# Patient Record
Sex: FEMALE | Race: WHITE | NOT HISPANIC OR LATINO | Employment: UNEMPLOYED | URBAN - METROPOLITAN AREA
[De-identification: names, ages, dates, MRNs, and addresses within clinical notes are randomized per-mention and may not be internally consistent; named-entity substitution may affect disease eponyms.]

---

## 2019-08-19 ENCOUNTER — CLINICAL SUPPORT (OUTPATIENT)
Dept: URGENT CARE | Facility: CLINIC | Age: 34
End: 2019-08-19
Payer: MEDICAID

## 2019-08-19 VITALS
HEART RATE: 94 BPM | WEIGHT: 193 LBS | TEMPERATURE: 98 F | BODY MASS INDEX: 29.25 KG/M2 | DIASTOLIC BLOOD PRESSURE: 76 MMHG | HEIGHT: 68 IN | OXYGEN SATURATION: 97 % | RESPIRATION RATE: 16 BRPM | SYSTOLIC BLOOD PRESSURE: 134 MMHG

## 2019-08-19 DIAGNOSIS — N39.0 URINARY TRACT INFECTION WITHOUT HEMATURIA, SITE UNSPECIFIED: Primary | ICD-10-CM

## 2019-08-19 LAB
B-HCG UR QL: NEGATIVE
BILIRUB UR QL STRIP: NEGATIVE
CTP QC/QA: YES
GLUCOSE UR QL STRIP: NEGATIVE
KETONES UR QL STRIP: NEGATIVE
LEUKOCYTE ESTERASE UR QL STRIP: POSITIVE
PH, POC UA: 8
POC BLOOD, URINE: NEGATIVE
POC NITRATES, URINE: NEGATIVE
PROT UR QL STRIP: NEGATIVE
SP GR UR STRIP: 1 (ref 1–1.03)
UROBILINOGEN UR STRIP-ACNC: NORMAL (ref 0.1–1.1)

## 2019-08-19 PROCEDURE — 81003 POCT URINALYSIS, DIPSTICK, AUTOMATED, W/O SCOPE: ICD-10-PCS | Mod: QW,S$GLB,, | Performed by: NURSE PRACTITIONER

## 2019-08-19 PROCEDURE — 99204 OFFICE O/P NEW MOD 45 MIN: CPT | Mod: 25,S$GLB,, | Performed by: NURSE PRACTITIONER

## 2019-08-19 PROCEDURE — 99204 PR OFFICE/OUTPT VISIT, NEW, LEVL IV, 45-59 MIN: ICD-10-PCS | Mod: 25,S$GLB,, | Performed by: NURSE PRACTITIONER

## 2019-08-19 PROCEDURE — 81025 URINE PREGNANCY TEST: CPT | Mod: S$GLB,,, | Performed by: NURSE PRACTITIONER

## 2019-08-19 PROCEDURE — 81003 URINALYSIS AUTO W/O SCOPE: CPT | Mod: QW,S$GLB,, | Performed by: NURSE PRACTITIONER

## 2019-08-19 PROCEDURE — 81025 POCT URINE PREGNANCY: ICD-10-PCS | Mod: S$GLB,,, | Performed by: NURSE PRACTITIONER

## 2019-08-19 RX ORDER — PHENAZOPYRIDINE HYDROCHLORIDE 200 MG/1
200 TABLET, FILM COATED ORAL 3 TIMES DAILY PRN
Qty: 12 TABLET | Refills: 0 | Status: SHIPPED | OUTPATIENT
Start: 2019-08-19 | End: 2019-08-23

## 2019-08-19 RX ORDER — CEPHALEXIN 500 MG/1
500 CAPSULE ORAL 4 TIMES DAILY
Qty: 28 CAPSULE | Refills: 0 | Status: SHIPPED | OUTPATIENT
Start: 2019-08-19 | End: 2019-08-26

## 2019-08-19 NOTE — PROGRESS NOTES
"Subjective:       Patient ID: Soraya Gee is a 34 y.o. female.    Vitals:  height is 5' 8" (1.727 m) and weight is 87.5 kg (193 lb). Her temperature is 98.1 °F (36.7 °C). Her blood pressure is 134/76 and her pulse is 94. Her respiration is 16 and oxygen saturation is 97%.     Chief Complaint: Dysuria    Patient complains of urinary frequency, urgency and burning that began 1 week ago. Denies any new partners, vaginal discharge, vaginal bleeding, fever, hematuria or flank pain.     Dysuria    This is a new problem. Episode onset: 5 days. The problem occurs every urination. The problem has been gradually worsening. The quality of the pain is described as aching. She is sexually active. There is no history of pyelonephritis. Associated symptoms include frequency and urgency. Pertinent negatives include no chills, nausea, vomiting or rash. Associated symptoms comments: pressure  . Treatments tried: AZOs.       Constitution: Negative for chills, fatigue and fever.   HENT: Negative for congestion and sore throat.    Neck: Negative for painful lymph nodes.   Cardiovascular: Negative for chest pain and leg swelling.   Eyes: Negative for double vision and blurred vision.   Respiratory: Negative for cough and shortness of breath.    Gastrointestinal: Negative for nausea, vomiting and diarrhea.   Genitourinary: Positive for dysuria, frequency and urgency. Negative for history of kidney stones.   Musculoskeletal: Negative for joint pain, joint swelling, muscle cramps and muscle ache.   Skin: Negative for color change, pale, rash and bruising.   Allergic/Immunologic: Negative for seasonal allergies.   Neurological: Negative for dizziness, history of vertigo, light-headedness, passing out and headaches.   Hematologic/Lymphatic: Negative for swollen lymph nodes.   Psychiatric/Behavioral: Negative for nervous/anxious, sleep disturbance and depression. The patient is not nervous/anxious.        Objective:      Physical Exam "   Constitutional: She is oriented to person, place, and time. She appears well-developed and well-nourished. She is cooperative.  Non-toxic appearance. She does not appear ill. No distress.   HENT:   Head: Normocephalic and atraumatic.   Right Ear: Hearing, tympanic membrane, external ear and ear canal normal.   Left Ear: Hearing, tympanic membrane, external ear and ear canal normal.   Nose: Nose normal. No mucosal edema, rhinorrhea or nasal deformity. No epistaxis. Right sinus exhibits no maxillary sinus tenderness and no frontal sinus tenderness. Left sinus exhibits no maxillary sinus tenderness and no frontal sinus tenderness.   Mouth/Throat: Uvula is midline, oropharynx is clear and moist and mucous membranes are normal. No trismus in the jaw. Normal dentition. No uvula swelling. No posterior oropharyngeal erythema.   Eyes: Conjunctivae and lids are normal. Right eye exhibits no discharge. Left eye exhibits no discharge. No scleral icterus.   Sclera clear bilat   Neck: Trachea normal, normal range of motion, full passive range of motion without pain and phonation normal. Neck supple.   Cardiovascular: Normal rate, regular rhythm, normal heart sounds, intact distal pulses and normal pulses.   Pulmonary/Chest: Effort normal and breath sounds normal. No respiratory distress.   Abdominal: Soft. Normal appearance and bowel sounds are normal. She exhibits no distension, no pulsatile midline mass and no mass. There is no tenderness.   Musculoskeletal: Normal range of motion. She exhibits no edema or deformity.   Neurological: She is alert and oriented to person, place, and time. She exhibits normal muscle tone. Coordination normal. GCS eye subscore is 4. GCS verbal subscore is 5. GCS motor subscore is 6.   Skin: Skin is warm, dry and intact. No rash noted. She is not diaphoretic. No pallor.   Psychiatric: She has a normal mood and affect. Her speech is normal and behavior is normal. Judgment and thought content normal.  Cognition and memory are normal.   Nursing note and vitals reviewed.      Assessment:       1. Urinary tract infection without hematuria, site unspecified        Plan:       The patient's symptoms are consistent with a urinary tract infection.  The patient does not appear to have pyelonephritis, urinary retention, ureterolithiasis, or urosepsis.  The patient will be treated with keflex pending a urine culture. Instructed patient to follow up with PCP or urology if symptoms continue. Instructions given on when to go to the ED.     Urinary tract infection without hematuria, site unspecified  -     POCT Urinalysis, Dipstick, Automated, W/O Scope  -     POCT urine pregnancy  -     Culture, Urine    Other orders  -     cephALEXin (KEFLEX) 500 MG capsule; Take 1 capsule (500 mg total) by mouth 4 (four) times daily. for 7 days  Dispense: 28 capsule; Refill: 0  -     phenazopyridine (PYRIDIUM) 200 MG tablet; Take 1 tablet (200 mg total) by mouth 3 (three) times daily as needed for Pain.  Dispense: 12 tablet; Refill: 0

## 2019-08-19 NOTE — PATIENT INSTRUCTIONS

## 2019-08-26 LAB
BACTERIA UR CULT: ABNORMAL
BACTERIA UR CULT: ABNORMAL
OTHER ANTIBIOTIC SUSC ISLT: ABNORMAL

## 2019-09-04 ENCOUNTER — TELEPHONE (OUTPATIENT)
Dept: URGENT CARE | Facility: CLINIC | Age: 34
End: 2019-09-04

## 2019-09-04 NOTE — TELEPHONE ENCOUNTER
----- Message from Ofelia Dwyer NP sent at 8/27/2019  9:18 AM CDT -----  E. Coli in urine culture- patient was given Keflex which is appropriate treatment. Please contact patient- if she is still having symptoms, will need PCP or urology follow up for additional testing.

## 2020-02-29 ENCOUNTER — HOSPITAL ENCOUNTER (INPATIENT)
Facility: HOSPITAL | Age: 35
LOS: 18 days | Discharge: HOME | DRG: 871 | End: 2020-03-18
Attending: EMERGENCY MEDICINE | Admitting: INTERNAL MEDICINE
Payer: MEDICAID

## 2020-02-29 DIAGNOSIS — R78.81 MRSA BACTEREMIA: ICD-10-CM

## 2020-02-29 DIAGNOSIS — I50.9 CONGESTIVE HEART FAILURE, UNSPECIFIED HF CHRONICITY, UNSPECIFIED HEART FAILURE TYPE: ICD-10-CM

## 2020-02-29 DIAGNOSIS — B95.62 MRSA BACTEREMIA: ICD-10-CM

## 2020-02-29 DIAGNOSIS — I07.9 ENDOCARDITIS OF TRICUSPID VALVE: ICD-10-CM

## 2020-02-29 DIAGNOSIS — I38 ENDOCARDITIS, UNSPECIFIED CHRONICITY, UNSPECIFIED ENDOCARDITIS TYPE: ICD-10-CM

## 2020-02-29 DIAGNOSIS — F11.10 HEROIN ABUSE: ICD-10-CM

## 2020-02-29 DIAGNOSIS — I38 ENDOCARDITIS: ICD-10-CM

## 2020-02-29 DIAGNOSIS — R01.1 MURMUR, HEART: ICD-10-CM

## 2020-02-29 DIAGNOSIS — I33.0 ACUTE BACTERIAL ENDOCARDITIS: Primary | ICD-10-CM

## 2020-02-29 PROBLEM — N17.9 AKI (ACUTE KIDNEY INJURY): Status: ACTIVE | Noted: 2020-02-29

## 2020-02-29 PROBLEM — F19.90 IVDU (INTRAVENOUS DRUG USER): Status: ACTIVE | Noted: 2020-02-29

## 2020-02-29 PROBLEM — I33.9 ACUTE ENDOCARDITIS: Status: ACTIVE | Noted: 2020-02-29

## 2020-02-29 PROBLEM — B19.20 HEPATITIS C: Status: ACTIVE | Noted: 2020-02-29

## 2020-02-29 LAB
ALBUMIN SERPL BCP-MCNC: 2.9 G/DL (ref 3.5–5.2)
ALBUMIN SERPL BCP-MCNC: 2.9 G/DL (ref 3.5–5.2)
ALLENS TEST: ABNORMAL
ALLENS TEST: ABNORMAL
ALP SERPL-CCNC: 164 U/L (ref 55–135)
ALP SERPL-CCNC: 164 U/L (ref 55–135)
ALT SERPL W/O P-5'-P-CCNC: 65 U/L (ref 10–44)
ALT SERPL W/O P-5'-P-CCNC: 65 U/L (ref 10–44)
AMPHET+METHAMPHET UR QL: NEGATIVE
AMYLASE SERPL-CCNC: 25 U/L (ref 20–110)
ANION GAP SERPL CALC-SCNC: 15 MMOL/L (ref 8–16)
ANION GAP SERPL CALC-SCNC: 15 MMOL/L (ref 8–16)
APTT PPP: 30.5 SEC (ref 23.6–33.3)
AST SERPL-CCNC: 72 U/L (ref 10–40)
AST SERPL-CCNC: 72 U/L (ref 10–40)
B-HCG UR QL: NEGATIVE
BACTERIA #/AREA URNS HPF: NEGATIVE /HPF
BARBITURATES UR QL SCN>200 NG/ML: NEGATIVE
BASOPHILS # BLD AUTO: 0.07 K/UL (ref 0–0.2)
BASOPHILS NFR BLD: 0.6 % (ref 0–1.9)
BENZODIAZ UR QL SCN>200 NG/ML: NORMAL
BILIRUB SERPL-MCNC: 6.2 MG/DL (ref 0.1–1)
BILIRUB SERPL-MCNC: 6.2 MG/DL (ref 0.1–1)
BILIRUB UR QL STRIP: ABNORMAL
BNP SERPL-MCNC: 87 PG/ML (ref 0–99)
BUN SERPL-MCNC: 52 MG/DL (ref 6–20)
BUN SERPL-MCNC: 52 MG/DL (ref 6–20)
BZE UR QL SCN: NEGATIVE
CALCIUM SERPL-MCNC: 8.5 MG/DL (ref 8.7–10.5)
CALCIUM SERPL-MCNC: 8.5 MG/DL (ref 8.7–10.5)
CANNABINOIDS UR QL SCN: NEGATIVE
CHLORIDE SERPL-SCNC: 91 MMOL/L (ref 95–110)
CHLORIDE SERPL-SCNC: 91 MMOL/L (ref 95–110)
CK MB SERPL-MCNC: 1.5 NG/ML (ref 0.1–6.5)
CK SERPL-CCNC: 19 U/L (ref 20–180)
CLARITY UR: ABNORMAL
CO2 SERPL-SCNC: 26 MMOL/L (ref 23–29)
CO2 SERPL-SCNC: 26 MMOL/L (ref 23–29)
COLOR UR: YELLOW
CREAT SERPL-MCNC: 1.8 MG/DL (ref 0.5–1.4)
CREAT SERPL-MCNC: 1.8 MG/DL (ref 0.5–1.4)
CREAT UR-MCNC: 71 MG/DL (ref 15–325)
CRP SERPL-MCNC: 24.33 MG/DL (ref 0–0.75)
CTP QC/QA: YES
CTP QC/QA: YES
DELSYS: ABNORMAL
DELSYS: ABNORMAL
DIFFERENTIAL METHOD: ABNORMAL
EOSINOPHIL # BLD AUTO: 0.1 K/UL (ref 0–0.5)
EOSINOPHIL NFR BLD: 1.2 % (ref 0–8)
EP: 8
ERYTHROCYTE [DISTWIDTH] IN BLOOD BY AUTOMATED COUNT: 13.8 % (ref 11.5–14.5)
ERYTHROCYTE [SEDIMENTATION RATE] IN BLOOD BY WESTERGREN METHOD: 20 MM/H
ERYTHROCYTE [SEDIMENTATION RATE] IN BLOOD BY WESTERGREN METHOD: 44 MM/HR (ref 0–20)
EST. GFR  (AFRICAN AMERICAN): 42 ML/MIN/1.73 M^2
EST. GFR  (AFRICAN AMERICAN): 42 ML/MIN/1.73 M^2
EST. GFR  (NON AFRICAN AMERICAN): 36.5 ML/MIN/1.73 M^2
EST. GFR  (NON AFRICAN AMERICAN): 36.5 ML/MIN/1.73 M^2
ETHANOL SERPL-MCNC: <5 MG/DL
FIO2: 35
FLOW: 2
GLUCOSE SERPL-MCNC: 88 MG/DL (ref 70–110)
GLUCOSE SERPL-MCNC: 88 MG/DL (ref 70–110)
GLUCOSE SERPL-MCNC: 93 MG/DL (ref 70–110)
GLUCOSE SERPL-MCNC: 99 MG/DL (ref 70–110)
GLUCOSE UR QL STRIP: NEGATIVE
HCO3 UR-SCNC: 27.1 MMOL/L (ref 24–28)
HCO3 UR-SCNC: 30.5 MMOL/L (ref 24–28)
HCT VFR BLD AUTO: 34 % (ref 37–48.5)
HCT VFR BLD CALC: 34 %PCV (ref 36–54)
HCT VFR BLD CALC: 34 %PCV (ref 36–54)
HGB BLD-MCNC: 11.9 G/DL (ref 12–16)
HGB UR QL STRIP: ABNORMAL
HYALINE CASTS #/AREA URNS LPF: 10 /LPF
IMM GRANULOCYTES # BLD AUTO: 0.17 K/UL (ref 0–0.04)
IMM GRANULOCYTES NFR BLD AUTO: 1.5 % (ref 0–0.5)
INR PPP: 1.4
IP: 16
KETONES UR QL STRIP: NEGATIVE
LACTATE SERPL-SCNC: 1.7 MMOL/L (ref 0.5–1.9)
LACTATE SERPL-SCNC: 1.9 MMOL/L (ref 0.5–1.9)
LEUKOCYTE ESTERASE UR QL STRIP: ABNORMAL
LIPASE SERPL-CCNC: 29 U/L (ref 4–60)
LYMPHOCYTES # BLD AUTO: 1 K/UL (ref 1–4.8)
LYMPHOCYTES NFR BLD: 8.4 % (ref 18–48)
MAGNESIUM SERPL-MCNC: 1.7 MG/DL (ref 1.6–2.6)
MCH RBC QN AUTO: 30.8 PG (ref 27–31)
MCHC RBC AUTO-ENTMCNC: 35 G/DL (ref 32–36)
MCV RBC AUTO: 88 FL (ref 82–98)
MICROSCOPIC COMMENT: ABNORMAL
MIN VOL: 12.6
MODE: ABNORMAL
MODE: ABNORMAL
MONOCYTES # BLD AUTO: 0.6 K/UL (ref 0.3–1)
MONOCYTES NFR BLD: 5.6 % (ref 4–15)
NEUTROPHILS # BLD AUTO: 9.4 K/UL (ref 1.8–7.7)
NEUTROPHILS NFR BLD: 82.7 % (ref 38–73)
NITRITE UR QL STRIP: NEGATIVE
NRBC BLD-RTO: 0 /100 WBC
OPIATES UR QL SCN: NORMAL
PCO2 BLDA: 30.5 MMHG (ref 35–45)
PCO2 BLDA: 42.2 MMHG (ref 35–45)
PCP UR QL SCN>25 NG/ML: NEGATIVE
PH SMN: 7.47 [PH] (ref 7.35–7.45)
PH SMN: 7.55 [PH] (ref 7.35–7.45)
PH UR STRIP: 6 [PH] (ref 5–8)
PHOSPHATE SERPL-MCNC: 3.6 MG/DL (ref 2.7–4.5)
PLATELET # BLD AUTO: 87 K/UL (ref 150–350)
PMV BLD AUTO: 11.4 FL (ref 9.2–12.9)
PO2 BLDA: 56 MMHG (ref 80–100)
PO2 BLDA: 91 MMHG (ref 80–100)
POC BE: 5 MMOL/L
POC BE: 7 MMOL/L
POC IONIZED CALCIUM: 1.08 MMOL/L (ref 1.06–1.42)
POC IONIZED CALCIUM: 1.08 MMOL/L (ref 1.06–1.42)
POC MOLECULAR INFLUENZA A AGN: NEGATIVE
POC MOLECULAR INFLUENZA B AGN: NEGATIVE
POC SATURATED O2: 93 % (ref 95–100)
POC SATURATED O2: 98 % (ref 95–100)
POC TCO2: 28 MMOL/L (ref 23–27)
POC TCO2: 32 MMOL/L (ref 23–27)
POTASSIUM BLD-SCNC: 2.4 MMOL/L (ref 3.5–5.1)
POTASSIUM BLD-SCNC: 2.6 MMOL/L (ref 3.5–5.1)
POTASSIUM SERPL-SCNC: 2.5 MMOL/L (ref 3.5–5.1)
POTASSIUM SERPL-SCNC: 2.5 MMOL/L (ref 3.5–5.1)
PROCALCITONIN SERPL IA-MCNC: 5.95 NG/ML (ref 0–0.5)
PROT SERPL-MCNC: 6.9 G/DL (ref 6–8.4)
PROT SERPL-MCNC: 6.9 G/DL (ref 6–8.4)
PROT UR QL STRIP: ABNORMAL
PROTHROMBIN TIME: 16.4 SEC (ref 10.6–14.8)
RBC # BLD AUTO: 3.86 M/UL (ref 4–5.4)
RBC #/AREA URNS HPF: 18 /HPF (ref 0–4)
SAMPLE: ABNORMAL
SAMPLE: ABNORMAL
SITE: ABNORMAL
SITE: ABNORMAL
SODIUM BLD-SCNC: 131 MMOL/L (ref 136–145)
SODIUM BLD-SCNC: 134 MMOL/L (ref 136–145)
SODIUM SERPL-SCNC: 132 MMOL/L (ref 136–145)
SODIUM SERPL-SCNC: 132 MMOL/L (ref 136–145)
SP GR UR STRIP: 1.01 (ref 1–1.03)
SP02: 100
SPONT RATE: 12
SQUAMOUS #/AREA URNS HPF: 1 /HPF
TOXICOLOGY INFORMATION: NORMAL
TROPONIN I SERPL DL<=0.01 NG/ML-MCNC: 0.07 NG/ML
TROPONIN I SERPL DL<=0.01 NG/ML-MCNC: 0.16 NG/ML
URN SPEC COLLECT METH UR: ABNORMAL
UROBILINOGEN UR STRIP-ACNC: ABNORMAL EU/DL
WBC # BLD AUTO: 11.34 K/UL (ref 3.9–12.7)
WBC #/AREA URNS HPF: 14 /HPF (ref 0–5)

## 2020-02-29 PROCEDURE — 86140 C-REACTIVE PROTEIN: CPT

## 2020-02-29 PROCEDURE — 94761 N-INVAS EAR/PLS OXIMETRY MLT: CPT

## 2020-02-29 PROCEDURE — 84295 ASSAY OF SERUM SODIUM: CPT

## 2020-02-29 PROCEDURE — 80053 COMPREHEN METABOLIC PANEL: CPT

## 2020-02-29 PROCEDURE — 85025 COMPLETE CBC W/AUTO DIFF WBC: CPT

## 2020-02-29 PROCEDURE — 83735 ASSAY OF MAGNESIUM: CPT

## 2020-02-29 PROCEDURE — 99900035 HC TECH TIME PER 15 MIN (STAT)

## 2020-02-29 PROCEDURE — 82330 ASSAY OF CALCIUM: CPT

## 2020-02-29 PROCEDURE — 36415 COLL VENOUS BLD VENIPUNCTURE: CPT

## 2020-02-29 PROCEDURE — 63600175 PHARM REV CODE 636 W HCPCS: Performed by: INTERNAL MEDICINE

## 2020-02-29 PROCEDURE — 83605 ASSAY OF LACTIC ACID: CPT

## 2020-02-29 PROCEDURE — 99291 CRITICAL CARE FIRST HOUR: CPT

## 2020-02-29 PROCEDURE — 96368 THER/DIAG CONCURRENT INF: CPT

## 2020-02-29 PROCEDURE — 63600175 PHARM REV CODE 636 W HCPCS: Performed by: EMERGENCY MEDICINE

## 2020-02-29 PROCEDURE — 96365 THER/PROPH/DIAG IV INF INIT: CPT

## 2020-02-29 PROCEDURE — 82803 BLOOD GASES ANY COMBINATION: CPT

## 2020-02-29 PROCEDURE — 85610 PROTHROMBIN TIME: CPT

## 2020-02-29 PROCEDURE — 27000221 HC OXYGEN, UP TO 24 HOURS

## 2020-02-29 PROCEDURE — 51702 INSERT TEMP BLADDER CATH: CPT

## 2020-02-29 PROCEDURE — 25000003 PHARM REV CODE 250: Performed by: INTERNAL MEDICINE

## 2020-02-29 PROCEDURE — 80320 DRUG SCREEN QUANTALCOHOLS: CPT

## 2020-02-29 PROCEDURE — 20000000 HC ICU ROOM

## 2020-02-29 PROCEDURE — 83605 ASSAY OF LACTIC ACID: CPT | Mod: 91

## 2020-02-29 PROCEDURE — 84484 ASSAY OF TROPONIN QUANT: CPT | Mod: 91

## 2020-02-29 PROCEDURE — 81001 URINALYSIS AUTO W/SCOPE: CPT

## 2020-02-29 PROCEDURE — 82550 ASSAY OF CK (CPK): CPT

## 2020-02-29 PROCEDURE — 27100171 HC OXYGEN HIGH FLOW UP TO 24 HOURS

## 2020-02-29 PROCEDURE — 81025 URINE PREGNANCY TEST: CPT | Performed by: EMERGENCY MEDICINE

## 2020-02-29 PROCEDURE — 82553 CREATINE MB FRACTION: CPT

## 2020-02-29 PROCEDURE — 93005 ELECTROCARDIOGRAM TRACING: CPT

## 2020-02-29 PROCEDURE — 96375 TX/PRO/DX INJ NEW DRUG ADDON: CPT

## 2020-02-29 PROCEDURE — 83880 ASSAY OF NATRIURETIC PEPTIDE: CPT

## 2020-02-29 PROCEDURE — 83690 ASSAY OF LIPASE: CPT

## 2020-02-29 PROCEDURE — 85651 RBC SED RATE NONAUTOMATED: CPT

## 2020-02-29 PROCEDURE — 87040 BLOOD CULTURE FOR BACTERIA: CPT | Mod: 59

## 2020-02-29 PROCEDURE — 94660 CPAP INITIATION&MGMT: CPT

## 2020-02-29 PROCEDURE — 87186 SC STD MICRODIL/AGAR DIL: CPT

## 2020-02-29 PROCEDURE — 36600 WITHDRAWAL OF ARTERIAL BLOOD: CPT

## 2020-02-29 PROCEDURE — 84132 ASSAY OF SERUM POTASSIUM: CPT

## 2020-02-29 PROCEDURE — 82150 ASSAY OF AMYLASE: CPT

## 2020-02-29 PROCEDURE — 25000003 PHARM REV CODE 250: Performed by: EMERGENCY MEDICINE

## 2020-02-29 PROCEDURE — 85014 HEMATOCRIT: CPT

## 2020-02-29 PROCEDURE — 84484 ASSAY OF TROPONIN QUANT: CPT

## 2020-02-29 PROCEDURE — 84145 PROCALCITONIN (PCT): CPT

## 2020-02-29 PROCEDURE — 87077 CULTURE AEROBIC IDENTIFY: CPT

## 2020-02-29 PROCEDURE — 84100 ASSAY OF PHOSPHORUS: CPT

## 2020-02-29 PROCEDURE — 87147 CULTURE TYPE IMMUNOLOGIC: CPT

## 2020-02-29 PROCEDURE — 87086 URINE CULTURE/COLONY COUNT: CPT

## 2020-02-29 PROCEDURE — 12000002 HC ACUTE/MED SURGE SEMI-PRIVATE ROOM

## 2020-02-29 PROCEDURE — 80307 DRUG TEST PRSMV CHEM ANLYZR: CPT

## 2020-02-29 PROCEDURE — 85730 THROMBOPLASTIN TIME PARTIAL: CPT

## 2020-02-29 RX ORDER — POTASSIUM CHLORIDE 7.45 MG/ML
40 INJECTION INTRAVENOUS
Status: DISCONTINUED | OUTPATIENT
Start: 2020-02-29 | End: 2020-03-18 | Stop reason: HOSPADM

## 2020-02-29 RX ORDER — POTASSIUM CHLORIDE 20 MEQ/1
20 TABLET, EXTENDED RELEASE ORAL
Status: DISCONTINUED | OUTPATIENT
Start: 2020-02-29 | End: 2020-03-18 | Stop reason: HOSPADM

## 2020-02-29 RX ORDER — VANCOMYCIN HCL IN 5 % DEXTROSE 1G/250ML
1000 PLASTIC BAG, INJECTION (ML) INTRAVENOUS
Status: COMPLETED | OUTPATIENT
Start: 2020-02-29 | End: 2020-02-29

## 2020-02-29 RX ORDER — POTASSIUM CHLORIDE 20 MEQ/1
40 TABLET, EXTENDED RELEASE ORAL
Status: DISCONTINUED | OUTPATIENT
Start: 2020-02-29 | End: 2020-03-18 | Stop reason: HOSPADM

## 2020-02-29 RX ORDER — FUROSEMIDE 10 MG/ML
20 INJECTION INTRAMUSCULAR; INTRAVENOUS
Status: COMPLETED | OUTPATIENT
Start: 2020-02-29 | End: 2020-02-29

## 2020-02-29 RX ORDER — POTASSIUM CHLORIDE 7.45 MG/ML
20 INJECTION INTRAVENOUS
Status: DISCONTINUED | OUTPATIENT
Start: 2020-02-29 | End: 2020-03-18 | Stop reason: HOSPADM

## 2020-02-29 RX ORDER — LORAZEPAM 2 MG/ML
0.5 INJECTION INTRAMUSCULAR EVERY 4 HOURS PRN
Status: DISCONTINUED | OUTPATIENT
Start: 2020-02-29 | End: 2020-03-01

## 2020-02-29 RX ORDER — ACETAMINOPHEN 325 MG/1
650 TABLET ORAL EVERY 4 HOURS PRN
Status: DISCONTINUED | OUTPATIENT
Start: 2020-02-29 | End: 2020-03-18 | Stop reason: HOSPADM

## 2020-02-29 RX ORDER — HYDROMORPHONE HYDROCHLORIDE 1 MG/ML
1 INJECTION, SOLUTION INTRAMUSCULAR; INTRAVENOUS; SUBCUTANEOUS ONCE
Status: COMPLETED | OUTPATIENT
Start: 2020-02-29 | End: 2020-02-29

## 2020-02-29 RX ORDER — MAGNESIUM SULFATE 1 G/100ML
1 INJECTION INTRAVENOUS
Status: DISCONTINUED | OUTPATIENT
Start: 2020-02-29 | End: 2020-03-18 | Stop reason: HOSPADM

## 2020-02-29 RX ORDER — POTASSIUM CHLORIDE 7.45 MG/ML
10 INJECTION INTRAVENOUS ONCE
Status: COMPLETED | OUTPATIENT
Start: 2020-02-29 | End: 2020-02-29

## 2020-02-29 RX ORDER — SODIUM CHLORIDE, SODIUM LACTATE, POTASSIUM CHLORIDE, CALCIUM CHLORIDE 600; 310; 30; 20 MG/100ML; MG/100ML; MG/100ML; MG/100ML
1000 INJECTION, SOLUTION INTRAVENOUS
Status: COMPLETED | OUTPATIENT
Start: 2020-02-29 | End: 2020-02-29

## 2020-02-29 RX ORDER — MAGNESIUM SULFATE HEPTAHYDRATE 40 MG/ML
2 INJECTION, SOLUTION INTRAVENOUS
Status: DISCONTINUED | OUTPATIENT
Start: 2020-02-29 | End: 2020-03-18 | Stop reason: HOSPADM

## 2020-02-29 RX ORDER — FUROSEMIDE 10 MG/ML
20 INJECTION INTRAMUSCULAR; INTRAVENOUS ONCE
Status: COMPLETED | OUTPATIENT
Start: 2020-02-29 | End: 2020-02-29

## 2020-02-29 RX ORDER — HYDROMORPHONE HYDROCHLORIDE 1 MG/ML
1 INJECTION, SOLUTION INTRAMUSCULAR; INTRAVENOUS; SUBCUTANEOUS
Status: DISCONTINUED | OUTPATIENT
Start: 2020-02-29 | End: 2020-02-29

## 2020-02-29 RX ORDER — ONDANSETRON 2 MG/ML
4 INJECTION INTRAMUSCULAR; INTRAVENOUS EVERY 6 HOURS PRN
Status: DISCONTINUED | OUTPATIENT
Start: 2020-02-29 | End: 2020-03-18 | Stop reason: HOSPADM

## 2020-02-29 RX ORDER — METHADONE HYDROCHLORIDE 10 MG/1
20 TABLET ORAL DAILY
Status: DISCONTINUED | OUTPATIENT
Start: 2020-02-29 | End: 2020-02-29

## 2020-02-29 RX ORDER — LORAZEPAM 2 MG/ML
0.5 INJECTION INTRAMUSCULAR ONCE
Status: COMPLETED | OUTPATIENT
Start: 2020-02-29 | End: 2020-02-29

## 2020-02-29 RX ORDER — ENOXAPARIN SODIUM 100 MG/ML
40 INJECTION SUBCUTANEOUS EVERY 24 HOURS
Status: DISCONTINUED | OUTPATIENT
Start: 2020-02-29 | End: 2020-03-18 | Stop reason: HOSPADM

## 2020-02-29 RX ORDER — FLUCONAZOLE 2 MG/ML
400 INJECTION, SOLUTION INTRAVENOUS
Status: DISCONTINUED | OUTPATIENT
Start: 2020-02-29 | End: 2020-03-01

## 2020-02-29 RX ORDER — LANOLIN ALCOHOL/MO/W.PET/CERES
800 CREAM (GRAM) TOPICAL
Status: DISCONTINUED | OUTPATIENT
Start: 2020-02-29 | End: 2020-03-18 | Stop reason: HOSPADM

## 2020-02-29 RX ORDER — MAGNESIUM SULFATE HEPTAHYDRATE 40 MG/ML
4 INJECTION, SOLUTION INTRAVENOUS
Status: DISCONTINUED | OUTPATIENT
Start: 2020-02-29 | End: 2020-03-18 | Stop reason: HOSPADM

## 2020-02-29 RX ORDER — HYDROMORPHONE HYDROCHLORIDE 1 MG/ML
1 INJECTION, SOLUTION INTRAMUSCULAR; INTRAVENOUS; SUBCUTANEOUS
Status: DISCONTINUED | OUTPATIENT
Start: 2020-02-29 | End: 2020-03-01

## 2020-02-29 RX ADMIN — ENOXAPARIN SODIUM 40 MG: 100 INJECTION SUBCUTANEOUS at 05:02

## 2020-02-29 RX ADMIN — VANCOMYCIN HYDROCHLORIDE 1000 MG: 1 INJECTION, POWDER, LYOPHILIZED, FOR SOLUTION INTRAVENOUS at 05:02

## 2020-02-29 RX ADMIN — SODIUM CHLORIDE, SODIUM LACTATE, POTASSIUM CHLORIDE, AND CALCIUM CHLORIDE 1000 ML: .6; .31; .03; .02 INJECTION, SOLUTION INTRAVENOUS at 03:02

## 2020-02-29 RX ADMIN — POTASSIUM BICARBONATE 50 MEQ: 25 TABLET, EFFERVESCENT ORAL at 04:02

## 2020-02-29 RX ADMIN — FUROSEMIDE 20 MG: 10 INJECTION, SOLUTION INTRAMUSCULAR; INTRAVENOUS at 08:02

## 2020-02-29 RX ADMIN — VANCOMYCIN HYDROCHLORIDE 500 MG: 500 INJECTION, POWDER, LYOPHILIZED, FOR SOLUTION INTRAVENOUS at 07:02

## 2020-02-29 RX ADMIN — FLUCONAZOLE 400 MG: 2 INJECTION INTRAVENOUS at 10:02

## 2020-02-29 RX ADMIN — LORAZEPAM 0.5 MG: 2 INJECTION INTRAMUSCULAR; INTRAVENOUS at 08:02

## 2020-02-29 RX ADMIN — FUROSEMIDE 20 MG: 10 INJECTION, SOLUTION INTRAMUSCULAR; INTRAVENOUS at 04:02

## 2020-02-29 RX ADMIN — METHADONE HYDROCHLORIDE 20 MG: 10 TABLET ORAL at 07:02

## 2020-02-29 RX ADMIN — HYDROMORPHONE HYDROCHLORIDE 1 MG: 1 INJECTION, SOLUTION INTRAMUSCULAR; INTRAVENOUS; SUBCUTANEOUS at 08:02

## 2020-02-29 RX ADMIN — CEFTRIAXONE 2 G: 2 INJECTION, SOLUTION INTRAVENOUS at 04:02

## 2020-02-29 RX ADMIN — POTASSIUM CHLORIDE 10 MEQ: 10 INJECTION, SOLUTION INTRAVENOUS at 04:02

## 2020-02-29 NOTE — ASSESSMENT & PLAN NOTE
History of hepatitis C  Right now bilirubin level is on the higher range along with slight elevation of hepatic panels  Will do an ultrasound of the abdomen

## 2020-02-29 NOTE — ED NOTES
Patient states has small bag of heroin in her hair, found in bed informed need to give to officer, small bag with solid crystal substance noticed, baggie given to detail officer ERIC Butler

## 2020-02-29 NOTE — SUBJECTIVE & OBJECTIVE
Past Medical History:   Diagnosis Date    Hepatitis C        Past Surgical History:   Procedure Laterality Date     SECTION         Review of patient's allergies indicates:  No Known Allergies    No current facility-administered medications on file prior to encounter.      No current outpatient medications on file prior to encounter.     Family History     Problem Relation (Age of Onset)    Hypertension Father        Tobacco Use    Smoking status: Current Every Day Smoker     Packs/day: 0.50     Types: Cigarettes    Smokeless tobacco: Never Used   Substance and Sexual Activity    Alcohol use: Yes    Drug use: Not Currently    Sexual activity: Yes     Partners: Male     Review of Systems   Constitutional: Positive for fatigue. Negative for activity change and appetite change.   HENT: Negative for congestion and dental problem.    Eyes: Negative for discharge and itching.   Respiratory: Positive for shortness of breath.    Cardiovascular: Negative for chest pain.   Gastrointestinal: Negative for abdominal distention and abdominal pain.   Endocrine: Negative for cold intolerance.   Genitourinary: Negative for difficulty urinating and dysuria.   Musculoskeletal: Negative for arthralgias and back pain.   Skin: Negative for color change.   Neurological: Negative for dizziness and facial asymmetry.   Hematological: Negative for adenopathy.   Psychiatric/Behavioral: Negative for agitation and behavioral problems.     Objective:     Vital Signs (Most Recent):  Temp: 99.1 °F (37.3 °C)(patient given ice chips ok per md) (20 1700)  Pulse: (!) 117 (20 1733)  Resp: (!) 41 (20 1733)  BP: (!) 101/54 (20 1733)  SpO2: (!) 93 % (20 1733) Vital Signs (24h Range):  Temp:  [98.9 °F (37.2 °C)-99.1 °F (37.3 °C)] 99.1 °F (37.3 °C)  Pulse:  [112-127] 117  Resp:  [22-58] 41  SpO2:  [93 %-100 %] 93 %  BP: ()/(53-61) 101/54     Weight: 78.9 kg (174 lb)  Body mass index is 26.46  kg/m².    Physical Exam   Constitutional: She is oriented to person, place, and time. She appears well-developed. No distress.   HENT:   Right Ear: External ear normal.   Left Ear: External ear normal.   Eyes: Pupils are equal, round, and reactive to light. EOM are normal.   Neck: Neck supple.   Cardiovascular: Normal rate.   Tachycardic  Systolic murmur   Pulmonary/Chest: Effort normal and breath sounds normal.   Minimal bibasilar crackles on lung bases   Abdominal: Soft. Bowel sounds are normal.   Musculoskeletal: Normal range of motion. She exhibits edema.   Neurological: She is alert and oriented to person, place, and time.   Skin: Skin is warm.   Needle  tracks   Psychiatric: She has a normal mood and affect.   Nursing note and vitals reviewed.        CRANIAL NERVES     CN III, IV, VI   Pupils are equal, round, and reactive to light.  Extraocular motions are normal.        Significant Labs:   CBC:   Recent Labs   Lab 02/29/20  1515 02/29/20  1528   WBC 11.34  --    HGB 11.9*  --    HCT 34.0* 34*   PLT 87*  --      CMP:   Recent Labs   Lab 02/29/20  1515   *  132*   K 2.5*  2.5*   CL 91*  91*   CO2 26  26   GLU 88  88   BUN 52*  52*   CREATININE 1.8*  1.8*   CALCIUM 8.5*  8.5*   PROT 6.9  6.9   ALBUMIN 2.9*  2.9*   BILITOT 6.2*  6.2*   ALKPHOS 164*  164*   AST 72*  72*   ALT 65*  65*   ANIONGAP 15  15   EGFRNONAA 36.5*  36.5*       Significant Imaging: I have reviewed all pertinent imaging results/findings within the past 24 hours.

## 2020-02-29 NOTE — ED PROVIDER NOTES
Encounter Date: 2020       History     Chief Complaint   Patient presents with    drug abuse     34-year-old female with history of heroin abuse, IVDA, hepatitis-C.  Patient states has been shooting up over the last 10 years.  She presents to the emergency department with complaint of shortness of breath, cough, subjective fevers, generalized malaise over the last 24-48 hours.  Patient states was doing drugs and feels at this time that she may have cotton fever from injecting.  Patient denies abdominal pain, vomiting, no rash, no other constitutional symptoms.        Review of patient's allergies indicates:  No Known Allergies  Past Medical History:   Diagnosis Date    Hepatitis C      Past Surgical History:   Procedure Laterality Date     SECTION       No family history on file.  Social History     Tobacco Use    Smoking status: Current Every Day Smoker     Packs/day: 0.50     Types: Cigarettes    Smokeless tobacco: Never Used   Substance Use Topics    Alcohol use: Yes    Drug use: Not Currently     Review of Systems   Constitutional: Positive for fatigue and fever.   HENT: Negative for sore throat.    Respiratory: Positive for shortness of breath.    Cardiovascular: Positive for palpitations. Negative for chest pain.   Gastrointestinal: Positive for nausea. Negative for vomiting.   Genitourinary: Negative for dysuria.   Musculoskeletal: Negative for back pain.   Skin: Negative for rash.   Neurological: Negative for weakness, light-headedness, numbness and headaches.   Hematological: Does not bruise/bleed easily.   Psychiatric/Behavioral: The patient is nervous/anxious.        Physical Exam     Initial Vitals [20 1447]   BP Pulse Resp Temp SpO2   113/61 (!) 114 (!) 22 98.9 °F (37.2 °C) 95 %      MAP       --         Physical Exam    Nursing note and vitals reviewed.  Constitutional: She appears well-developed and well-nourished.   Tachypneic upon arrival with mild conversational dyspnea    HENT:   Head: Normocephalic and atraumatic.   Nose: Nose normal.   Mouth/Throat: Oropharynx is clear and moist.   Eyes: Conjunctivae and EOM are normal. Pupils are equal, round, and reactive to light.   Neck: Normal range of motion. Neck supple. No thyromegaly present. No tracheal deviation present.   Cardiovascular: Normal rate, regular rhythm and intact distal pulses. Exam reveals no gallop and no friction rub.    Murmur heard.  3/6 systolic ejection murmur heard at left sternal border   Pulmonary/Chest: Breath sounds normal. No stridor. No respiratory distress.   Bibasilar crackles noted, coarse breath sounds throughout   Abdominal: Soft. Bowel sounds are normal. She exhibits no mass. There is no rebound and no guarding.   Musculoskeletal: Normal range of motion. She exhibits edema.   2+ pedal edema to lower extremities   Lymphadenopathy:     She has no cervical adenopathy.   Neurological: She is alert and oriented to person, place, and time. She has normal strength and normal reflexes. GCS eye subscore is 4. GCS verbal subscore is 5. GCS motor subscore is 6.   Skin: Skin is warm and dry. Capillary refill takes less than 2 seconds.   Psychiatric: She has a normal mood and affect.         ED Course   Procedures  Labs Reviewed   CBC W/ AUTO DIFFERENTIAL - Abnormal; Notable for the following components:       Result Value    RBC 3.86 (*)     Hemoglobin 11.9 (*)     Hematocrit 34.0 (*)     Platelets 87 (*)     Immature Granulocytes 1.5 (*)     Gran # (ANC) 9.4 (*)     Immature Grans (Abs) 0.17 (*)     Gran% 82.7 (*)     Lymph% 8.4 (*)     All other components within normal limits   COMPREHENSIVE METABOLIC PANEL - Abnormal; Notable for the following components:    Sodium 132 (*)     Potassium 2.5 (*)     Chloride 91 (*)     BUN, Bld 52 (*)     Creatinine 1.8 (*)     Calcium 8.5 (*)     Albumin 2.9 (*)     Total Bilirubin 6.2 (*)     Alkaline Phosphatase 164 (*)     AST 72 (*)     ALT 65 (*)     eGFR if   American 42.0 (*)     eGFR if non  36.5 (*)     All other components within normal limits    Narrative:      Potassium critical result(s) repeated. Called and verbal readback   obtained from Rody Castaneda RN/ED by JB8 02/29/2020 15:53   TROPONIN I - Abnormal; Notable for the following components:    Troponin I 0.069 (*)     All other components within normal limits    Narrative:      Troponin critical result(s) repeated. Called and verbal readback   obtained from Rody Castaneda RN/ED by JB8 02/29/2020 15:57   COMPREHENSIVE METABOLIC PANEL - Abnormal; Notable for the following components:    Sodium 132 (*)     Potassium 2.5 (*)     Chloride 91 (*)     BUN, Bld 52 (*)     Creatinine 1.8 (*)     Calcium 8.5 (*)     Albumin 2.9 (*)     Total Bilirubin 6.2 (*)     Alkaline Phosphatase 164 (*)     AST 72 (*)     ALT 65 (*)     eGFR if  42.0 (*)     eGFR if non  36.5 (*)     All other components within normal limits    Narrative:      Potassium critical result(s) repeated. Called and verbal readback   obtained from Rody Castaneda RN/ED by JB8 02/29/2020 15:53   URINALYSIS, REFLEX TO URINE CULTURE - Abnormal; Notable for the following components:    Appearance, UA Hazy (*)     Protein, UA 1+ (*)     Bilirubin (UA) 1+ (*)     Occult Blood UA Trace (*)     Urobilinogen, UA 2.0-3.0 (*)     Leukocytes, UA 1+ (*)     All other components within normal limits    Narrative:     Preferred Collection Type->Urine, Clean Catch  Specimen Source->Urine   PROTIME-INR - Abnormal; Notable for the following components:    PT 16.4 (*)     All other components within normal limits   PROCALCITONIN - Abnormal; Notable for the following components:    Procalcitonin 5.95 (*)     All other components within normal limits   CK - Abnormal; Notable for the following components:    CPK 19 (*)     All other components within normal limits   URINALYSIS MICROSCOPIC - Abnormal; Notable for the following  components:    RBC, UA 18 (*)     WBC, UA 14 (*)     Hyaline Casts, UA 10 (*)     All other components within normal limits    Narrative:     Preferred Collection Type->Urine, Clean Catch  Specimen Source->Urine   C-REACTIVE PROTEIN - Abnormal; Notable for the following components:    CRP 24.33 (*)     All other components within normal limits   ISTAT PROCEDURE - Abnormal; Notable for the following components:    POC PH 7.555 (*)     POC PCO2 30.5 (*)     POC PO2 56 (*)     POC SATURATED O2 93 (*)     POC Sodium 131 (*)     POC Potassium 2.4 (*)     POC TCO2 28 (*)     POC Hematocrit 34 (*)     All other components within normal limits   CULTURE, BLOOD   CULTURE, BLOOD   CULTURE, BLOOD   CULTURE, URINE   B-TYPE NATRIURETIC PEPTIDE   LACTIC ACID, PLASMA   MAGNESIUM   PHOSPHORUS   APTT   DRUG SCREEN PANEL, URINE EMERGENCY    Narrative:     Preferred Collection Type->Urine, Clean Catch  Specimen Source->Urine   ALCOHOL,MEDICAL (ETHANOL)   CK-MB   C-REACTIVE PROTEIN   SEDIMENTATION RATE   SEDIMENTATION RATE   TROPONIN I   LACTIC ACID, PLASMA   POCT INFLUENZA A/B MOLECULAR        ECG Results          EKG 12-lead (In process)  Result time 02/29/20 15:06:19    In process by Interface, Lab In [facility] (02/29/20 15:06:19)                 Narrative:    Test Reason : R07.9,    Vent. Rate : 117 BPM     Atrial Rate : 117 BPM     P-R Int : 202 ms          QRS Dur : 096 ms      QT Int : 274 ms       P-R-T Axes : 062 052 056 degrees     QTc Int : 382 ms    Sinus tachycardia  Possible Left atrial enlargement  Nonspecific ST and T wave abnormality  Abnormal ECG  No previous ECGs available    Referred By:             Confirmed By:                             Imaging Results          X-Ray Chest AP Portable (Final result)  Result time 02/29/20 15:28:29    Final result by Daryl Kyle MD (02/29/20 15:28:29)                 Impression:      Mild central hilar bronchovascular crowding, possibly secondary to low lung volumes and  atelectasis or very mild edema, or atypical infection/bronchitis as above      Electronically signed by: Daryl Kyle MD  Date:    02/29/2020  Time:    15:28             Narrative:    CLINICAL HISTORY:  ([id number])33 y/o  (1985) F    trauma;    TECHNIQUE:  (A#[id number], exam time 2/29/2020 15:24)    XR CHEST AP PORTABLE [id number]    COMPARISON:  None available.    FINDINGS:  Mildly increased central hilar interstitial opacities are seen bilaterally suggestive of either mild edema  atypical infection/pneumonia or bronchitis in the appropriate clinical setting. No consolidative pneumonia is seen. Costophrenic angles are seen without effusion. No pneumothorax is identified. The heart is top normal in size. The mediastinum is within normal limits. Osseous structures appear within normal limits. The visualized upper abdomen is unremarkable.                                 Medical Decision Making:   Initial Assessment:   34-year-old female with history of heroin abuse, hepatitis-C.  Patient presents to the emergency department with complaint of shortness of breath fever with new onset murmur after IVDA.  Differential Diagnosis:   Endocarditis, myocarditis, bacteremia, septicemia, congestive heart failure, cotton fever from injection  Clinical Tests:   Lab Tests: Ordered and Reviewed  Radiological Study: Ordered and Reviewed  Medical Tests: Ordered and Reviewed  ED Management:  Patient seen evaluated emergency department with history of IVDA secondary to heroin abuse.  Patient found with new onset heart murmur as well as pulmonary vascular congestion.  At this time patient has no Janeway lesion or Christopher spots on examination, however presentation is concerning and consistent for possible endocarditis.  Bedside ultrasound performed which showed patient have a thickened mitral valve as well as thickened myocardium.  At this time patient will be empirically covered for endocarditis.  Will be admitted to hospitalist service  intensive care unit with consult to Cardiology.              Attending Attestation:         Attending Critical Care:   Critical Care Times:   Direct Patient Care (initial evaluation, reassessments, and time considering the case)................................................................30 minutes.   Additional History from reviewing old medical records or taking additional history from the family, EMS, PCP, etc.......................5 minutes.   Ordering, Reviewing, and Interpreting Diagnostic Studies...............................................................................................................10 minutes.   Documentation..................................................................................................................................................................................10 minutes.   Consultation with other Physicians. .................................................................................................................................................10 minutes.   ==============================================================  · Total Critical Care Time - exclusive of procedural time: 65 minutes.  ==============================================================  Critical care was necessary to treat or prevent imminent or life-threatening deterioration of the following conditions: congestive heart failure.   Critical care was time spent personally by me on the following activities: obtaining history from patient or relative, examination of patient, review of x-rays / CT sent with the patient, ordering lab, x-rays, and/or EKG, development of treatment plan with patient or relative, ordering and performing treatments and interventions, evaluation of patient's response to treatment, discussions with primary provider, discussion with consultants, interpretation of cardiac measurements and re-evaluation of patient's conition.   Critical Care Condition: potentially  life-threatening                             Clinical Impression:       ICD-10-CM ICD-9-CM   1. Endocarditis, unspecified chronicity, unspecified endocarditis type I38 424.90   2. Congestive heart failure, unspecified HF chronicity, unspecified heart failure type I50.9 428.0   3. Heroin abuse F11.10 305.50                                Pio Newton MD  02/29/20 1651

## 2020-02-29 NOTE — ED PROVIDER NOTES
"Encounter Date: 2020       History     Chief Complaint   Patient presents with    drug abuse     HPI     Pt states she uses heroine and is currently having body aches and hot flashes. States she is having "cotton fever" from drug use. Pt last used yesterday.  Review of patient's allergies indicates:  No Known Allergies  Past Medical History:   Diagnosis Date    Hepatitis C      Past Surgical History:   Procedure Laterality Date     SECTION       No family history on file.  Social History     Tobacco Use    Smoking status: Current Every Day Smoker     Packs/day: 0.50     Types: Cigarettes    Smokeless tobacco: Never Used   Substance Use Topics    Alcohol use: Yes    Drug use: Not Currently     Review of Systems    Physical Exam     Initial Vitals [20 1447]   BP Pulse Resp Temp SpO2   113/61 (!) 114 (!) 22 98.9 °F (37.2 °C) 95 %      MAP       --         Physical Exam    ED Course   Procedures  Labs Reviewed   CULTURE, BLOOD   CULTURE, BLOOD   CULTURE, BLOOD   CBC W/ AUTO DIFFERENTIAL   COMPREHENSIVE METABOLIC PANEL   TROPONIN I   B-TYPE NATRIURETIC PEPTIDE   COMPREHENSIVE METABOLIC PANEL   LACTIC ACID, PLASMA   URINALYSIS, REFLEX TO URINE CULTURE   INFLUENZA A AND B ANTIGEN   MAGNESIUM   PHOSPHORUS   APTT   PROTIME-INR   PROCALCITONIN   DRUG SCREEN PANEL, URINE EMERGENCY   ALCOHOL,MEDICAL (ETHANOL)   CK   CK-MB        ECG Results          EKG 12-lead (In process)  Result time 20 15:06:19    In process by Interface, Lab In [facility] (20 15:06:19)                 Narrative:    Test Reason : R07.9,    Vent. Rate : 117 BPM     Atrial Rate : 117 BPM     P-R Int : 202 ms          QRS Dur : 096 ms      QT Int : 274 ms       P-R-T Axes : 062 052 056 degrees     QTc Int : 382 ms    Sinus tachycardia  Possible Left atrial enlargement  Nonspecific ST and T wave abnormality  Abnormal ECG  No previous ECGs available    Referred By:             Confirmed By:                             Imaging " Results          X-Ray Chest AP Portable (In process)                                                  Clinical Impression:   {Add your Clinical Impression here. If you haven't documented one yet, please pend the note, finalize a Clinical Impression, and refresh your note before signing.:[id number]}

## 2020-02-29 NOTE — ASSESSMENT & PLAN NOTE
Patient getting admitted with suspected infective endocarditis  She will be started on IV vancomycin and IV Rocephin  A 2D echocardiogram will be ordered  Cardiologist and Infectious Disease MD will be consulted

## 2020-02-29 NOTE — ASSESSMENT & PLAN NOTE
Patient uses heroin on daily basis  Watch closely/monitor for withdrawal symptoms  Patient is very keen to kick out this habit

## 2020-02-29 NOTE — H&P
Former[facility] Medicine  History & Physical    Patient Name: Soraya Gee  MRN: [id number]  Admission Date: 2020  Attending Physician: Pio Newton MD   Primary Care Provider: Jus Griggs Iii, MD         Patient information was obtained from patient and ER records.     Subjective:     Principal Problem:Acute endocarditis    Chief Complaint:   Chief Complaint   Patient presents with    drug abuse        HPI: 34-year-old patient getting admitted with suspected sepsis from acute infective endocarditis  Patient is a IV drug abuse and uses heroin on a daily basis  For the past 2 days she has been having fever/myalgia/malaise and generalized weakness all over the body  Later she started having shortness of breath and patient did notice that her legs are getting more swollen  Because of the persistence of symptoms she came to the emergency room and got admitted  Patient initially thought she might have got cotton fever  Per patient if she will not take heroin every day she will develop severe withdrawal symptoms  No other issues.  She lives alone but has got a boyfriend who lives very nearby    Past Medical History:   Diagnosis Date    Hepatitis C        Past Surgical History:   Procedure Laterality Date     SECTION         Review of patient's allergies indicates:  No Known Allergies    No current facility-administered medications on file prior to encounter.      No current outpatient medications on file prior to encounter.     Family History     Problem Relation (Age of Onset)    Hypertension Father        Tobacco Use    Smoking status: Current Every Day Smoker     Packs/day: 0.50     Types: Cigarettes    Smokeless tobacco: Never Used   Substance and Sexual Activity    Alcohol use: Yes    Drug use: Not Currently    Sexual activity: Yes     Partners: Male     Review of Systems   Constitutional: Positive for fatigue. Negative for activity change and appetite change.   HENT:  Negative for congestion and dental problem.    Eyes: Negative for discharge and itching.   Respiratory: Positive for shortness of breath.    Cardiovascular: Negative for chest pain.   Gastrointestinal: Negative for abdominal distention and abdominal pain.   Endocrine: Negative for cold intolerance.   Genitourinary: Negative for difficulty urinating and dysuria.   Musculoskeletal: Negative for arthralgias and back pain.   Skin: Negative for color change.   Neurological: Negative for dizziness and facial asymmetry.   Hematological: Negative for adenopathy.   Psychiatric/Behavioral: Negative for agitation and behavioral problems.     Objective:     Vital Signs (Most Recent):  Temp: 99.1 °F (37.3 °C)(patient given ice chips ok per md) (02/29/20 1700)  Pulse: (!) 117 (02/29/20 1733)  Resp: (!) 41 (02/29/20 1733)  BP: (!) 101/54 (02/29/20 1733)  SpO2: (!) 93 % (02/29/20 1733) Vital Signs (24h Range):  Temp:  [98.9 °F (37.2 °C)-99.1 °F (37.3 °C)] 99.1 °F (37.3 °C)  Pulse:  [112-127] 117  Resp:  [22-58] 41  SpO2:  [93 %-100 %] 93 %  BP: ()/(53-61) 101/54     Weight: 78.9 kg (174 lb)  Body mass index is 26.46 kg/m².    Physical Exam   Constitutional: She is oriented to person, place, and time. She appears well-developed. No distress.   HENT:   Right Ear: External ear normal.   Left Ear: External ear normal.   Eyes: Pupils are equal, round, and reactive to light. EOM are normal.   Neck: Neck supple.   Cardiovascular: Normal rate.   Tachycardic  Systolic murmur   Pulmonary/Chest: Effort normal and breath sounds normal.   Minimal bibasilar crackles on lung bases   Abdominal: Soft. Bowel sounds are normal.   Musculoskeletal: Normal range of motion. She exhibits edema.   Neurological: She is alert and oriented to person, place, and time.   Skin: Skin is warm.   Needle  tracks   Psychiatric: She has a normal mood and affect.   Nursing note and vitals reviewed.        CRANIAL NERVES     CN III, IV, VI   Pupils are equal,  round, and reactive to light.  Extraocular motions are normal.        Significant Labs:   CBC:   Recent Labs   Lab 02/29/20  1515 02/29/20  1528   WBC 11.34  --    HGB 11.9*  --    HCT 34.0* 34*   PLT 87*  --      CMP:   Recent Labs   Lab 02/29/20  1515   *  132*   K 2.5*  2.5*   CL 91*  91*   CO2 26  26   GLU 88  88   BUN 52*  52*   CREATININE 1.8*  1.8*   CALCIUM 8.5*  8.5*   PROT 6.9  6.9   ALBUMIN 2.9*  2.9*   BILITOT 6.2*  6.2*   ALKPHOS 164*  164*   AST 72*  72*   ALT 65*  65*   ANIONGAP 15  15   EGFRNONAA 36.5*  36.5*       Significant Imaging: I have reviewed all pertinent imaging results/findings within the past 24 hours.    Assessment/Plan:     * Acute endocarditis  Patient getting admitted with suspected infective endocarditis  She will be started on IV vancomycin and IV Rocephin  A 2D echocardiogram will be ordered  Cardiologist and Infectious Disease MD will be consulted      TELLY (acute kidney injury)  Creatinine levels on higher range will not give IV fluids because she has got significant edema on the legs and has got bibasilar crackles  Chest x-ray also confirmed mild pulmonary congestion          Hepatitis C  History of hepatitis C  Right now bilirubin level is on the higher range along with slight elevation of hepatic panels  Will do an ultrasound of the abdomen      IVDU (intravenous drug user)  Patient uses heroin on daily basis  Watch closely/monitor for withdrawal symptoms  Patient is very keen to kick out this habit        VTE Risk Mitigation (From admission, onward)         Ordered     enoxaparin injection 40 mg  Daily      02/29/20 1725     IP VTE HIGH RISK PATIENT  Once      02/29/20 1725                   Chapito Gee MD  Department of Hospital Medicine   [facility]

## 2020-02-29 NOTE — ED TRIAGE NOTES
"Pt states she uses heroine and is currently having body aches and hot flashes. States she is having "cotton fever" from drug use. Pt last used yesterday.   "

## 2020-02-29 NOTE — ASSESSMENT & PLAN NOTE
Creatinine levels on higher range will not give IV fluids because she has got significant edema on the legs and has got bibasilar crackles  Chest x-ray also confirmed mild pulmonary congestion

## 2020-02-29 NOTE — HPI
34-year-old patient getting admitted with suspected sepsis from acute infective endocarditis  Patient is a IV drug abuse and uses heroin on a daily basis  For the past 2 days she has been having fever/myalgia/malaise and generalized weakness all over the body  Later she started having shortness of breath and patient did notice that her legs are getting more swollen  Because of the persistence of symptoms she came to the emergency room and got admitted  Patient initially thought she might have got cotton fever  Per patient if she will not take heroin every day she will develop severe withdrawal symptoms  No other issues.  She lives alone but has got a boyfriend who lives very nearby

## 2020-02-29 NOTE — ED NOTES
Patient states started having swelling to legs today with shortness of breath and body hurting states last heroin use yesterday and using cotton as a filter states it is called cotton fever

## 2020-02-29 NOTE — CARE UPDATE
Pt refusing vapotherm now. MD aware. NC at 2LPM placed on pt.        02/29/20 9907   Patient Assessment/Suction   Level of Consciousness (AVPU) alert   Respiratory Effort Unlabored   All Lung Fields Breath Sounds coarse   PRE-TX-O2   O2 Device (Oxygen Therapy) nasal cannula   Flow (L/min) 2   SpO2 (!) 93 %   Pulse (!) 117   Resp (!) 41   BP (!) 101/54

## 2020-03-01 ENCOUNTER — CLINICAL SUPPORT (OUTPATIENT)
Dept: CARDIOLOGY | Facility: HOSPITAL | Age: 35
DRG: 871 | End: 2020-03-01
Attending: INTERNAL MEDICINE
Payer: MEDICAID

## 2020-03-01 LAB
ALBUMIN SERPL BCP-MCNC: 2.4 G/DL (ref 3.5–5.2)
ALLENS TEST: ABNORMAL
ALP SERPL-CCNC: 137 U/L (ref 55–135)
ALT SERPL W/O P-5'-P-CCNC: 54 U/L (ref 10–44)
ANION GAP SERPL CALC-SCNC: 12 MMOL/L (ref 8–16)
ANION GAP SERPL CALC-SCNC: 13 MMOL/L (ref 8–16)
ANION GAP SERPL CALC-SCNC: 14 MMOL/L (ref 8–16)
AORTIC ROOT ANNULUS: 2.83 CM
AORTIC VALVE CUSP SEPERATION: 1.91 CM
AST SERPL-CCNC: 58 U/L (ref 10–40)
AV INDEX (PROSTH): 0.78
AV MEAN GRADIENT: 7 MMHG
AV PEAK GRADIENT: 12 MMHG
AV VALVE AREA: 2.5 CM2
AV VELOCITY RATIO: 70.49
BASOPHILS # BLD AUTO: 0.07 K/UL (ref 0–0.2)
BASOPHILS NFR BLD: 0.4 % (ref 0–1.9)
BILIRUB SERPL-MCNC: 6.9 MG/DL (ref 0.1–1)
BSA FOR ECHO PROCEDURE: 2.01 M2
BUN SERPL-MCNC: 26 MG/DL (ref 6–20)
BUN SERPL-MCNC: 39 MG/DL (ref 6–20)
BUN SERPL-MCNC: 40 MG/DL (ref 6–20)
CALCIUM SERPL-MCNC: 8.2 MG/DL (ref 8.7–10.5)
CALCIUM SERPL-MCNC: 8.3 MG/DL (ref 8.7–10.5)
CALCIUM SERPL-MCNC: 8.7 MG/DL (ref 8.7–10.5)
CHLORIDE SERPL-SCNC: 101 MMOL/L (ref 95–110)
CHLORIDE SERPL-SCNC: 95 MMOL/L (ref 95–110)
CHLORIDE SERPL-SCNC: 96 MMOL/L (ref 95–110)
CO2 SERPL-SCNC: 22 MMOL/L (ref 23–29)
CO2 SERPL-SCNC: 28 MMOL/L (ref 23–29)
CO2 SERPL-SCNC: 28 MMOL/L (ref 23–29)
CREAT SERPL-MCNC: 0.7 MG/DL (ref 0.5–1.4)
CREAT SERPL-MCNC: 1 MG/DL (ref 0.5–1.4)
CREAT SERPL-MCNC: 1 MG/DL (ref 0.5–1.4)
CV ECHO LV RWT: 0.35 CM
DELSYS: ABNORMAL
DIFFERENTIAL METHOD: ABNORMAL
DOP CALC AO PEAK VEL: 1.72 M/S
DOP CALC AO VTI: 28.44 CM
DOP CALC LVOT AREA: 3.2 CM2
DOP CALC LVOT DIAMETER: 2.02 CM
DOP CALC LVOT PEAK VEL: 121.24 M/S
DOP CALC LVOT STROKE VOLUME: 71.11 CM3
DOP CALCLVOT PEAK VEL VTI: 22.2 CM
E WAVE DECELERATION TIME: 223.77 MSEC
E/A RATIO: 1.33
E/E' RATIO: 4.28 M/S
ECHO LV POSTERIOR WALL: 0.84 CM (ref 0.6–1.1)
EOSINOPHIL # BLD AUTO: 0 K/UL (ref 0–0.5)
EOSINOPHIL NFR BLD: 0.3 % (ref 0–8)
ERYTHROCYTE [DISTWIDTH] IN BLOOD BY AUTOMATED COUNT: 13.8 % (ref 11.5–14.5)
EST. GFR  (AFRICAN AMERICAN): >60 ML/MIN/1.73 M^2
EST. GFR  (NON AFRICAN AMERICAN): >60 ML/MIN/1.73 M^2
FRACTIONAL SHORTENING: 29 % (ref 28–44)
GLUCOSE SERPL-MCNC: 88 MG/DL (ref 70–110)
GLUCOSE SERPL-MCNC: 92 MG/DL (ref 70–110)
GLUCOSE SERPL-MCNC: 99 MG/DL (ref 70–110)
GLUCOSE SERPL-MCNC: 99 MG/DL (ref 70–110)
HCO3 UR-SCNC: 25.6 MMOL/L (ref 24–28)
HCT VFR BLD AUTO: 31.2 % (ref 37–48.5)
HCT VFR BLD CALC: 38 %PCV (ref 36–54)
HGB BLD-MCNC: 10.8 G/DL (ref 12–16)
IMM GRANULOCYTES # BLD AUTO: 0.31 K/UL (ref 0–0.04)
IMM GRANULOCYTES NFR BLD AUTO: 2 % (ref 0–0.5)
INTERVENTRICULAR SEPTUM: 0.84 CM (ref 0.6–1.1)
LEFT ATRIUM SIZE: 2.43 CM
LEFT INTERNAL DIMENSION IN SYSTOLE: 3.41 CM (ref 2.1–4)
LEFT VENTRICLE DIASTOLIC VOLUME INDEX: 39.8 ML/M2
LEFT VENTRICLE DIASTOLIC VOLUME: 77.71 ML
LEFT VENTRICLE MASS INDEX: 69 G/M2
LEFT VENTRICLE SYSTOLIC VOLUME INDEX: 17.1 ML/M2
LEFT VENTRICLE SYSTOLIC VOLUME: 33.4 ML
LEFT VENTRICULAR INTERNAL DIMENSION IN DIASTOLE: 4.8 CM (ref 3.5–6)
LEFT VENTRICULAR MASS: 134.97 G
LV LATERAL E/E' RATIO: 3.5 M/S
LV SEPTAL E/E' RATIO: 5.5 M/S
LYMPHOCYTES # BLD AUTO: 1 K/UL (ref 1–4.8)
LYMPHOCYTES NFR BLD: 6.5 % (ref 18–48)
MAGNESIUM SERPL-MCNC: 1.8 MG/DL (ref 1.6–2.6)
MCH RBC QN AUTO: 30.3 PG (ref 27–31)
MCHC RBC AUTO-ENTMCNC: 34.6 G/DL (ref 32–36)
MCV RBC AUTO: 87 FL (ref 82–98)
MONOCYTES # BLD AUTO: 1.3 K/UL (ref 0.3–1)
MONOCYTES NFR BLD: 8.2 % (ref 4–15)
MV PEAK A VEL: 0.58 M/S
MV PEAK E VEL: 0.77 M/S
NEUTROPHILS # BLD AUTO: 12.9 K/UL (ref 1.8–7.7)
NEUTROPHILS NFR BLD: 82.6 % (ref 38–73)
NRBC BLD-RTO: 0 /100 WBC
PCO2 BLDA: 28.4 MMHG (ref 35–45)
PH SMN: 7.56 [PH] (ref 7.35–7.45)
PISA TR MAX VEL: 2.41 M/S
PLATELET # BLD AUTO: 81 K/UL (ref 150–350)
PMV BLD AUTO: 11.9 FL (ref 9.2–12.9)
PO2 BLDA: 50 MMHG (ref 80–100)
POC BE: 3 MMOL/L
POC IONIZED CALCIUM: 1.17 MMOL/L (ref 1.06–1.42)
POC SATURATED O2: 91 % (ref 95–100)
POC TCO2: 26 MMOL/L (ref 23–27)
POTASSIUM BLD-SCNC: 3.7 MMOL/L (ref 3.5–5.1)
POTASSIUM SERPL-SCNC: 2.7 MMOL/L (ref 3.5–5.1)
POTASSIUM SERPL-SCNC: 3.4 MMOL/L (ref 3.5–5.1)
POTASSIUM SERPL-SCNC: 3.8 MMOL/L (ref 3.5–5.1)
PROCALCITONIN SERPL IA-MCNC: 4.83 NG/ML (ref 0–0.5)
PROT SERPL-MCNC: 6.2 G/DL (ref 6–8.4)
PV PEAK VELOCITY: 108.53 CM/S
RA PRESSURE: 3 MMHG
RBC # BLD AUTO: 3.57 M/UL (ref 4–5.4)
SAMPLE: ABNORMAL
SITE: ABNORMAL
SODIUM BLD-SCNC: 136 MMOL/L (ref 136–145)
SODIUM SERPL-SCNC: 136 MMOL/L (ref 136–145)
SODIUM SERPL-SCNC: 136 MMOL/L (ref 136–145)
SODIUM SERPL-SCNC: 137 MMOL/L (ref 136–145)
TDI LATERAL: 0.22 M/S
TDI SEPTAL: 0.14 M/S
TDI: 0.18 M/S
TR MAX PG: 23 MMHG
TV REST PULMONARY ARTERY PRESSURE: 26 MMHG
WBC # BLD AUTO: 15.64 K/UL (ref 3.9–12.7)

## 2020-03-01 PROCEDURE — 87040 BLOOD CULTURE FOR BACTERIA: CPT

## 2020-03-01 PROCEDURE — 25000003 PHARM REV CODE 250: Performed by: INTERNAL MEDICINE

## 2020-03-01 PROCEDURE — 12000002 HC ACUTE/MED SURGE SEMI-PRIVATE ROOM

## 2020-03-01 PROCEDURE — 85014 HEMATOCRIT: CPT

## 2020-03-01 PROCEDURE — 63600175 PHARM REV CODE 636 W HCPCS: Performed by: INTERNAL MEDICINE

## 2020-03-01 PROCEDURE — 82330 ASSAY OF CALCIUM: CPT

## 2020-03-01 PROCEDURE — 25000003 PHARM REV CODE 250

## 2020-03-01 PROCEDURE — 20000000 HC ICU ROOM

## 2020-03-01 PROCEDURE — 80048 BASIC METABOLIC PNL TOTAL CA: CPT | Mod: 91

## 2020-03-01 PROCEDURE — 85025 COMPLETE CBC W/AUTO DIFF WBC: CPT

## 2020-03-01 PROCEDURE — 93306 TTE W/DOPPLER COMPLETE: CPT

## 2020-03-01 PROCEDURE — 99900035 HC TECH TIME PER 15 MIN (STAT)

## 2020-03-01 PROCEDURE — 84132 ASSAY OF SERUM POTASSIUM: CPT

## 2020-03-01 PROCEDURE — 80074 ACUTE HEPATITIS PANEL: CPT

## 2020-03-01 PROCEDURE — 84295 ASSAY OF SERUM SODIUM: CPT

## 2020-03-01 PROCEDURE — 36415 COLL VENOUS BLD VENIPUNCTURE: CPT

## 2020-03-01 PROCEDURE — 94660 CPAP INITIATION&MGMT: CPT

## 2020-03-01 PROCEDURE — 36600 WITHDRAWAL OF ARTERIAL BLOOD: CPT

## 2020-03-01 PROCEDURE — 27000221 HC OXYGEN, UP TO 24 HOURS

## 2020-03-01 PROCEDURE — 94761 N-INVAS EAR/PLS OXIMETRY MLT: CPT

## 2020-03-01 PROCEDURE — 63600175 PHARM REV CODE 636 W HCPCS

## 2020-03-01 PROCEDURE — 84145 PROCALCITONIN (PCT): CPT

## 2020-03-01 PROCEDURE — 80053 COMPREHEN METABOLIC PANEL: CPT

## 2020-03-01 PROCEDURE — 83735 ASSAY OF MAGNESIUM: CPT

## 2020-03-01 PROCEDURE — 82803 BLOOD GASES ANY COMBINATION: CPT

## 2020-03-01 RX ORDER — DEXMEDETOMIDINE HYDROCHLORIDE 4 UG/ML
0.2 INJECTION, SOLUTION INTRAVENOUS CONTINUOUS
Status: DISCONTINUED | OUTPATIENT
Start: 2020-03-01 | End: 2020-03-06

## 2020-03-01 RX ORDER — CALCIUM CHLORIDE IN 0.9 % NACL 1 G/100 ML
1 INTRAVENOUS SOLUTION, PIGGYBACK (ML) INTRAVENOUS
Status: DISCONTINUED | OUTPATIENT
Start: 2020-03-01 | End: 2020-03-18 | Stop reason: HOSPADM

## 2020-03-01 RX ORDER — CEFAZOLIN SODIUM 2 G/50ML
2 SOLUTION INTRAVENOUS
Status: DISCONTINUED | OUTPATIENT
Start: 2020-03-01 | End: 2020-03-02

## 2020-03-01 RX ORDER — HALOPERIDOL 5 MG/ML
5 INJECTION INTRAMUSCULAR ONCE
Status: DISCONTINUED | OUTPATIENT
Start: 2020-03-01 | End: 2020-03-01

## 2020-03-01 RX ORDER — CHLORHEXIDINE GLUCONATE ORAL RINSE 1.2 MG/ML
15 SOLUTION DENTAL 2 TIMES DAILY
Status: COMPLETED | OUTPATIENT
Start: 2020-03-01 | End: 2020-03-05

## 2020-03-01 RX ORDER — LORAZEPAM 2 MG/ML
1 INJECTION INTRAMUSCULAR EVERY 4 HOURS PRN
Status: DISCONTINUED | OUTPATIENT
Start: 2020-03-01 | End: 2020-03-01

## 2020-03-01 RX ORDER — LORAZEPAM 2 MG/ML
2 INJECTION INTRAMUSCULAR EVERY 4 HOURS PRN
Status: DISCONTINUED | OUTPATIENT
Start: 2020-03-01 | End: 2020-03-01

## 2020-03-01 RX ORDER — LORAZEPAM 2 MG/ML
2 INJECTION INTRAMUSCULAR EVERY 4 HOURS PRN
Status: DISCONTINUED | OUTPATIENT
Start: 2020-03-01 | End: 2020-03-02

## 2020-03-01 RX ORDER — METHADONE HYDROCHLORIDE 10 MG/1
20 TABLET ORAL DAILY
Status: DISCONTINUED | OUTPATIENT
Start: 2020-03-01 | End: 2020-03-02

## 2020-03-01 RX ORDER — HALOPERIDOL 5 MG/ML
5 INJECTION INTRAMUSCULAR ONCE
Status: COMPLETED | OUTPATIENT
Start: 2020-03-01 | End: 2020-03-01

## 2020-03-01 RX ORDER — MUPIROCIN 20 MG/G
OINTMENT TOPICAL 2 TIMES DAILY
Status: COMPLETED | OUTPATIENT
Start: 2020-03-01 | End: 2020-03-05

## 2020-03-01 RX ORDER — HYDROMORPHONE HYDROCHLORIDE 1 MG/ML
1 INJECTION, SOLUTION INTRAMUSCULAR; INTRAVENOUS; SUBCUTANEOUS
Status: DISCONTINUED | OUTPATIENT
Start: 2020-03-01 | End: 2020-03-02

## 2020-03-01 RX ORDER — HALOPERIDOL 5 MG/ML
INJECTION INTRAMUSCULAR
Status: COMPLETED
Start: 2020-03-01 | End: 2020-03-01

## 2020-03-01 RX ORDER — LORAZEPAM 2 MG/ML
2 INJECTION INTRAMUSCULAR ONCE
Status: COMPLETED | OUTPATIENT
Start: 2020-03-01 | End: 2020-03-01

## 2020-03-01 RX ORDER — LORAZEPAM 2 MG/ML
2 INJECTION INTRAMUSCULAR
Status: DISCONTINUED | OUTPATIENT
Start: 2020-03-01 | End: 2020-03-01

## 2020-03-01 RX ADMIN — POTASSIUM CHLORIDE 40 MEQ: 20 TABLET, EXTENDED RELEASE ORAL at 01:03

## 2020-03-01 RX ADMIN — LORAZEPAM 0.5 MG: 2 INJECTION INTRAMUSCULAR; INTRAVENOUS at 12:03

## 2020-03-01 RX ADMIN — CHLORHEXIDINE GLUCONATE 15 ML: 1.2 RINSE ORAL at 09:03

## 2020-03-01 RX ADMIN — POTASSIUM CHLORIDE 20 MEQ: 7.46 INJECTION, SOLUTION INTRAVENOUS at 11:03

## 2020-03-01 RX ADMIN — MAGNESIUM SULFATE IN DEXTROSE 1 G: 10 INJECTION, SOLUTION INTRAVENOUS at 06:03

## 2020-03-01 RX ADMIN — CEFTRIAXONE 2 G: 2 INJECTION, SOLUTION INTRAVENOUS at 04:03

## 2020-03-01 RX ADMIN — MUPIROCIN: 20 OINTMENT TOPICAL at 08:03

## 2020-03-01 RX ADMIN — HALOPERIDOL LACTATE 5 MG: 5 INJECTION, SOLUTION INTRAMUSCULAR at 08:03

## 2020-03-01 RX ADMIN — HYDROMORPHONE HYDROCHLORIDE 1 MG: 1 INJECTION, SOLUTION INTRAMUSCULAR; INTRAVENOUS; SUBCUTANEOUS at 10:03

## 2020-03-01 RX ADMIN — LORAZEPAM 2 MG: 2 INJECTION INTRAMUSCULAR; INTRAVENOUS at 03:03

## 2020-03-01 RX ADMIN — POTASSIUM CHLORIDE 40 MEQ: 10 INJECTION, SOLUTION INTRAVENOUS at 12:03

## 2020-03-01 RX ADMIN — MUPIROCIN: 20 OINTMENT TOPICAL at 12:03

## 2020-03-01 RX ADMIN — DAPTOMYCIN 980 MG: 500 INJECTION, POWDER, LYOPHILIZED, FOR SOLUTION INTRAVENOUS at 04:03

## 2020-03-01 RX ADMIN — POTASSIUM CHLORIDE 40 MEQ: 10 INJECTION, SOLUTION INTRAVENOUS at 06:03

## 2020-03-01 RX ADMIN — CEFAZOLIN SODIUM 2 G: 2 SOLUTION INTRAVENOUS at 09:03

## 2020-03-01 RX ADMIN — CEFAZOLIN SODIUM 2 G: 2 SOLUTION INTRAVENOUS at 12:03

## 2020-03-01 RX ADMIN — LORAZEPAM 2 MG: 2 INJECTION INTRAMUSCULAR; INTRAVENOUS at 01:03

## 2020-03-01 RX ADMIN — METHADONE HYDROCHLORIDE 20 MG: 10 TABLET ORAL at 08:03

## 2020-03-01 RX ADMIN — HALOPERIDOL 5 MG: 5 INJECTION INTRAMUSCULAR at 08:03

## 2020-03-01 RX ADMIN — DEXMEDETOMIDINE HYDROCHLORIDE 0.2 MCG/KG/HR: 400 INJECTION INTRAVENOUS at 03:03

## 2020-03-01 RX ADMIN — CHLORHEXIDINE GLUCONATE 15 ML: 1.2 RINSE ORAL at 12:03

## 2020-03-01 RX ADMIN — VANCOMYCIN HYDROCHLORIDE 1500 MG: 1.5 INJECTION, POWDER, LYOPHILIZED, FOR SOLUTION INTRAVENOUS at 05:03

## 2020-03-01 RX ADMIN — ENOXAPARIN SODIUM 40 MG: 100 INJECTION SUBCUTANEOUS at 05:03

## 2020-03-01 RX ADMIN — HYDROMORPHONE HYDROCHLORIDE 1 MG: 1 INJECTION, SOLUTION INTRAMUSCULAR; INTRAVENOUS; SUBCUTANEOUS at 12:03

## 2020-03-01 RX ADMIN — CALCIUM CHLORIDE 1 G: 100 INJECTION, SOLUTION INTRAVENOUS at 12:03

## 2020-03-01 RX ADMIN — HYDROMORPHONE HYDROCHLORIDE 1 MG: 1 INJECTION, SOLUTION INTRAMUSCULAR; INTRAVENOUS; SUBCUTANEOUS at 03:03

## 2020-03-01 RX ADMIN — LORAZEPAM 2 MG: 2 INJECTION INTRAMUSCULAR; INTRAVENOUS at 08:03

## 2020-03-01 RX ADMIN — DEXMEDETOMIDINE HYDROCHLORIDE 1.4 MCG/KG/HR: 400 INJECTION INTRAVENOUS at 10:03

## 2020-03-01 RX ADMIN — DEXMEDETOMIDINE HYDROCHLORIDE 0.2 MCG/KG/HR: 400 INJECTION INTRAVENOUS at 04:03

## 2020-03-01 NOTE — NURSING
Pt with worsening agitation, tachycardia and tachypnea. Receiving PRN IVP dilaudid and ativan often (see MAR). Dr. Hummel placed order for Precedex.

## 2020-03-01 NOTE — PLAN OF CARE
Continue to educate patient on plan of care. Pt hallucinating at times and confused. Unable to understand teaching. Continue to monitor and treat pain with PRN meds. Goal 4> (1-10). Pt educated on fall precautions and call light. Pt unable to comprehend instructions. Bed alarm active. Bed in low locked position. Bed rails up x3. RN sitting in alcove to monitor movement. Lighting adjusted. Floor clear of clutter. No family/friends at bedside for support. Possible PICC or Central line placement to be needed for possible pressor support/IV ABX/IV electrolyte replacement. Continue to monitor all vital signs and blood work. Echo ordered for today. Pt moves independently in bed for comfort. Monitor for s/s of stagnation to prevent pressure ulcers. Continue to monitor respiratory status and s/s of decline.

## 2020-03-01 NOTE — PLAN OF CARE
Problem: Skin Injury Risk Increased  Goal: Skin Health and Integrity  Outcome: Ongoing, Progressing     Problem: Infection  Goal: Infection Symptom Resolution  Outcome: Ongoing, Progressing    Admit from ED. Tachycardic and tachypneic.   Daily IV drug user and remains restless despite multiple doses of dilaudid and lorazepam. Lorazepam increased for increased agitation/anxiety with mild resolution of anxiety.   Pt repeatedly attempting to get OOB and drinking fluids despite NPO status - this RN discussed in detail need to stay in bed at this time and importance of NPO status.   Abdominal u/s done this shift at bedside.   Tolerated BiPAP on and off for ~ 3 hours, now on NC.   Dr. Gee at bedside multiple times this shift to appraise pt on changes in POC.   Fall precautions maintained. Bed wheels locked, bed in lowest position, upper SR up x 3, call light in reach, non-skid socks when OOB. Instructed pt to call for assistance as needed. Bed alarm on.

## 2020-03-01 NOTE — HOSPITAL COURSE
Patient was admitted with most likely bacterial endocarditis  She has daily urine IV drug user.  Methadone started yesterday and increased the dose further  Still having persistent fevers, blood culture with persistent  MRSA  Seen and examined the patient she is awake alert oriented and able to answer all the questions, off Precedex  Patient is going to need NAVA    3/3: Ms Gee is sedated on precedex and methadone. Pt arouses but has limited response to questions but she did move all extremities,    3/4: Pt is awake and alert with c/o 8/10 flank pain. Pt has 4/10 pain with oxycontin for break thru pain. She has no neurologic complaints. I have discussed the case with Dr Rodriguez and appreciate her imput. Pt nurse and I discussed the need for rehab Re opiate dependency.    3/5: Pt states that the methadone really helps her pain and opiate cravings. She has taken only 1 oxycodone today. Pt with seek treatment at a methadone clinic post discharge. Her side pain has improved from an 8/10 to < 4/10. Pt has improved enough to be sent to telemetry    3/6: Ms Elizondo has no complaints. Case discussed with Dr Rodriguez may go to an LTiiAC for further therapy. I will consult  for the above    3/7: I am feeling better, my side pain is low( 4/10 at worst) . Pt agrees to LTAC placement    3/8: The patient is achy but comfortable with pain < 4/10    3/9 The patient is not satisfied with the nursing procedures with her IV. She cannot be specific. Pt denies fever, chills , sob, solomon, new sensory or motor deficit. No suicidal or violent ideation.    3/10:  No pain today.  No SOB.  R ankle edema noted.     3/11:  Pt resting comfortably.  No pain.  No SOB.  No dizziness.     3/12:  LTAC denied.  I will not send home an IVDU with a PICC.  SNF now in progress.  Pt denies pain or SOB.     3/13:  SNF in progress.  No pain, no SOB.  In good spirits.     3/14: no pain.  No SOB.    03/15-03/18 - Assumed care of patient on 03/15.   Being treated for MRSA bacteremia in the setting of IV drug abuse.  Also noted to have tricuspid valve endocarditis on NAVA.  Tolerating antibiotics without any issues.  Initial plan was to discharge her to skilled nursing facility for long-term antibiotics; however it was decided to change to dalbavancin in order to be able to send her home.  Home infusion center to administer antibiotics at home.  Case management has obtained information for methadone clinic outpatient.  Patient has been counseled on opiate use disorder.  She is keen to stay off illicit drugs.  Advised to follow up with consultants as mentioned below.  Medically stable to be discharged home.    Instructions provided to follow up with primary care physician as outpatient. Patient verbalized understanding and is aware to contact primary care physician or return to ED if new or worsening symptoms.    Physical exam on the day of discharge:  General: Patient resting comfortably in no acute distress.  Lungs: CTA. Good air entry.  Cor: Regular rate and rhythm. No murmurs. No pedal edema.  Abd: Soft. Nontender. Non-distended.  Neuro: A&O x3. Moving all 4 extremities equally  Ext: No clubbing. No cyanosis.

## 2020-03-01 NOTE — PLAN OF CARE
02/29/20 2704   Patient Assessment/Suction   Level of Consciousness (AVPU) alert   Respiratory Effort Moderate;Labored   Expansion/Accessory Muscles/Retractions no use of accessory muscles   All Lung Fields Breath Sounds diminished  (rll decreased left clear)   Rhythm/Pattern, Respiratory tachypneic   Cough Frequency no cough   PRE-TX-O2   O2 Device (Oxygen Therapy) BiPAP   SpO2 96 %   Pulse Oximetry Type Continuous   $ Pulse Oximetry - Multiple Charge Pulse Oximetry - Multiple   Pulse 108   Resp (!) 57   Preset CPAP/BiPAP Settings   Mode Of Delivery BiPAP S/T   $ CPAP/BiPAP Daily Charge BiPAP/CPAP Daily   $ Initial CPAP/BiPAP Setup? Yes   $ Is patient using? Yes   Size of Mask   (B)   Sized Appropriately? Yes   Equipment Type V60   Ipap 16   EPAP (cm H2O) 8   Pressure Support (cm H2O) 8   ITime (sec) 0.8   Rise Time (sec) 3   Patient CPAP/BiPAP Settings   RR Total (Breaths/Min) 38   Tidal Volume (mL) 40   VE Minute Ventilation (L/min) 15.7 L/min   Peak Inspiratory Pressure (cm H2O) 17   TiTOT (%) 33   Total Leak (L/Min) 2   Patient Trigger - ST Mode Only (%) 100   CPAP/BiPAP Alarms   High Pressure (cm H2O) 40   Low Pressure (cm H2O) 10   Minute Ventilation (L/Min) 3   High RR (breaths/min) 60   Low RR (breaths/min) 10   Apnea (Sec) 20   maintain adequate oxygenantion/wean as tolerated off bi-pap

## 2020-03-01 NOTE — ASSESSMENT & PLAN NOTE
Patient getting admitted with suspected infective endocarditis      Changed antibiotic as per ID direction   A 2D echocardiogram/she will need NAVA  Follow card and ID   PICC line placement ordered

## 2020-03-01 NOTE — CONSULTS
"Consult Note  Infectious Disease    Reason for Consult:      HPI: Soraya Gee is a  appearing 34 y.o. female with past medical history of hepatitis-C, IV drug use, in present for 3 years, released in 2019, that is when she resumed IV drugs.    She comes in complaining of shaking chills, fever, myalgia, malaise, generalized weakness, 3 days duration after injecting some "bad drugs".  Initially she thought she had cotton fever but the symptoms persisted so she came to ER.  Patient also complains of shortness of breath, she was tachycardic and tachypneic on admission.  She has noticed swelling throughout.  Her albumin is low    Patient is a IV drug abuse and uses heroin twice a day, for a total daily dose of 1 g.  No sick contacts.  She is admitted in ICU for closer observation to make sure she does not withdrawal.  Patient is tachycardic, tachypneic, hurting all over, her history is limited by although this    Review of patient's allergies indicates:  No Known Allergies  Past Medical History:   Diagnosis Date    Hepatitis C      Past Surgical History:   Procedure Laterality Date     SECTION       Social History     Socioeconomic History    Marital status: Single     Spouse name: Not on file    Number of children: Not on file    Years of education: Not on file    Highest education level: Not on file   Occupational History    Not on file   Social Needs    Financial resource strain: Not on file    Food insecurity:     Worry: Not on file     Inability: Not on file    Transportation needs:     Medical: Not on file     Non-medical: Not on file   Tobacco Use    Smoking status: Current Every Day Smoker     Packs/day: 1.00     Years: 8.00     Pack years: 8.00     Types: Cigarettes    Smokeless tobacco: Never Used   Substance and Sexual Activity    Alcohol use: Yes    Drug use: Yes     Frequency: 14.0 times per week     Types: Heroin     Comment: Every day twice a day    Sexual activity: Yes     " Partners: Male   Lifestyle    Physical activity:     Days per week: Not on file     Minutes per session: Not on file    Stress: Not on file   Relationships    Social connections:     Talks on phone: Not on file     Gets together: Not on file     Attends Anabaptist service: Not on file     Active member of club or organization: Not on file     Attends meetings of clubs or organizations: Not on file     Relationship status: Not on file   Other Topics Concern    Not on file   Social History Narrative    Not on file     Family History   Problem Relation Age of Onset    Hypertension Father        Pertinent medications noted:     Review of Systems:   Positive for chills, fever, sweats,   Unknown if weight loss  No change in vision, loss of vision or diplopia  No sinus congestion, purulent nasal discharge, post nasal drip or facial pain  No pain in mouth or throat.  Wears dentures  No chest pain, palpitations, syncope  No cough, sputum production, shortness of breath, dyspnea on exertion, pleurisy, hemoptysis  No nausea, vomiting, diarrhea, constipation, blood in stool, or focal abd pain,  No dysphagia, odynophagia  No dysuria, hematuria, strangury, retention, incontinence, nocturia, prostatism,   No vaginal/penile discharge, genital ulcers; sexually active with 1 male partner  No swelling of joints, redness of joints, injuries, or new focal pain  Hurts all over.  Swollen throughout especially lower extremities.  No unusual headaches, dizziness, vertigo, numbness, paresthesias, neuropathy, falls  No anxiety, depression,   Positive for substance abuse,   Denies sleep disturbance  No diabetes, thyroid, hypogonadal conditions  No bleeding, lymphadenopathy, anemia, malignancy, unusual bruising  No new rashes, lesions, or wounds  Denies TB exposure, although she has been imprisoned for 3 years  No recent/prior steroids  Outdoor activities:  Travel:  Not recently  Implants:  No  Antibiotic History:  No    EXAM & DIAGNOSTICS  REVIEWED:   Vitals:     Temp:  [98.3 °F (36.8 °C)-99.1 °F (37.3 °C)]   Temp: 98.7 °F (37.1 °C) (02/29/20 1901)  Pulse: 107 (02/29/20 1901)  Resp: (!) 33 (02/29/20 1901)  BP: (!) 103/57 (02/29/20 1901)  SpO2: 97 % (02/29/20 1901)    Intake/Output Summary (Last 24 hours) at 2/29/2020 2008  Last data filed at 2/29/2020 1727  Gross per 24 hour   Intake 150 ml   Output 1000 ml   Net -850 ml       General:  In NAD. Alert and attentive, cooperative, tachycardic tachypneic, uncomfortable due to pain and hurting all over and uncomfortable probably due to withdrawing from opiates  Eyes:  Anicteric, PERRL, EOMI  ENT:  No ulcers, exudates, thrush, nares patent, edentulous.  Dry lips  Neck:  supple, no masses or adenopathy appreciated  Lungs: Tachypneic,faint crackles   Heart:  Tachycardic.  Systolic murmur, functional due to tachycardia?  Abd:  Soft, NT, ND, normal BS, no masses or organomegaly appreciated.  :  Voids/Vasquez, urine clear, no flank tenderness  Musc:  Joints without effusion, swelling, erythema, synovitis, muscle wasting.   Skin:  Tracking due to drug use.  Scratches in upper and lower extremities.  I do not see any specific embolic phenomena.   Wound:   Neuro: Alert, attentive, speech fluent, face symmetric, moves all extremities, no focal weakness. Ambulatory  Psych:  Slightly anxious, understandably cooperative  Lymphatic:     No cervical, supraclavicular, axillary, or inguinal nodes  Extrem:  Positive for +3 edema, .  No erythema, phlebitis, cellulitis, warm and well perfused  VAD:       Isolation:  No    Lines/Tubes/Drains:  Peripheral IV    General Labs reviewed:  Recent Labs   Lab 02/29/20  1515 02/29/20  1528   WBC 11.34  --    HGB 11.9*  --    HCT 34.0* 34*   PLT 87*  --        Recent Labs   Lab 02/29/20  1515   *  132*   K 2.5*  2.5*   CL 91*  91*   CO2 26  26   BUN 52*  52*   CREATININE 1.8*  1.8*   CALCIUM 8.5*  8.5*   PROT 6.9  6.9   BILITOT 6.2*  6.2*   ALKPHOS 164*  164*   ALT 65*   65*   AST 72*  72*           Micro:  Microbiology Results (last 7 days)     Procedure Component Value Units Date/Time    Blood culture [[id number]] Collected:  02/29/20 1605    Order Status:  Sent Specimen:  Blood from Peripheral, Upper Arm, Right Updated:  02/29/20 1616    Blood culture x two cultures. Draw prior to antibiotics. [[id number]] Collected:  02/29/20 1542    Order Status:  Sent Specimen:  Blood from Peripheral, Forearm, Right Updated:  02/29/20 1558    Urine culture [[id number]] Collected:  02/29/20 1522    Order Status:  No result Specimen:  Urine Updated:  02/29/20 1547    Blood culture x two cultures. Draw prior to antibiotics. [[id number]] Collected:  02/29/20 1517    Order Status:  Sent Specimen:  Blood from Peripheral, Forearm, Right Updated:  02/29/20 1521        Imaging Reviewed:  CXR Mild central hilar bronchovascular crowding, possibly secondary to low lung volumes and atelectasis or very mild edema, or atypical infection/bronchitis as above    US1.  Sludge seen in the gallbladder, without findings to specifically suggest acute cholecystitis (noting borderline gallbladder wall thickening).  2.  Rounded echogenic focus in the right lobe of the liver, with imaging features of an hepatic hemangioma, consider ultrasound follow-up in 6 months to document stability.  3.  Numerous small periportal lymph nodes, likely reactive/inflammatory in nature.  Consider correlation with liver function tests and hepatitis serologies.    Cardiology:  Sinus tachycardia    IMPRESSION & PLAN     Fever,  Leukocytosis,  Hear murmur?.  Probable bacteremia, endocarditis  procal 5.9  CRP 24  ESR 44  TELLY  IVDU. heroin  Transaminitis, heavy opiate use, Ho Hep C  anemia  In present for 3 years.  She came out of present in March 2019.   Protein malnutrition.  Hypoalbuminemia =  2.6  This patient is high risk for life-threatening deterioration and death secondary to above comorbidities and need for IV  treatment        Recommendations:  Continue broad antimicrobial coverage  Follow labs, markers of inflammation, daily blood cultures.  Follow ECHO  She will need tetanus vaccine low later because she is a drug user.  She hurts all over and she has a hard time turning side to side.  I did not find  any specific tenderness on her spine.  Will monitor carefully for possibility of diskitis.

## 2020-03-01 NOTE — H&P (VIEW-ONLY)
[facility]  Cardiology  Consult Note    Patient Name: Soraya Gee  MRN: [id number]  Admission Date: 2020  Hospital Length of Stay: 1 days  Code Status: Full Code   Attending Provider: Og Thompson MD   Consulting Provider: Vanessa Carson NP  Primary Care Physician: Jus Griggs Iii, MD  Principal Problem:Acute endocarditis    Patient information was obtained from patient and ER records.     Inpatient consult to Cardiology  Consult performed by: Nate Kaiser MD  Consult ordered by: Chapito Gee MD        Subjective:     REASON FOR CONSULT:  Elevated troponin/suspected endocarditis.      HPI: Ms. Gee is a 34 year old female with past medical history significant for IV heroin use for the past 8 years and Hepatitis C presents with complaint of 2 day history of fever/myalgias and generalized weakness. Presently she is sedated on Precedex in the ICU. Her friend is present at the bedside. She reports that Ms. Gee uses heroin on a daily basis and often reuses needles. Reports she also noticed increased leg swelling. No family members are present, reportedly she does have 2 children. Work up revealed elevated troponin to 0.15, elevated ESR And procalcitonin. Creatinine initially 1.8, now down to 1.0. ECG shows lateral T wave abnormality. WBC 15. Blood culture came back positive for gram + cocci. Urine + for leukocytes and RBCs. She has been started on Vanc and Rocephin. Last night she received Lasix IV and diuresed 2L. She has not reported any chest pains. This morning she is febrile at 101.3.     Past Medical History:   Diagnosis Date    Hepatitis C        Past Surgical History:   Procedure Laterality Date     SECTION         Review of patient's allergies indicates:  No Known Allergies    No current facility-administered medications on file prior to encounter.      No current outpatient medications on file prior to encounter.       Scheduled Meds:   cefTRIAXone (ROCEPHIN) IVPB   2 g Intravenous Q12H    enoxaparin  40 mg Subcutaneous Daily    fluconazole (DIFLUCAN) IVPB  400 mg Intravenous Q24H    vancomycin (VANCOCIN) IVPB  20 mg/kg Intravenous Q24H     Continuous Infusions:   dexmedetomidine (PRECEDEX) infusion 0.2 mcg/kg/hr (03/01/20 0700)     PRN Meds:.acetaminophen, calcium chloride IVPB, calcium chloride IVPB, calcium chloride IVPB, HYDROmorphone, lorazepam, magnesium oxide, magnesium sulfate IVPB, magnesium sulfate IVPB, magnesium sulfate IVPB, magnesium sulfate IVPB, ondansetron, potassium chloride in water, potassium chloride in water, potassium chloride in water, potassium chloride in water, potassium chloride, potassium chloride, potassium chloride, potassium chloride, Pharmacy to dose Vancomycin consult **AND** vancomycin - pharmacy to dose    Family History     Problem Relation (Age of Onset)    Hypertension Father          Tobacco Use    Smoking status: Current Every Day Smoker     Packs/day: 1.00     Years: 8.00     Pack years: 8.00     Types: Cigarettes    Smokeless tobacco: Never Used   Substance and Sexual Activity    Alcohol use: Yes    Drug use: Yes     Frequency: 14.0 times per week     Types: Heroin     Comment: Every day twice a day    Sexual activity: Yes     Partners: Male       ROS   Unable to obtain- patient sedated  Objective:     Vital Signs (Most Recent):  Temp: (!) 101.3 °F (38.5 °C) (03/01/20 0700)  Pulse: 94 (03/01/20 0700)  Resp: (!) 42 (03/01/20 0700)  BP: (!) 93/52 (03/01/20 0700)  SpO2: 95 % (03/01/20 0737) Vital Signs (24h Range):  Temp:  [98.3 °F (36.8 °C)-101.3 °F (38.5 °C)] 101.3 °F (38.5 °C)  Pulse:  [] 94  Resp:  [22-88] 42  SpO2:  [92 %-100 %] 95 %  BP: ()/(47-62) 93/52     Weight: 81.5 kg (179 lb 9.6 oz)  Body mass index is 27.31 kg/m².    SpO2: 95 %  O2 Device (Oxygen Therapy): nasal cannula      Intake/Output Summary (Last 24 hours) at 3/1/2020 0912  Last data filed at 3/1/2020 0700  Gross per 24 hour   Intake 400 ml    Output 3725 ml   Net -3325 ml       Lines/Drains/Airways     Drain                 Urethral Catheter 02/29/20 1600 less than 1 day          Peripheral Intravenous Line                 Peripheral IV - Single Lumen 02/29/20 1510 20 G Right Forearm less than 1 day         Peripheral IV - Single Lumen 02/29/20 1600 20 G Right Upper Arm less than 1 day                Physical Exam  HEENT: Normocephalic, atraumatic, PERRL, Conjunctiva pink, no scleral icterus.   CVS: S1S2+, RRR, + murmur, rubs or gallops, JVP: Normal.  LUNGS: Clear  ABDOMEN: Soft, NT, BS+  EXTREMITIES: No cyanosis, clubbing, 1+ edema  NEURO: Sedated      Significant Labs:   ABG:   Recent Labs   Lab 02/29/20  1528 02/29/20  2311   PH 7.555* 7.467*   PCO2 30.5* 42.2   HCO3 27.1 30.5*   POCSATURATED 93* 98   BE 5 7   , Blood Culture:   Recent Labs   Lab 02/29/20  1517 02/29/20  1542 02/29/20  1605   LABBLOO Gram stain aer bottle: Gram positive cocci  Gram stain porfirio bottle: Gram positive cocci  Results called to and read back by:Xenia Sargent RN-2B[facility];  03/01/2020    02:44 CJD Gram stain aer bottle: Gram positive cocci  Gram stain porfirio bottle: Gram positive cocci  Results called to and read back by:Xenia Sargent RN-2B[facility];  03/01/2020    02:45 CJD No Growth to date   , BMP:   Recent Labs   Lab 02/29/20  1515 03/01/20  0521   GLU 88  88 99   *  132* 136   K 2.5*  2.5* 2.7*   CL 91*  91* 95   CO2 26  26 28   BUN 52*  52* 40*   CREATININE 1.8*  1.8* 1.0   CALCIUM 8.5*  8.5* 8.2*   MG 1.7 1.8   , CMP   Recent Labs   Lab 02/29/20  1515 03/01/20  0521   *  132* 136   K 2.5*  2.5* 2.7*   CL 91*  91* 95   CO2 26  26 28   GLU 88  88 99   BUN 52*  52* 40*   CREATININE 1.8*  1.8* 1.0   CALCIUM 8.5*  8.5* 8.2*   PROT 6.9  6.9 6.2   ALBUMIN 2.9*  2.9* 2.4*   BILITOT 6.2*  6.2* 6.9*   ALKPHOS 164*  164* 137*   AST 72*  72* 58*   ALT 65*  65* 54*   ANIONGAP 15  15 13   ESTGFRAFRICA 42.0*  42.0* >60.0   EGFRNONAA 36.5*  36.5*  >60.0   , CBC   Recent Labs   Lab 02/29/20  1515  03/01/20  0521   WBC 11.34  --  15.64*   HGB 11.9*  --  10.8*   HCT 34.0*   < > 31.2*   PLT 87*  --  81*    < > = values in this interval not displayed.   , INR   Recent Labs   Lab 02/29/20  1515   INR 1.4   , Lipid Panel No results for input(s): CHOL, HDL, LDLCALC, TRIG, CHOLHDL in the last 48 hours.,   Pathology Results  (Last 10 years)    None      , Troponin   Recent Labs   Lab 02/29/20  1515 02/29/20  1851   TROPONINI 0.069* 0.156*   , All pertinent lab results from the last 24 hours have been reviewed. and   Recent Lab Results       03/01/20  0521   02/29/20  2311   02/29/20  1851   02/29/20  1605   02/29/20  1555        Benzodiazepines               Phencyclidine               POC Molecular Influenza A Ag         Negative     POC Molecular Influenza B Ag         Negative     Procalcitonin 4.83  Comment:  Procalcitonin Result Interpretation:  <=0.50 ng/mL  Systemic infection (sepsis) is not likely. Local bacterial infection   is   possible.  >0.50 and <= 2.00 ng/mL  Systemic infection (sepsis) is possible, but other conditions are   also known   to elevate procalcitonin.   >2.00 ng/mL  Systemic infection (sepsis) is likely unless other causes are known.  >=10.00 ng/mL  Important systemic inflammatory response, almost exclusively due to   severe   bacterial sepsis or septic shock.               Albumin 2.4             Alcohol, Medical, Serum               Alkaline Phosphatase 137             Allens Test   Pass           ALT 54             Amphetamine Screen, Ur               Amylase     25         Anion Gap 13             Appearance, UA               aPTT               AST 58             Bacteria, UA               Barbiturate Screen, Ur               Baso # 0.07             Basophil% 0.4             Bilirubin (UA)               BILIRUBIN TOTAL 6.9  Comment:  For infants and newborns, interpretation of results should be based  on gestational age, weight and in  agreement with clinical  observations.  Premature Infant recommended reference ranges:  Up to 24 hours.............<8.0 mg/dL  Up to 48 hours............<12.0 mg/dL  3-5 days..................<15.0 mg/dL  6-29 days.................<15.0 mg/dL               Blood Culture, Routine       No Growth to date[P]       BNP               Site   LR           BUN, Bld 40             Calcium 8.2             Chloride 95             CO2 28             Cocaine (Metab.)               Color, UA               CPK               CPK MB               Creatinine 1.0             Creatinine, Random Ur               CRP               DelSys   CPAP/BiPAP           Differential Method Automated             eGFR if  >60.0             eGFR if non  >60.0  Comment:  Calculation used to obtain the estimated glomerular filtration  rate (eGFR) is the CKD-EPI equation.                Eos # 0.0             Eosinophil% 0.3             EP   8           FiO2   35           Flow               Glucose 99             Glucose, UA               Gran # (ANC) 12.9             Gran% 82.6             Hematocrit 31.2             Hemoglobin 10.8             Hyaline Casts, UA               Immature Grans (Abs) 0.31  Comment:  Mild elevation in immature granulocytes is non specific and   can be seen in a variety of conditions including stress response,   acute inflammation, trauma and pregnancy. Correlation with other   laboratory and clinical findings is essential.               Immature Granulocytes 2.0             INR               IP   16           Ketones, UA               Lactate, Leonardo     1.7  Comment:  Falsely low lactic acid results can be found in samples   containing >=13.0 mg/dL total bilirubin and/or >=3.5 mg/dL   direct bilirubin.           Leukocytes, UA               Lipase     29         Lymph # 1.0             Lymph% 6.5             Magnesium 1.8             MCH 30.3             MCHC 34.6             MCV 87              Microscopic Comment               Min Vol   12.6           Mode   BiPAP           Mono # 1.3             Mono% 8.2             MPV 11.9             NITRITE UA               nRBC 0             Occult Blood UA               Opiate Scrn, Ur               pH, UA               Phosphorus               Platelets 81             POC BE   7           POC Glucose   99           POC HCO3   30.5           POC Hematocrit   34           POC Ionized Calcium   1.08           POC PCO2   42.2           POC PH   7.467           POC PO2   91           POC Potassium   2.6           POC SATURATED O2   98           POC Sodium   134           POC TCO2   32           Potassium 2.7  Comment:  k  critical result(s) repeated. Called and verbal readback obtained   from haider james rn m2 by [facility] 03/01/2020 06:15               Preg Test, Ur               PROTEIN TOTAL 6.2             Protein, UA               PT                Acceptable         Yes     Rate   20           RBC 3.57             RBC, UA               RDW 13.8             Sample   ARTERIAL           Sed Rate               Sodium 136             Sp02   100           Specific [place], UA               Specimen UA               Spont Rate   12           Squam Epithel, UA               Marijuana (THC) Metabolite               Toxicology Information               Troponin I     0.156  Comment:  trop critical result(s) repeated. Called and verbal readback   obtained from damien mulligan rn mi2 by [facility] 02/29/2020 19:30           Urine Culture, Routine               UROBILINOGEN UA               WBC, UA               WBC 15.64                              02/29/20  1542   02/29/20  1528   02/29/20  1522   02/29/20  1517   02/29/20  1516        Benzodiazepines     Presumptive Positive         Phencyclidine     Negative         POC Molecular Influenza A Ag               POC Molecular Influenza B Ag               Procalcitonin               Albumin               Alcohol, Medical,  Serum               Alkaline Phosphatase               Allens Test   Pass           ALT               Amphetamine Screen, Ur     Negative         Amylase               Anion Gap               Appearance, UA     Hazy         aPTT               AST               Bacteria, UA     Negative         Barbiturate Screen, Ur     Negative         Baso #               Basophil%               Bilirubin (UA)     1+  Comment:  Positive urine bilirubin is not confirmed. Correlate with   serum bilirubin and clinical presentation.           BILIRUBIN TOTAL               Blood Culture, Routine Gram stain aer bottle: Gram positive cocci[P]     Gram stain aer bottle: Gram positive cocci[P]        Gram stain porfirio bottle: Gram positive cocci[P]     Gram stain porfirio bottle: Gram positive cocci[P]        Results called to and read back by:MARILYN Guidry2BANA ROSA;  03/01/2020  [P]     Results called to and read back by:MARILYN Guidry2BANA ROSA;  03/01/2020  [P]        02:45 CJD[P]     02:44 CJD[P]       BNP               Site   RR           BUN, Bld               Calcium               Chloride               CO2               Cocaine (Metab.)     Negative         Color, UA     Yellow         CPK               CPK MB               Creatinine               Creatinine, Random Ur     71.0  Comment:  The random urine reference ranges provided were established   for 24 hour urine collections.  No reference ranges exist for  random urine specimens.  Correlate clinically.           CRP               DelSys   Nasal Can           Differential Method               eGFR if                eGFR if non                Eos #               Eosinophil%               EP               FiO2               Flow   2           Glucose               Glucose, UA     Negative         Gran # (ANC)               Gran%               Hematocrit               Hemoglobin               Hyaline Casts, UA     10         Immature Grans (Abs)                Immature Granulocytes               INR               IP               Ketones, UA     Negative         Lactate, Leonardo 1.9  Comment:  Falsely low lactic acid results can be found in samples   containing >=13.0 mg/dL total bilirubin and/or >=3.5 mg/dL   direct bilirubin.               Leukocytes, UA     1+         Lipase               Lymph #               Lymph%               Magnesium               MCH               MCHC               MCV               Microscopic Comment     SEE COMMENT  Comment:  Other formed elements not mentioned in the report are not   present in the microscopic examination.            Min Vol               Mode   SPONT           Mono #               Mono%               MPV               NITRITE UA     Negative         nRBC               Occult Blood UA     Trace         Opiate Scrn, Ur     Presumptive Positive         pH, UA     6.0         Phosphorus               Platelets               POC BE   5           POC Glucose   93           POC HCO3   27.1           POC Hematocrit   34           POC Ionized Calcium   1.08           POC PCO2   30.5           POC PH   7.555           POC PO2   56           POC Potassium   2.4           POC SATURATED O2   93           POC Sodium   131           POC TCO2   28           Potassium               Preg Test, Ur         Negative     PROTEIN TOTAL               Protein, UA     1+  Comment:  Recommend a 24 hour urine protein or a urine   protein/creatinine ratio if globulin induced proteinuria is  clinically suspected.           PT                Acceptable         Yes     Rate               RBC               RBC, UA     18         RDW               Sample   ARTERIAL           Sed Rate               Sodium               Sp02               Specific [place], UA     1.010         Specimen UA     Urine, Catheterized         Spont Rate               Squam Epithel, UA     1         Marijuana (THC) Metabolite     Negative         Toxicology  Information     SEE COMMENT  Comment:  This screen includes the following classes of drugs at the   listed cut-off:  Benzodiazepines                  200 ng/ml  Cocaine metabolite               300 ng/ml  Opiates                          300 ng/ml  Barbiturates                     200 ng/ml  Amphetamines                    1000 ng/ml  Marijuana metabs (THC)            50 ng/ml  Phencyclidine (PCP)               25 ng/ml  High concentrations of Methylenedioxymethamphetamine (MDMA aka  Ectasy) and other structurally similar compounds may cross-   react with the Amphetamine/Methamphetamine screening   immunoassay giving a false positive result.  Note: This exception list includes only more common   interferants in toxicology screen testing.  Because of many   cross-reactantspositive results on toxicology drug screens   should be confirmed whenever results do not correlate with   clinical presentation.  This report is intended for use in clinical monitoring and  management of patients. It is not intended for use in   employment related drug testing.  Because of any cross-reactants, positive results on toxicology  drug screens should be confirmed whenever results do not  correlate with clinical presentation.  Presumptive positive results are unconfirmed and may be used   only for medical purposes.           Troponin I               Urine Culture, Routine     No growth to date[P]         UROBILINOGEN UA     2.0-3.0         WBC, UA     14         WBC                                02/29/20  1515        Benzodiazepines       Phencyclidine       POC Molecular Influenza A Ag       POC Molecular Influenza B Ag       Procalcitonin 5.95  Comment:  Procalcitonin Result Interpretation:  <=0.50 ng/mL  Systemic infection (sepsis) is not likely. Local bacterial infection   is   possible.  >0.50 and <= 2.00 ng/mL  Systemic infection (sepsis) is possible, but other conditions are   also known   to elevate procalcitonin.   >2.00  ng/mL  Systemic infection (sepsis) is likely unless other causes are known.  >=10.00 ng/mL  Important systemic inflammatory response, almost exclusively due to   severe   bacterial sepsis or septic shock.       Albumin 2.9      2.9     Alcohol, Medical, Serum <5     Alkaline Phosphatase 164      164     Allens Test       ALT 65      65     Amphetamine Screen, Ur       Amylase       Anion Gap 15      15     Appearance, UA       aPTT 30.5  Comment:  Therapeutic Range of 67.5-105.1 secs.  Equivalent to Heparin Concentration of anti-Xa 0.3-0.7 IU/ml.       AST 72      72     Bacteria, UA       Barbiturate Screen, Ur       Baso # 0.07     Basophil% 0.6     Bilirubin (UA)       BILIRUBIN TOTAL 6.2  Comment:  For infants and newborns, interpretation of results should be based  on gestational age, weight and in agreement with clinical  observations.  Premature Infant recommended reference ranges:  Up to 24 hours.............<8.0 mg/dL  Up to 48 hours............<12.0 mg/dL  3-5 days..................<15.0 mg/dL  6-29 days.................<15.0 mg/dL        6.2  Comment:  For infants and newborns, interpretation of results should be based  on gestational age, weight and in agreement with clinical  observations.  Premature Infant recommended reference ranges:  Up to 24 hours.............<8.0 mg/dL  Up to 48 hours............<12.0 mg/dL  3-5 days..................<15.0 mg/dL  6-29 days.................<15.0 mg/dL       Blood Culture, Routine       BNP 87  Comment:  Values of less than 100 pg/ml are consistent with non-CHF populations.     Site       BUN, Bld 52      52     Calcium 8.5      8.5     Chloride 91      91     CO2 26      26     Cocaine (Metab.)       Color, UA       CPK 19     CPK MB 1.5     Creatinine 1.8      1.8     Creatinine, Random Ur       CRP 24.33     DelSys       Differential Method Automated     eGFR if  42.0      42.0     eGFR if non  36.5  Comment:  Calculation used to  obtain the estimated glomerular filtration  rate (eGFR) is the CKD-EPI equation.         36.5  Comment:  Calculation used to obtain the estimated glomerular filtration  rate (eGFR) is the CKD-EPI equation.        Eos # 0.1     Eosinophil% 1.2     EP       FiO2       Flow       Glucose 88      88     Glucose, UA       Gran # (ANC) 9.4     Gran% 82.7     Hematocrit 34.0     Hemoglobin 11.9     Hyaline Casts, UA       Immature Grans (Abs) 0.17  Comment:  Mild elevation in immature granulocytes is non specific and   can be seen in a variety of conditions including stress response,   acute inflammation, trauma and pregnancy. Correlation with other   laboratory and clinical findings is essential.       Immature Granulocytes 1.5     INR 1.4  Comment:  Coumadin Therapy:  INR: 2.0-3.0 conventional anticoagulation  INR: 2.5-3.5 intensive anticoagulation       IP       Ketones, UA       Lactate, Leonardo       Leukocytes, UA       Lipase       Lymph # 1.0     Lymph% 8.4     Magnesium 1.7     MCH 30.8     MCHC 35.0     MCV 88     Microscopic Comment       Min Vol       Mode       Mono # 0.6     Mono% 5.6     MPV 11.4     NITRITE UA       nRBC 0     Occult Blood UA       Opiate Scrn, Ur       pH, UA       Phosphorus 3.6     Platelets 87     POC BE       POC Glucose       POC HCO3       POC Hematocrit       POC Ionized Calcium       POC PCO2       POC PH       POC PO2       POC Potassium       POC SATURATED O2       POC Sodium       POC TCO2       Potassium 2.5  Comment:  Potassium critical result(s) repeated. Called and verbal readback   obtained from Rody Castaneda RN/ED by JB8 02/29/2020 15:53        2.5  Comment:  Potassium critical result(s) repeated. Called and verbal readback   obtained from Rody Castaneda RN/ED by JB8 02/29/2020 15:53       Preg Test, Ur       PROTEIN TOTAL 6.9      6.9     Protein, UA       PT 16.4      Acceptable       Rate       RBC 3.86     RBC, UA       RDW 13.8     Sample       Sed Rate 44      Sodium 132      132     Sp02       Specific Gravity, UA       Specimen UA       Spont Rate       Squam Epithel, UA       Marijuana (THC) Metabolite       Toxicology Information       Troponin I 0.069  Comment:  Troponin critical result(s) repeated. Called and verbal readback   obtained from Rody Castaneda RN/ED by WENDY 02/29/2020 15:57       Urine Culture, Routine       UROBILINOGEN UA       WBC, UA       WBC 11.34           Significant Imaging: X-Ray: CXR: X-Ray Chest 1 View (CXR): No results found for this visit on 02/29/20.    XR CHEST AP PORTABLE [id number]    COMPARISON:  None available.    FINDINGS:  Mildly increased central hilar interstitial opacities are seen bilaterally suggestive of either mild edema  atypical infection/pneumonia or bronchitis in the appropriate clinical setting. No consolidative pneumonia is seen. Costophrenic angles are seen without effusion. No pneumothorax is identified. The heart is top normal in size. The mediastinum is within normal limits. Osseous structures appear within normal limits. The visualized upper abdomen is unremarkable.      Impression       Mild central hilar bronchovascular crowding, possibly secondary to low lung volumes and atelectasis or very mild edema, or atypical infection/bronchitis as above       Assessment and Plan:     IMPRESSION:  Sepsis. Suspected endocarditis.   Elevated troponin. Likely due to infection.  IV drug use with heroin  Tobacco use  TELLY. Improved.   Hepatitis C  Hypokalemia  Thrombocytopenia       RECOMMENDATIONS:  1. Follow up on the echo.  2. Replace K+  3. Will use Lasix as needed- presently she does not appear to be volume overloaded.  4. Follow with ID recs for antibiotics. Patient will need PICC placement. NAVA to be discussed based upon echo findings.   Thank you for your consult. I will follow-up with patient. Please contact us if you have any additional questions.    Vanessa Carson, NP  Cardiology   [facility]    I have  personally seen and examined the patient. I reviewed the notes, assessments, and/or procedures performed by Ms Vanessaaisha Carson, I concur with her documentation of Soraya Gee.  Echo does not reveal any significant valvular abnormalities and no obvious vegetations. Consider repeating limited echo in a few days to evaluate for any vegetations. Discussed with Dr Mccormack.

## 2020-03-01 NOTE — PROGRESS NOTES
"Progress Note  Infectious Disease    Reason for Consult:  Probable endocarditis    02/29/2020.  Consult note. Soraya Gee is a  appearing 34 y.o. female with past medical history of hepatitis-C, IV drug use, in present for 3 years, released in March 2019, that is when she resumed IV drugs.  She comes in complaining of shaking chills, fever, myalgia, malaise, generalized weakness, 3 days duration after injecting some "bad drugs".  Initially she thought she had cotton fever but the symptoms persisted so she came to ER.  Patient also complains of shortness of breath, she was tachycardic and tachypneic on admission.  She has noticed swelling throughout.  Her albumin is low  Patient is a IV drug abuse and uses heroin twice a day, for a total daily dose of 1 g.  No sick contacts.  She is admitted in ICU for closer observation to make sure she does not withdrawal.  Patient is tachycardic, tachypneic, hurting all over, her history is limited by although this    03/01/2020.  Withdrawal symptoms are improved with Precedex drip; although she still feels like she is breathing fast and she is hurting all over.  She spiked fever of 101.3.  Blood cultures came positive for GPC.  No urinary complaints, no new skin or joint complaint, no cough or phlegm.      Antibiotics (From admission, onward)    Start     Stop Route Frequency Ordered    03/01/20 1730  vancomycin 1.5 g in dextrose 5 % 250 mL IVPB (ready to mix)  (vancomycin IVPB)      -- IV Every 24 hours (non-standard times) 02/29/20 1729    03/01/20 1300  DAPTOmycin (CUBICIN) 980 mg in sodium chloride 0.9% IVPB      -- IV Once 03/01/20 1114    03/01/20 1230  mupirocin 2 % ointment  (MRSA Decolonization Orders ST)      03/06 0859 Nasl 2 times daily 03/01/20 1126    02/29/20 1845  cefTRIAXone (ROCEPHIN) 2 g in dextrose 5 % 50 mL IVPB      -- IV Every 12 hours (non-standard times) 02/29/20 1733    02/29/20 1829  vancomycin - pharmacy to dose  (vancomycin IVPB)      -- IV " pharmacy to manage frequency 02/29/20 1729        Antifungals (From admission, onward)    Start     Stop Route Frequency Ordered    02/29/20 2130  fluconazole (DIFLUCAN) IVPB 400 mg 200 mL      -- IV Every 24 hours (non-standard times) 02/29/20 2017        Antivirals (From admission, onward)    None            EXAM & DIAGNOSTICS REVIEWED:   Vitals:     Temp:  [98.3 °F (36.8 °C)-101.3 °F (38.5 °C)]   Temp: 98.5 °F (36.9 °C) (03/01/20 1100)  Pulse: 88 (03/01/20 1100)  Resp: (!) 40 (03/01/20 1100)  BP: (!) 94/55 (03/01/20 1100)  SpO2: 95 % (03/01/20 1100)    Intake/Output Summary (Last 24 hours) at 3/1/2020 1138  Last data filed at 3/1/2020 1100  Gross per 24 hour   Intake 918.4 ml   Output 4145 ml   Net -3226.6 ml       General:  In NAD. cooperative, she is more comfortable compared to yesterday.  She falls asleep easily with parasitic strip.  Eyes:  Anicteric, PERRL, EOMI  ENT:  No ulcers, exudates, thrush, nares patent, edentulous.  Dry lips  Neck:  supple, no masses or adenopathy appreciated  Lungs: Tachypneic,faint crackles   Heart:  Tachycardic.  Systolic murmur, functional due to tachycardia?  Abd:  Soft, ND, normal BS, she hurts all over, she has generalized discomfort in abdomen, slightly worse in epigastric and right upper right flank area.  :  Vasquez catheter, with dark urine that is clearing up no flank tenderness  Musc:  Joints without effusion, swelling, erythema, synovitis, muscle wasting.  I pressed carefully through her spine.  She states she is hurting when palpating at every level.  Skin:  Tracking due to drug use.  Scratches in upper and lower extremities.  I do not see any specific embolic phenomena.   Wound:   Neuro:  speech fluent, face symmetric, moves all extremities, no focal weakness.   Psych:  Slightly anxious, understandably;   Lymphatic:     No cervical, supraclavicular, axillary, or inguinal nodes  Extrem:  Positive for +3 edema, .  No erythema, phlebitis, cellulitis, warm and well  perfused  VAD:       Isolation:  No    Lines/Tubes/Drains:  Peripheral IV    General Labs reviewed:  Recent Labs   Lab 02/29/20  1515 02/29/20  1528 02/29/20  2311 03/01/20  0521   WBC 11.34  --   --  15.64*   HGB 11.9*  --   --  10.8*   HCT 34.0* 34* 34* 31.2*   PLT 87*  --   --  81*       Recent Labs   Lab 02/29/20  1515 03/01/20  0521   *  132* 136   K 2.5*  2.5* 2.7*   CL 91*  91* 95   CO2 26  26 28   BUN 52*  52* 40*   CREATININE 1.8*  1.8* 1.0   CALCIUM 8.5*  8.5* 8.2*   PROT 6.9  6.9 6.2   BILITOT 6.2*  6.2* 6.9*   ALKPHOS 164*  164* 137*   ALT 65*  65* 54*   AST 72*  72* 58*           Micro:  Microbiology Results (last 7 days)     Procedure Component Value Units Date/Time    Blood culture [[id number]] Collected:  02/29/20 1605    Order Status:  Completed Specimen:  Blood from Peripheral, Upper Arm, Right Updated:  03/01/20 1102     Blood Culture, Routine Gram stain aer bottle: Gram positive cocci      Gram stain porfirio bottle: Gram positive cocci      Positive results previously called    Urine culture [[id number]] Collected:  02/29/20 1522    Order Status:  Completed Specimen:  Urine Updated:  03/01/20 0823     Urine Culture, Routine No growth to date    Narrative:       Preferred Collection Type->Urine, Clean Catch  Specimen Source->Urine    Blood culture [[id number]] Collected:  03/01/20 0521    Order Status:  Sent Specimen:  Blood Updated:  03/01/20 0539    Blood culture x two cultures. Draw prior to antibiotics. [[id number]] Collected:  02/29/20 1542    Order Status:  Completed Specimen:  Blood from Peripheral, Forearm, Right Updated:  03/01/20 0245     Blood Culture, Routine Gram stain aer bottle: Gram positive cocci      Gram stain porfirio bottle: Gram positive cocci      Results called to and read back by:Xenia Sargent RN-2[facility];  03/01/2020        02:45 CJD    Narrative:       Aerobic and anaerobic    Blood culture x two cultures. Draw prior to antibiotics. [[id number]] Collected:  02/29/20  1517    Order Status:  Completed Specimen:  Blood from Peripheral, Forearm, Right Updated:  03/01/20 0244     Blood Culture, Routine Gram stain aer bottle: Gram positive cocci      Gram stain porfirio bottle: Gram positive cocci      Results called to and read back by:Xenia Sargent RN-[facility];  03/01/2020        02:44 CJD    Narrative:       Aerobic and anaerobic    Blood culture [[id number]]     Order Status:  Canceled Specimen:  Blood         Imaging Reviewed:  CXR Mild central hilar bronchovascular crowding, possibly secondary to low lung volumes and atelectasis or very mild edema, or atypical infection/bronchitis as above    US1.  Sludge seen in the gallbladder, without findings to specifically suggest acute cholecystitis (noting borderline gallbladder wall thickening).  2.  Rounded echogenic focus in the right lobe of the liver, with imaging features of an hepatic hemangioma, consider ultrasound follow-up in 6 months to document stability.  3.  Numerous small periportal lymph nodes, likely reactive/inflammatory in nature.  Consider correlation with liver function tests and hepatitis serologies.    Cardiology:  Sinus tachycardia  ECHO is performed and pending  IMPRESSION & PLAN     GPC bacteremia.  Probable endocarditis in a patient with history of IV drug use.    Elevated markers of inflammation including procal, CRP, ESR.  A KI.  Improved.  Urine is looking lighter. Vasquez in place  Epigastric right upper quadrant discomfort and generalized abdominal discomfort.  I think that is due to opiate withdrawal.  Ultrasound of right upper quadrant as above.  Monitor.  Transaminitis, heavy opiate use, Ho Hep C  IVDU. heroin  Anemia  Patient was imprisoned is 3 years.  She was released in March 2019.   Protein malnutrition.  Hypoalbuminemia =  2.6  This patient is high risk for life-threatening deterioration and death secondary to above comorbidities and need for IV treatment      Recommendations:  Continue vancomycin.  Change  ceftriaxone to cefazolin.  I called Micro:  GPC is most likely Staph and not Strep.  Tomorrow, depending if this is  MRSA or MSSA will taper therapy further.  Follow labs, markers of inflammation, daily blood cultures.  Follow ECHO results  I ordered tetanus vaccine to be given in a a few days  She hurts all over and she has a hard time turning side to side.  I did not find  any specific tenderness on her spine.  Will monitor carefully for possibility of diskitis.

## 2020-03-01 NOTE — PLAN OF CARE
02/29/20 2308   Patient Assessment/Suction   Level of Consciousness (AVPU) responds to voice   Respiratory Effort Normal;Unlabored   Expansion/Accessory Muscles/Retractions no use of accessory muscles   All Lung Fields Breath Sounds diminished   PRE-TX-O2   O2 Device (Oxygen Therapy) BiPAP   Oxygen Concentration (%) 35   SpO2 100 %   Pulse Oximetry Type Continuous   $ Pulse Oximetry - Multiple Charge Pulse Oximetry - Multiple   Pulse 100   Resp (!) 32   Ready to Wean/Extubation Screen   FIO2<=50 (chart decimal) 0.35   Preset CPAP/BiPAP Settings   Mode Of Delivery BiPAP S/T   $ Is patient using? Yes   Size of Mask   (B)   Sized Appropriately? Yes   Equipment Type V60   Ipap 16   EPAP (cm H2O) 8   Pressure Support (cm H2O) 8   ITime (sec) 0.8   Rise Time (sec) 3   Patient CPAP/BiPAP Settings   RR Total (Breaths/Min) 32   Tidal Volume (mL) 405   VE Minute Ventilation (L/min) 12.6 L/min   Peak Inspiratory Pressure (cm H2O) 17   TiTOT (%) 34   Total Leak (L/Min) 12   Patient Trigger - ST Mode Only (%) 99   Labs   $ Was an ABG obtained? Arterial Puncture;ISTAT - Blood gas;ISTAT - Calcium;ISTAT - Hematocrit;ISTAT - Potassium;ISTAT - Sodium   $ Labs Tech Time 15 min   Critical Value Communication   Date Result Received 02/29/20   Time Result Received 2308   continue tx. As ordered/maintain adequate oxygenation/ventilation/pt. Compliant with bipap

## 2020-03-01 NOTE — SUBJECTIVE & OBJECTIVE
Interval History:     Review of Systems  Objective:     Vital Signs (Most Recent):  Temp: 98.5 °F (36.9 °C) (03/01/20 1100)  Pulse: 86 (03/01/20 1300)  Resp: (!) 38 (03/01/20 1300)  BP: 99/64 (03/01/20 1300)  SpO2: 96 % (03/01/20 1300) Vital Signs (24h Range):  Temp:  [98.3 °F (36.8 °C)-101.3 °F (38.5 °C)] 98.5 °F (36.9 °C)  Pulse:  [] 86  Resp:  [22-88] 38  SpO2:  [92 %-100 %] 96 %  BP: ()/(47-64) 99/64     Weight: 81.5 kg (179 lb 9.6 oz)  Body mass index is 27.31 kg/m².    Intake/Output Summary (Last 24 hours) at 3/1/2020 1409  Last data filed at 3/1/2020 1300  Gross per 24 hour   Intake 1426.6 ml   Output 4450 ml   Net -3023.4 ml      Physical Exam   Constitutional: She is oriented to person, place, and time.   HENT:   Head: Normocephalic and atraumatic.   Eyes: Conjunctivae are normal.   Neck: No JVD present.   Cardiovascular:   Murmur heard.  Pulmonary/Chest: Effort normal and breath sounds normal.   Abdominal: Soft. Bowel sounds are normal.   Neurological: She is alert and oriented to person, place, and time.   Skin: Skin is warm.   Nursing note and vitals reviewed.      Significant Labs:   CBC:   Recent Labs   Lab 02/29/20  1515 02/29/20  1528 02/29/20  2311 03/01/20  0521   WBC 11.34  --   --  15.64*   HGB 11.9*  --   --  10.8*   HCT 34.0* 34* 34* 31.2*   PLT 87*  --   --  81*     CMP:   Recent Labs   Lab 02/29/20  1515 03/01/20  0521 03/01/20  1144   *  132* 136 136   K 2.5*  2.5* 2.7* 3.4*   CL 91*  91* 95 96   CO2 26  26 28 28   GLU 88  88 99 92   BUN 52*  52* 40* 39*   CREATININE 1.8*  1.8* 1.0 1.0   CALCIUM 8.5*  8.5* 8.2* 8.3*   PROT 6.9  6.9 6.2  --    ALBUMIN 2.9*  2.9* 2.4*  --    BILITOT 6.2*  6.2* 6.9*  --    ALKPHOS 164*  164* 137*  --    AST 72*  72* 58*  --    ALT 65*  65* 54*  --    ANIONGAP 15  15 13 12   EGFRNONAA 36.5*  36.5* >60.0 >60.0       Significant Imaging: I have reviewed all pertinent imaging results/findings within the past 24 hours.

## 2020-03-01 NOTE — ASSESSMENT & PLAN NOTE
History of hepatitis C  Right now bilirubin level is on the higher range along with slight elevation of hepatic panels    Plan    ultrasound of the abdomen with hepatic hemangioma, ultrasound follow-up in 6 months   AFP

## 2020-03-01 NOTE — PROGRESS NOTES
VANCOMYCIN PHARMACOKINETIC NOTE:  Vancomycin Day # 1    Objective/Assessment:    Diagnosis/Indication for Vancomycin: Bacteremia    34 y.o., female; Actual Body Weight = 84.5 kg (186 lb 4.8 oz).    The patient has the following labs:  2/29/2020 Estimated Creatinine Clearance: 50.6 mL/min (A) (based on SCr of 1.8 mg/dL (H)). Lab Results   Component Value Date    BUN 52 (H) 02/29/2020    BUN 52 (H) 02/29/2020       Lab Results   Component Value Date    WBC 11.34 02/29/2020          Plan:  Adjust vancomycin dose and/or frequency based on the patient's actual weight and renal function:  Initiate Vancomycin 1500 mg IV every 24 hours.  Orders have been entered into patient's chart.    Vancomycin dose = 15 mg/kg actual body weight    Vancomycin trough level has been ordered for 3/3 @ 1630.    Pharmacy will manage vancomycin therapy, monitor serum vancomycin levels, monitor renal function and adjust regimen as necessary.    Thank you for allowing us to participate in this patient's care.     Marcia Pacheco 2/29/2020 6:40 PM  Department of Pharmacy  Ext [phone]

## 2020-03-01 NOTE — CONSULTS
[facility]  Cardiology  Consult Note    Patient Name: Soraya Gee  MRN: [id number]  Admission Date: 2020  Hospital Length of Stay: 1 days  Code Status: Full Code   Attending Provider: Og Thompson MD   Consulting Provider: Vanessa Carson NP  Primary Care Physician: Jus Griggs Iii, MD  Principal Problem:Acute endocarditis    Patient information was obtained from patient and ER records.     Inpatient consult to Cardiology  Consult performed by: Nate Kaiser MD  Consult ordered by: Chapito Gee MD        Subjective:     REASON FOR CONSULT:  Elevated troponin/suspected endocarditis.      HPI: Ms. Gee is a 34 year old female with past medical history significant for IV heroin use for the past 8 years and Hepatitis C presents with complaint of 2 day history of fever/myalgias and generalized weakness. Presently she is sedated on Precedex in the ICU. Her friend is present at the bedside. She reports that Ms. Gee uses heroin on a daily basis and often reuses needles. Reports she also noticed increased leg swelling. No family members are present, reportedly she does have 2 children. Work up revealed elevated troponin to 0.15, elevated ESR And procalcitonin. Creatinine initially 1.8, now down to 1.0. ECG shows lateral T wave abnormality. WBC 15. Blood culture came back positive for gram + cocci. Urine + for leukocytes and RBCs. She has been started on Vanc and Rocephin. Last night she received Lasix IV and diuresed 2L. She has not reported any chest pains. This morning she is febrile at 101.3.     Past Medical History:   Diagnosis Date    Hepatitis C        Past Surgical History:   Procedure Laterality Date     SECTION         Review of patient's allergies indicates:  No Known Allergies    No current facility-administered medications on file prior to encounter.      No current outpatient medications on file prior to encounter.       Scheduled Meds:   cefTRIAXone (ROCEPHIN) IVPB   2 g Intravenous Q12H    enoxaparin  40 mg Subcutaneous Daily    fluconazole (DIFLUCAN) IVPB  400 mg Intravenous Q24H    vancomycin (VANCOCIN) IVPB  20 mg/kg Intravenous Q24H     Continuous Infusions:   dexmedetomidine (PRECEDEX) infusion 0.2 mcg/kg/hr (03/01/20 0700)     PRN Meds:.acetaminophen, calcium chloride IVPB, calcium chloride IVPB, calcium chloride IVPB, HYDROmorphone, lorazepam, magnesium oxide, magnesium sulfate IVPB, magnesium sulfate IVPB, magnesium sulfate IVPB, magnesium sulfate IVPB, ondansetron, potassium chloride in water, potassium chloride in water, potassium chloride in water, potassium chloride in water, potassium chloride, potassium chloride, potassium chloride, potassium chloride, Pharmacy to dose Vancomycin consult **AND** vancomycin - pharmacy to dose    Family History     Problem Relation (Age of Onset)    Hypertension Father          Tobacco Use    Smoking status: Current Every Day Smoker     Packs/day: 1.00     Years: 8.00     Pack years: 8.00     Types: Cigarettes    Smokeless tobacco: Never Used   Substance and Sexual Activity    Alcohol use: Yes    Drug use: Yes     Frequency: 14.0 times per week     Types: Heroin     Comment: Every day twice a day    Sexual activity: Yes     Partners: Male       ROS   Unable to obtain- patient sedated  Objective:     Vital Signs (Most Recent):  Temp: (!) 101.3 °F (38.5 °C) (03/01/20 0700)  Pulse: 94 (03/01/20 0700)  Resp: (!) 42 (03/01/20 0700)  BP: (!) 93/52 (03/01/20 0700)  SpO2: 95 % (03/01/20 0737) Vital Signs (24h Range):  Temp:  [98.3 °F (36.8 °C)-101.3 °F (38.5 °C)] 101.3 °F (38.5 °C)  Pulse:  [] 94  Resp:  [22-88] 42  SpO2:  [92 %-100 %] 95 %  BP: ()/(47-62) 93/52     Weight: 81.5 kg (179 lb 9.6 oz)  Body mass index is 27.31 kg/m².    SpO2: 95 %  O2 Device (Oxygen Therapy): nasal cannula      Intake/Output Summary (Last 24 hours) at 3/1/2020 0912  Last data filed at 3/1/2020 0700  Gross per 24 hour   Intake 400 ml    Output 3725 ml   Net -3325 ml       Lines/Drains/Airways     Drain                 Urethral Catheter 02/29/20 1600 less than 1 day          Peripheral Intravenous Line                 Peripheral IV - Single Lumen 02/29/20 1510 20 G Right Forearm less than 1 day         Peripheral IV - Single Lumen 02/29/20 1600 20 G Right Upper Arm less than 1 day                Physical Exam  HEENT: Normocephalic, atraumatic, PERRL, Conjunctiva pink, no scleral icterus.   CVS: S1S2+, RRR, + murmur, rubs or gallops, JVP: Normal.  LUNGS: Clear  ABDOMEN: Soft, NT, BS+  EXTREMITIES: No cyanosis, clubbing, 1+ edema  NEURO: Sedated      Significant Labs:   ABG:   Recent Labs   Lab 02/29/20  1528 02/29/20  2311   PH 7.555* 7.467*   PCO2 30.5* 42.2   HCO3 27.1 30.5*   POCSATURATED 93* 98   BE 5 7   , Blood Culture:   Recent Labs   Lab 02/29/20  1517 02/29/20  1542 02/29/20  1605   LABBLOO Gram stain aer bottle: Gram positive cocci  Gram stain porfirio bottle: Gram positive cocci  Results called to and read back by:Xenia Sargent RN-2B[facility];  03/01/2020    02:44 CJD Gram stain aer bottle: Gram positive cocci  Gram stain porfirio bottle: Gram positive cocci  Results called to and read back by:Xenia Sargent RN-2B[facility];  03/01/2020    02:45 CJD No Growth to date   , BMP:   Recent Labs   Lab 02/29/20  1515 03/01/20  0521   GLU 88  88 99   *  132* 136   K 2.5*  2.5* 2.7*   CL 91*  91* 95   CO2 26  26 28   BUN 52*  52* 40*   CREATININE 1.8*  1.8* 1.0   CALCIUM 8.5*  8.5* 8.2*   MG 1.7 1.8   , CMP   Recent Labs   Lab 02/29/20  1515 03/01/20  0521   *  132* 136   K 2.5*  2.5* 2.7*   CL 91*  91* 95   CO2 26  26 28   GLU 88  88 99   BUN 52*  52* 40*   CREATININE 1.8*  1.8* 1.0   CALCIUM 8.5*  8.5* 8.2*   PROT 6.9  6.9 6.2   ALBUMIN 2.9*  2.9* 2.4*   BILITOT 6.2*  6.2* 6.9*   ALKPHOS 164*  164* 137*   AST 72*  72* 58*   ALT 65*  65* 54*   ANIONGAP 15  15 13   ESTGFRAFRICA 42.0*  42.0* >60.0   EGFRNONAA 36.5*  36.5*  >60.0   , CBC   Recent Labs   Lab 02/29/20  1515  03/01/20  0521   WBC 11.34  --  15.64*   HGB 11.9*  --  10.8*   HCT 34.0*   < > 31.2*   PLT 87*  --  81*    < > = values in this interval not displayed.   , INR   Recent Labs   Lab 02/29/20  1515   INR 1.4   , Lipid Panel No results for input(s): CHOL, HDL, LDLCALC, TRIG, CHOLHDL in the last 48 hours.,   Pathology Results  (Last 10 years)    None      , Troponin   Recent Labs   Lab 02/29/20  1515 02/29/20  1851   TROPONINI 0.069* 0.156*   , All pertinent lab results from the last 24 hours have been reviewed. and   Recent Lab Results       03/01/20  0521   02/29/20  2311   02/29/20  1851   02/29/20  1605   02/29/20  1555        Benzodiazepines               Phencyclidine               POC Molecular Influenza A Ag         Negative     POC Molecular Influenza B Ag         Negative     Procalcitonin 4.83  Comment:  Procalcitonin Result Interpretation:  <=0.50 ng/mL  Systemic infection (sepsis) is not likely. Local bacterial infection   is   possible.  >0.50 and <= 2.00 ng/mL  Systemic infection (sepsis) is possible, but other conditions are   also known   to elevate procalcitonin.   >2.00 ng/mL  Systemic infection (sepsis) is likely unless other causes are known.  >=10.00 ng/mL  Important systemic inflammatory response, almost exclusively due to   severe   bacterial sepsis or septic shock.               Albumin 2.4             Alcohol, Medical, Serum               Alkaline Phosphatase 137             Allens Test   Pass           ALT 54             Amphetamine Screen, Ur               Amylase     25         Anion Gap 13             Appearance, UA               aPTT               AST 58             Bacteria, UA               Barbiturate Screen, Ur               Baso # 0.07             Basophil% 0.4             Bilirubin (UA)               BILIRUBIN TOTAL 6.9  Comment:  For infants and newborns, interpretation of results should be based  on gestational age, weight and in  agreement with clinical  observations.  Premature Infant recommended reference ranges:  Up to 24 hours.............<8.0 mg/dL  Up to 48 hours............<12.0 mg/dL  3-5 days..................<15.0 mg/dL  6-29 days.................<15.0 mg/dL               Blood Culture, Routine       No Growth to date[P]       BNP               Site   LR           BUN, Bld 40             Calcium 8.2             Chloride 95             CO2 28             Cocaine (Metab.)               Color, UA               CPK               CPK MB               Creatinine 1.0             Creatinine, Random Ur               CRP               DelSys   CPAP/BiPAP           Differential Method Automated             eGFR if  >60.0             eGFR if non  >60.0  Comment:  Calculation used to obtain the estimated glomerular filtration  rate (eGFR) is the CKD-EPI equation.                Eos # 0.0             Eosinophil% 0.3             EP   8           FiO2   35           Flow               Glucose 99             Glucose, UA               Gran # (ANC) 12.9             Gran% 82.6             Hematocrit 31.2             Hemoglobin 10.8             Hyaline Casts, UA               Immature Grans (Abs) 0.31  Comment:  Mild elevation in immature granulocytes is non specific and   can be seen in a variety of conditions including stress response,   acute inflammation, trauma and pregnancy. Correlation with other   laboratory and clinical findings is essential.               Immature Granulocytes 2.0             INR               IP   16           Ketones, UA               Lactate, Leonardo     1.7  Comment:  Falsely low lactic acid results can be found in samples   containing >=13.0 mg/dL total bilirubin and/or >=3.5 mg/dL   direct bilirubin.           Leukocytes, UA               Lipase     29         Lymph # 1.0             Lymph% 6.5             Magnesium 1.8             MCH 30.3             MCHC 34.6             MCV 87              Microscopic Comment               Min Vol   12.6           Mode   BiPAP           Mono # 1.3             Mono% 8.2             MPV 11.9             NITRITE UA               nRBC 0             Occult Blood UA               Opiate Scrn, Ur               pH, UA               Phosphorus               Platelets 81             POC BE   7           POC Glucose   99           POC HCO3   30.5           POC Hematocrit   34           POC Ionized Calcium   1.08           POC PCO2   42.2           POC PH   7.467           POC PO2   91           POC Potassium   2.6           POC SATURATED O2   98           POC Sodium   134           POC TCO2   32           Potassium 2.7  Comment:  k  critical result(s) repeated. Called and verbal readback obtained   from haider james rn m2 by [facility] 03/01/2020 06:15               Preg Test, Ur               PROTEIN TOTAL 6.2             Protein, UA               PT                Acceptable         Yes     Rate   20           RBC 3.57             RBC, UA               RDW 13.8             Sample   ARTERIAL           Sed Rate               Sodium 136             Sp02   100           Specific [place], UA               Specimen UA               Spont Rate   12           Squam Epithel, UA               Marijuana (THC) Metabolite               Toxicology Information               Troponin I     0.156  Comment:  trop critical result(s) repeated. Called and verbal readback   obtained from damien mulligan rn mi2 by [facility] 02/29/2020 19:30           Urine Culture, Routine               UROBILINOGEN UA               WBC, UA               WBC 15.64                              02/29/20  1542   02/29/20  1528   02/29/20  1522   02/29/20  1517   02/29/20  1516        Benzodiazepines     Presumptive Positive         Phencyclidine     Negative         POC Molecular Influenza A Ag               POC Molecular Influenza B Ag               Procalcitonin               Albumin               Alcohol, Medical,  Serum               Alkaline Phosphatase               Allens Test   Pass           ALT               Amphetamine Screen, Ur     Negative         Amylase               Anion Gap               Appearance, UA     Hazy         aPTT               AST               Bacteria, UA     Negative         Barbiturate Screen, Ur     Negative         Baso #               Basophil%               Bilirubin (UA)     1+  Comment:  Positive urine bilirubin is not confirmed. Correlate with   serum bilirubin and clinical presentation.           BILIRUBIN TOTAL               Blood Culture, Routine Gram stain aer bottle: Gram positive cocci[P]     Gram stain aer bottle: Gram positive cocci[P]        Gram stain porfirio bottle: Gram positive cocci[P]     Gram stain porfiiro bottle: Gram positive cocci[P]        Results called to and read back by:MARILYN Guidry2BANA ROSA;  03/01/2020  [P]     Results called to and read back by:MARILYN Guidry2BANA ROSA;  03/01/2020  [P]        02:45 CJD[P]     02:44 CJD[P]       BNP               Site   RR           BUN, Bld               Calcium               Chloride               CO2               Cocaine (Metab.)     Negative         Color, UA     Yellow         CPK               CPK MB               Creatinine               Creatinine, Random Ur     71.0  Comment:  The random urine reference ranges provided were established   for 24 hour urine collections.  No reference ranges exist for  random urine specimens.  Correlate clinically.           CRP               DelSys   Nasal Can           Differential Method               eGFR if                eGFR if non                Eos #               Eosinophil%               EP               FiO2               Flow   2           Glucose               Glucose, UA     Negative         Gran # (ANC)               Gran%               Hematocrit               Hemoglobin               Hyaline Casts, UA     10         Immature Grans (Abs)                Immature Granulocytes               INR               IP               Ketones, UA     Negative         Lactate, Leonardo 1.9  Comment:  Falsely low lactic acid results can be found in samples   containing >=13.0 mg/dL total bilirubin and/or >=3.5 mg/dL   direct bilirubin.               Leukocytes, UA     1+         Lipase               Lymph #               Lymph%               Magnesium               MCH               MCHC               MCV               Microscopic Comment     SEE COMMENT  Comment:  Other formed elements not mentioned in the report are not   present in the microscopic examination.            Min Vol               Mode   SPONT           Mono #               Mono%               MPV               NITRITE UA     Negative         nRBC               Occult Blood UA     Trace         Opiate Scrn, Ur     Presumptive Positive         pH, UA     6.0         Phosphorus               Platelets               POC BE   5           POC Glucose   93           POC HCO3   27.1           POC Hematocrit   34           POC Ionized Calcium   1.08           POC PCO2   30.5           POC PH   7.555           POC PO2   56           POC Potassium   2.4           POC SATURATED O2   93           POC Sodium   131           POC TCO2   28           Potassium               Preg Test, Ur         Negative     PROTEIN TOTAL               Protein, UA     1+  Comment:  Recommend a 24 hour urine protein or a urine   protein/creatinine ratio if globulin induced proteinuria is  clinically suspected.           PT                Acceptable         Yes     Rate               RBC               RBC, UA     18         RDW               Sample   ARTERIAL           Sed Rate               Sodium               Sp02               Specific [place], UA     1.010         Specimen UA     Urine, Catheterized         Spont Rate               Squam Epithel, UA     1         Marijuana (THC) Metabolite     Negative         Toxicology  Information     SEE COMMENT  Comment:  This screen includes the following classes of drugs at the   listed cut-off:  Benzodiazepines                  200 ng/ml  Cocaine metabolite               300 ng/ml  Opiates                          300 ng/ml  Barbiturates                     200 ng/ml  Amphetamines                    1000 ng/ml  Marijuana metabs (THC)            50 ng/ml  Phencyclidine (PCP)               25 ng/ml  High concentrations of Methylenedioxymethamphetamine (MDMA aka  Ectasy) and other structurally similar compounds may cross-   react with the Amphetamine/Methamphetamine screening   immunoassay giving a false positive result.  Note: This exception list includes only more common   interferants in toxicology screen testing.  Because of many   cross-reactantspositive results on toxicology drug screens   should be confirmed whenever results do not correlate with   clinical presentation.  This report is intended for use in clinical monitoring and  management of patients. It is not intended for use in   employment related drug testing.  Because of any cross-reactants, positive results on toxicology  drug screens should be confirmed whenever results do not  correlate with clinical presentation.  Presumptive positive results are unconfirmed and may be used   only for medical purposes.           Troponin I               Urine Culture, Routine     No growth to date[P]         UROBILINOGEN UA     2.0-3.0         WBC, UA     14         WBC                                02/29/20  1515        Benzodiazepines       Phencyclidine       POC Molecular Influenza A Ag       POC Molecular Influenza B Ag       Procalcitonin 5.95  Comment:  Procalcitonin Result Interpretation:  <=0.50 ng/mL  Systemic infection (sepsis) is not likely. Local bacterial infection   is   possible.  >0.50 and <= 2.00 ng/mL  Systemic infection (sepsis) is possible, but other conditions are   also known   to elevate procalcitonin.   >2.00  ng/mL  Systemic infection (sepsis) is likely unless other causes are known.  >=10.00 ng/mL  Important systemic inflammatory response, almost exclusively due to   severe   bacterial sepsis or septic shock.       Albumin 2.9      2.9     Alcohol, Medical, Serum <5     Alkaline Phosphatase 164      164     Allens Test       ALT 65      65     Amphetamine Screen, Ur       Amylase       Anion Gap 15      15     Appearance, UA       aPTT 30.5  Comment:  Therapeutic Range of 67.5-105.1 secs.  Equivalent to Heparin Concentration of anti-Xa 0.3-0.7 IU/ml.       AST 72      72     Bacteria, UA       Barbiturate Screen, Ur       Baso # 0.07     Basophil% 0.6     Bilirubin (UA)       BILIRUBIN TOTAL 6.2  Comment:  For infants and newborns, interpretation of results should be based  on gestational age, weight and in agreement with clinical  observations.  Premature Infant recommended reference ranges:  Up to 24 hours.............<8.0 mg/dL  Up to 48 hours............<12.0 mg/dL  3-5 days..................<15.0 mg/dL  6-29 days.................<15.0 mg/dL        6.2  Comment:  For infants and newborns, interpretation of results should be based  on gestational age, weight and in agreement with clinical  observations.  Premature Infant recommended reference ranges:  Up to 24 hours.............<8.0 mg/dL  Up to 48 hours............<12.0 mg/dL  3-5 days..................<15.0 mg/dL  6-29 days.................<15.0 mg/dL       Blood Culture, Routine       BNP 87  Comment:  Values of less than 100 pg/ml are consistent with non-CHF populations.     Site       BUN, Bld 52      52     Calcium 8.5      8.5     Chloride 91      91     CO2 26      26     Cocaine (Metab.)       Color, UA       CPK 19     CPK MB 1.5     Creatinine 1.8      1.8     Creatinine, Random Ur       CRP 24.33     DelSys       Differential Method Automated     eGFR if  42.0      42.0     eGFR if non  36.5  Comment:  Calculation used to  obtain the estimated glomerular filtration  rate (eGFR) is the CKD-EPI equation.         36.5  Comment:  Calculation used to obtain the estimated glomerular filtration  rate (eGFR) is the CKD-EPI equation.        Eos # 0.1     Eosinophil% 1.2     EP       FiO2       Flow       Glucose 88      88     Glucose, UA       Gran # (ANC) 9.4     Gran% 82.7     Hematocrit 34.0     Hemoglobin 11.9     Hyaline Casts, UA       Immature Grans (Abs) 0.17  Comment:  Mild elevation in immature granulocytes is non specific and   can be seen in a variety of conditions including stress response,   acute inflammation, trauma and pregnancy. Correlation with other   laboratory and clinical findings is essential.       Immature Granulocytes 1.5     INR 1.4  Comment:  Coumadin Therapy:  INR: 2.0-3.0 conventional anticoagulation  INR: 2.5-3.5 intensive anticoagulation       IP       Ketones, UA       Lactate, Leonardo       Leukocytes, UA       Lipase       Lymph # 1.0     Lymph% 8.4     Magnesium 1.7     MCH 30.8     MCHC 35.0     MCV 88     Microscopic Comment       Min Vol       Mode       Mono # 0.6     Mono% 5.6     MPV 11.4     NITRITE UA       nRBC 0     Occult Blood UA       Opiate Scrn, Ur       pH, UA       Phosphorus 3.6     Platelets 87     POC BE       POC Glucose       POC HCO3       POC Hematocrit       POC Ionized Calcium       POC PCO2       POC PH       POC PO2       POC Potassium       POC SATURATED O2       POC Sodium       POC TCO2       Potassium 2.5  Comment:  Potassium critical result(s) repeated. Called and verbal readback   obtained from Rody Castaneda RN/ED by JB8 02/29/2020 15:53        2.5  Comment:  Potassium critical result(s) repeated. Called and verbal readback   obtained from Rody Castaneda RN/ED by JB8 02/29/2020 15:53       Preg Test, Ur       PROTEIN TOTAL 6.9      6.9     Protein, UA       PT 16.4      Acceptable       Rate       RBC 3.86     RBC, UA       RDW 13.8     Sample       Sed Rate 44      Sodium 132      132     Sp02       Specific Gravity, UA       Specimen UA       Spont Rate       Squam Epithel, UA       Marijuana (THC) Metabolite       Toxicology Information       Troponin I 0.069  Comment:  Troponin critical result(s) repeated. Called and verbal readback   obtained from Rody Castaneda RN/ED by WENDY 02/29/2020 15:57       Urine Culture, Routine       UROBILINOGEN UA       WBC, UA       WBC 11.34           Significant Imaging: X-Ray: CXR: X-Ray Chest 1 View (CXR): No results found for this visit on 02/29/20.    XR CHEST AP PORTABLE [id number]    COMPARISON:  None available.    FINDINGS:  Mildly increased central hilar interstitial opacities are seen bilaterally suggestive of either mild edema  atypical infection/pneumonia or bronchitis in the appropriate clinical setting. No consolidative pneumonia is seen. Costophrenic angles are seen without effusion. No pneumothorax is identified. The heart is top normal in size. The mediastinum is within normal limits. Osseous structures appear within normal limits. The visualized upper abdomen is unremarkable.      Impression       Mild central hilar bronchovascular crowding, possibly secondary to low lung volumes and atelectasis or very mild edema, or atypical infection/bronchitis as above       Assessment and Plan:     IMPRESSION:  Sepsis. Suspected endocarditis.   Elevated troponin. Likely due to infection.  IV drug use with heroin  Tobacco use  TELLY. Improved.   Hepatitis C  Hypokalemia  Thrombocytopenia       RECOMMENDATIONS:  1. Follow up on the echo.  2. Replace K+  3. Will use Lasix as needed- presently she does not appear to be volume overloaded.  4. Follow with ID recs for antibiotics. Patient will need PICC placement. NAVA to be discussed based upon echo findings.   Thank you for your consult. I will follow-up with patient. Please contact us if you have any additional questions.    Vanessa Carson, NP  Cardiology   [facility]    I have  personally seen and examined the patient. I reviewed the notes, assessments, and/or procedures performed by Ms Vanessaaisha Carson, I concur with her documentation of Soraya Gee.  Echo does not reveal any significant valvular abnormalities and no obvious vegetations. Consider repeating limited echo in a few days to evaluate for any vegetations. Discussed with Dr Mccormack.

## 2020-03-01 NOTE — PROGRESS NOTES
[facility]  Progress Note    Patient Name: Soraya Gee  MRN: [id number]  Patient Class: IP- Inpatient   Admission Date: 2/29/2020  Length of Stay: 1 days  Attending Physician: Og Thompson MD  Primary Care Provider: Jus Griggs Iii, MD        Subjective:     Principal Problem:Acute endocarditis        HPI:  No notes on file    Overview/Hospital Course:  Patient was admitted with most consistent with infective endocarditis  Blood cultures positive with gram-positive cocci  Patient is seen and examined she is drowsy on Precedex drip, otherwise vitals stable  No peripheral signs of infective endocarditis appreciated    Interval History:     Review of Systems  Objective:     Vital Signs (Most Recent):  Temp: 98.5 °F (36.9 °C) (03/01/20 1100)  Pulse: 86 (03/01/20 1300)  Resp: (!) 38 (03/01/20 1300)  BP: 99/64 (03/01/20 1300)  SpO2: 96 % (03/01/20 1300) Vital Signs (24h Range):  Temp:  [98.3 °F (36.8 °C)-101.3 °F (38.5 °C)] 98.5 °F (36.9 °C)  Pulse:  [] 86  Resp:  [22-88] 38  SpO2:  [92 %-100 %] 96 %  BP: ()/(47-64) 99/64     Weight: 81.5 kg (179 lb 9.6 oz)  Body mass index is 27.31 kg/m².    Intake/Output Summary (Last 24 hours) at 3/1/2020 1409  Last data filed at 3/1/2020 1300  Gross per 24 hour   Intake 1426.6 ml   Output 4450 ml   Net -3023.4 ml      Physical Exam   Constitutional: She is oriented to person, place, and time.   HENT:   Head: Normocephalic and atraumatic.   Eyes: Conjunctivae are normal.   Neck: No JVD present.   Cardiovascular:   Murmur heard.  Pulmonary/Chest: Effort normal and breath sounds normal.   Abdominal: Soft. Bowel sounds are normal.   Neurological: She is alert and oriented to person, place, and time.   Skin: Skin is warm.   Nursing note and vitals reviewed.      Significant Labs:   CBC:   Recent Labs   Lab 02/29/20  1515 02/29/20  1528 02/29/20  2311 03/01/20  0521   WBC 11.34  --   --  15.64*   HGB 11.9*  --   --  10.8*   HCT 34.0* 34*  34* 31.2*   PLT 87*  --   --  81*     CMP:   Recent Labs   Lab 02/29/20  1515 03/01/20  0521 03/01/20  1144   *  132* 136 136   K 2.5*  2.5* 2.7* 3.4*   CL 91*  91* 95 96   CO2 26  26 28 28   GLU 88  88 99 92   BUN 52*  52* 40* 39*   CREATININE 1.8*  1.8* 1.0 1.0   CALCIUM 8.5*  8.5* 8.2* 8.3*   PROT 6.9  6.9 6.2  --    ALBUMIN 2.9*  2.9* 2.4*  --    BILITOT 6.2*  6.2* 6.9*  --    ALKPHOS 164*  164* 137*  --    AST 72*  72* 58*  --    ALT 65*  65* 54*  --    ANIONGAP 15  15 13 12   EGFRNONAA 36.5*  36.5* >60.0 >60.0       Significant Imaging: I have reviewed all pertinent imaging results/findings within the past 24 hours.      Assessment/Plan:      * Acute endocarditis  Patient getting admitted with suspected infective endocarditis      Changed antibiotic as per ID direction   A 2D echocardiogram/she will need NAVA  Follow card and ID   PICC line placement ordered      TELLY (acute kidney injury)    Slightly improved.        Hepatitis C  History of hepatitis C  Right now bilirubin level is on the higher range along with slight elevation of hepatic panels    Plan    ultrasound of the abdomen with hepatic hemangioma, ultrasound follow-up in 6 months   AFP      IVDU (intravenous drug user)  Patient uses heroin on daily basis  Watch closely/monitor for withdrawal symptoms          VTE Risk Mitigation (From admission, onward)         Ordered     enoxaparin injection 40 mg  Daily      02/29/20 1725     IP VTE HIGH RISK PATIENT  Once      02/29/20 1725                      Og Thompson MD  Department of Hospital Medicine   [facility]

## 2020-03-02 LAB
ALBUMIN SERPL BCP-MCNC: 2.1 G/DL (ref 3.5–5.2)
ALP SERPL-CCNC: 129 U/L (ref 55–135)
ALT SERPL W/O P-5'-P-CCNC: 43 U/L (ref 10–44)
ANION GAP SERPL CALC-SCNC: 8 MMOL/L (ref 8–16)
ANISOCYTOSIS BLD QL SMEAR: SLIGHT
AST SERPL-CCNC: 46 U/L (ref 10–40)
BACTERIA UR CULT: NO GROWTH
BASOPHILS NFR BLD: 0 % (ref 0–1.9)
BILIRUB SERPL-MCNC: 7.4 MG/DL (ref 0.1–1)
BUN SERPL-MCNC: 25 MG/DL (ref 6–20)
CALCIUM SERPL-MCNC: 8.5 MG/DL (ref 8.7–10.5)
CHLORIDE SERPL-SCNC: 100 MMOL/L (ref 95–110)
CO2 SERPL-SCNC: 30 MMOL/L (ref 23–29)
CREAT SERPL-MCNC: 0.6 MG/DL (ref 0.5–1.4)
CRP SERPL-MCNC: 14.94 MG/DL (ref 0–0.75)
DIFFERENTIAL METHOD: ABNORMAL
EOSINOPHIL NFR BLD: 1 % (ref 0–8)
ERYTHROCYTE [DISTWIDTH] IN BLOOD BY AUTOMATED COUNT: 14 % (ref 11.5–14.5)
EST. GFR  (AFRICAN AMERICAN): >60 ML/MIN/1.73 M^2
EST. GFR  (NON AFRICAN AMERICAN): >60 ML/MIN/1.73 M^2
GLUCOSE SERPL-MCNC: 126 MG/DL (ref 70–110)
HCT VFR BLD AUTO: 29.9 % (ref 37–48.5)
HGB BLD-MCNC: 10.3 G/DL (ref 12–16)
IMM GRANULOCYTES # BLD AUTO: ABNORMAL K/UL (ref 0–0.04)
IMM GRANULOCYTES NFR BLD AUTO: ABNORMAL % (ref 0–0.5)
LYMPHOCYTES NFR BLD: 10 % (ref 18–48)
MAGNESIUM SERPL-MCNC: 1.9 MG/DL (ref 1.6–2.6)
MCH RBC QN AUTO: 30.7 PG (ref 27–31)
MCHC RBC AUTO-ENTMCNC: 34.4 G/DL (ref 32–36)
MCV RBC AUTO: 89 FL (ref 82–98)
MONOCYTES NFR BLD: 2 % (ref 4–15)
NEUTROPHILS NFR BLD: 86 % (ref 38–73)
NEUTS BAND NFR BLD MANUAL: 1 %
NRBC BLD-RTO: 0 /100 WBC
PLATELET # BLD AUTO: 88 K/UL (ref 150–350)
PMV BLD AUTO: 11.9 FL (ref 9.2–12.9)
POTASSIUM SERPL-SCNC: 3.2 MMOL/L (ref 3.5–5.1)
PROCALCITONIN SERPL IA-MCNC: 4.71 NG/ML (ref 0–0.5)
PROT SERPL-MCNC: 5.6 G/DL (ref 6–8.4)
RBC # BLD AUTO: 3.35 M/UL (ref 4–5.4)
SODIUM SERPL-SCNC: 138 MMOL/L (ref 136–145)
WBC # BLD AUTO: 15.82 K/UL (ref 3.9–12.7)

## 2020-03-02 PROCEDURE — 85007 BL SMEAR W/DIFF WBC COUNT: CPT

## 2020-03-02 PROCEDURE — 36415 COLL VENOUS BLD VENIPUNCTURE: CPT

## 2020-03-02 PROCEDURE — 63600175 PHARM REV CODE 636 W HCPCS: Performed by: INTERNAL MEDICINE

## 2020-03-02 PROCEDURE — 86140 C-REACTIVE PROTEIN: CPT

## 2020-03-02 PROCEDURE — 12000002 HC ACUTE/MED SURGE SEMI-PRIVATE ROOM

## 2020-03-02 PROCEDURE — 84145 PROCALCITONIN (PCT): CPT

## 2020-03-02 PROCEDURE — 85027 COMPLETE CBC AUTOMATED: CPT

## 2020-03-02 PROCEDURE — 82105 ALPHA-FETOPROTEIN SERUM: CPT

## 2020-03-02 PROCEDURE — 87186 SC STD MICRODIL/AGAR DIL: CPT

## 2020-03-02 PROCEDURE — 87040 BLOOD CULTURE FOR BACTERIA: CPT

## 2020-03-02 PROCEDURE — 94761 N-INVAS EAR/PLS OXIMETRY MLT: CPT

## 2020-03-02 PROCEDURE — 99900035 HC TECH TIME PER 15 MIN (STAT)

## 2020-03-02 PROCEDURE — 25000003 PHARM REV CODE 250: Performed by: INTERNAL MEDICINE

## 2020-03-02 PROCEDURE — 20000000 HC ICU ROOM

## 2020-03-02 PROCEDURE — 83735 ASSAY OF MAGNESIUM: CPT

## 2020-03-02 PROCEDURE — 80053 COMPREHEN METABOLIC PANEL: CPT

## 2020-03-02 PROCEDURE — 25000003 PHARM REV CODE 250

## 2020-03-02 PROCEDURE — 94660 CPAP INITIATION&MGMT: CPT

## 2020-03-02 PROCEDURE — 87077 CULTURE AEROBIC IDENTIFY: CPT

## 2020-03-02 PROCEDURE — 87147 CULTURE TYPE IMMUNOLOGIC: CPT | Mod: 59

## 2020-03-02 RX ORDER — METHADONE HYDROCHLORIDE 10 MG/1
30 TABLET ORAL DAILY
Status: DISCONTINUED | OUTPATIENT
Start: 2020-03-03 | End: 2020-03-18 | Stop reason: HOSPADM

## 2020-03-02 RX ORDER — LORAZEPAM 2 MG/ML
1 INJECTION INTRAMUSCULAR EVERY 4 HOURS PRN
Status: DISCONTINUED | OUTPATIENT
Start: 2020-03-02 | End: 2020-03-06

## 2020-03-02 RX ORDER — METHADONE HYDROCHLORIDE 10 MG/1
10 TABLET ORAL ONCE
Status: COMPLETED | OUTPATIENT
Start: 2020-03-02 | End: 2020-03-02

## 2020-03-02 RX ORDER — HYDROMORPHONE HYDROCHLORIDE 1 MG/ML
0.5 INJECTION, SOLUTION INTRAMUSCULAR; INTRAVENOUS; SUBCUTANEOUS
Status: DISCONTINUED | OUTPATIENT
Start: 2020-03-02 | End: 2020-03-06

## 2020-03-02 RX ADMIN — DEXMEDETOMIDINE HYDROCHLORIDE 0.8 MCG/KG/HR: 400 INJECTION INTRAVENOUS at 05:03

## 2020-03-02 RX ADMIN — CHLORHEXIDINE GLUCONATE 15 ML: 1.2 RINSE ORAL at 08:03

## 2020-03-02 RX ADMIN — MUPIROCIN: 20 OINTMENT TOPICAL at 08:03

## 2020-03-02 RX ADMIN — LORAZEPAM 1 MG: 2 INJECTION INTRAMUSCULAR; INTRAVENOUS at 02:03

## 2020-03-02 RX ADMIN — MUPIROCIN: 20 OINTMENT TOPICAL at 09:03

## 2020-03-02 RX ADMIN — DEXMEDETOMIDINE HYDROCHLORIDE 1 MCG/KG/HR: 400 INJECTION INTRAVENOUS at 01:03

## 2020-03-02 RX ADMIN — VANCOMYCIN HYDROCHLORIDE 1500 MG: 1 INJECTION, POWDER, LYOPHILIZED, FOR SOLUTION INTRAVENOUS at 04:03

## 2020-03-02 RX ADMIN — LORAZEPAM 2 MG: 2 INJECTION INTRAMUSCULAR; INTRAVENOUS at 12:03

## 2020-03-02 RX ADMIN — ACETAMINOPHEN 650 MG: 325 TABLET ORAL at 05:03

## 2020-03-02 RX ADMIN — LORAZEPAM 1 MG: 2 INJECTION INTRAMUSCULAR; INTRAVENOUS at 08:03

## 2020-03-02 RX ADMIN — HYDROMORPHONE HYDROCHLORIDE 1 MG: 1 INJECTION, SOLUTION INTRAMUSCULAR; INTRAVENOUS; SUBCUTANEOUS at 01:03

## 2020-03-02 RX ADMIN — ENOXAPARIN SODIUM 40 MG: 100 INJECTION SUBCUTANEOUS at 04:03

## 2020-03-02 RX ADMIN — CEFAZOLIN SODIUM 2 G: 2 SOLUTION INTRAVENOUS at 04:03

## 2020-03-02 RX ADMIN — POTASSIUM CHLORIDE 40 MEQ: 20 TABLET, EXTENDED RELEASE ORAL at 06:03

## 2020-03-02 RX ADMIN — METHADONE HYDROCHLORIDE 10 MG: 10 TABLET ORAL at 05:03

## 2020-03-02 RX ADMIN — METHADONE HYDROCHLORIDE 20 MG: 10 TABLET ORAL at 08:03

## 2020-03-02 RX ADMIN — HYDROMORPHONE HYDROCHLORIDE 0.5 MG: 1 INJECTION, SOLUTION INTRAMUSCULAR; INTRAVENOUS; SUBCUTANEOUS at 04:03

## 2020-03-02 RX ADMIN — CEFAZOLIN SODIUM 2 G: 2 SOLUTION INTRAVENOUS at 12:03

## 2020-03-02 RX ADMIN — DAPTOMYCIN 980 MG: 500 INJECTION, POWDER, LYOPHILIZED, FOR SOLUTION INTRAVENOUS at 04:03

## 2020-03-02 NOTE — CARE UPDATE
03/01/20 2014   Patient Assessment/Suction   Level of Consciousness (AVPU) alert   Respiratory Effort Mouth breathing;Mild;Labored   Expansion/Accessory Muscles/Retractions no retractions;accessory muscle use   All Lung Fields Breath Sounds clear;diminished   Rhythm/Pattern, Respiratory deep   Cough Frequency no cough   PRE-TX-O2   O2 Device (Oxygen Therapy) nasal cannula   $ Is the patient on Low Flow Oxygen? Yes   Flow (L/min) 6   SpO2 97 %   Pulse Oximetry Type Continuous   $ Pulse Oximetry - Multiple Charge Pulse Oximetry - Multiple   Pulse (!) 112   Resp (!) 47   BP (!) 143/71   Positioning   Head of Bed (HOB) HOB at 60 degrees   Labs   $ Was an ABG obtained? Arterial Puncture;ISTAT - Blood gas;ISTAT - Calcium;ISTAT - Createnine;ISTAT - Hematocrit;ISTAT - Potassium;ISTAT - Sodium   $ Labs Tech Time 15 min   Critical Value Communication   Date Result Received 03/01/20   Time Result Received 2014   Resulting Department of Critical Value resp   Who communicated critical value from resulting department? tlj   Critical Test #1 ph 7.563   Critical Test #1 Result pco2 28.4   Critical Test #2 50 po2   Name of Notified Physician/Designee dr REN   Date Notified 03/01/20   Time Notified 2020   Read Back Verification Yes   Physician Directive PUT ON BIPAP IF NEEDED   Respiratory Evaluation   $ Care Plan Tech Time 45 min   Evaluation For   (1945 TO 2030)

## 2020-03-02 NOTE — PROGRESS NOTES
"Progress Note  Infectious Disease    Reason for Consult:  Probable endocarditis    02/29/2020.  Consult note. Soraya Gee is a  appearing 34 y.o. female with past medical history of hepatitis-C, IV drug use, in present for 3 years, released in March 2019, that is when she resumed IV drugs.  She comes in complaining of shaking chills, fever, myalgia, malaise, generalized weakness, 3 days duration after injecting some "bad drugs".  Initially she thought she had cotton fever but the symptoms persisted so she came to ER.  Patient also complains of shortness of breath, she was tachycardic and tachypneic on admission.  She has noticed swelling throughout.  Her albumin is low  Patient is a IV drug abuse and uses heroin twice a day, for a total daily dose of 1 g.  No sick contacts.  She is admitted in ICU for closer observation to make sure she does not withdrawal.  Patient is tachycardic, tachypneic, hurting all over, her history is limited by although this    03/01/2020.  Withdrawal symptoms are improved with Precedex drip; although she still feels like she is breathing fast and she is hurting all over.  She spiked fever of 101.3.  Blood cultures came positive for GPC.  No urinary complaints, no new skin or joint complaint, no cough or phlegm.      03/02/2020.  She spiked another fever of 102.3.  All cultures are turning up to be MRSA  CRP improved from 20/5 down to 13.  WBC is still elevated.  She gets confused on and off but then she interacts appropriately.    Antibiotics (From admission, onward)    Start     Stop Route Frequency Ordered    03/02/20 1730  vancomycin (VANCOCIN) 1,500 mg in dextrose 5 % 500 mL IVPB  (vancomycin IVPB)      -- IV Every 24 hours (non-standard times) 03/02/20 0839    03/01/20 1300  cefazolin (ANCEF) 2 gram in dextrose 5% 50 mL IVPB (premix)      -- IV Every 8 hours (non-standard times) 03/01/20 1151    03/01/20 1230  mupirocin 2 % ointment  (MRSA Decolonization Orders [facility])      03/06 " 0859 Nasl 2 times daily 03/01/20 1126    02/29/20 1829  vancomycin - pharmacy to dose  (vancomycin IVPB)      -- IV pharmacy to manage frequency 02/29/20 1729        Antifungals (From admission, onward)    None        Antivirals (From admission, onward)    None            EXAM & DIAGNOSTICS REVIEWED:   Vitals:     Temp:  [97.7 °F (36.5 °C)-99.6 °F (37.6 °C)]   Temp: 98.2 °F (36.8 °C) (03/02/20 0730)  Pulse: 85 (03/02/20 0730)  Resp: (!) 35 (03/02/20 0730)  BP: (!) 95/52 (03/02/20 0701)  SpO2: 96 % (03/02/20 0730)    Intake/Output Summary (Last 24 hours) at 3/2/2020 0958  Last data filed at 3/2/2020 0505  Gross per 24 hour   Intake 2153.67 ml   Output 2065 ml   Net 88.67 ml     Vitals:    03/02/20 1530   BP:    Pulse:    Resp:    Temp: (!) 102.3 °F (39.1 °C)         General:  In NAD.  She sleeps most of the time as she is on mild sedation.  Comfortable.  When awake she breathes fast  Eyes:  Anicteric, PERRL, EOMI  ENT:  No ulcers, exudates, thrush, nares patent, edentulous.  Dry lips  Neck:  supple, no masses or adenopathy appreciated  Lungs: Tachypneic,faint crackles   Heart:  The not tachycardic at this time.  Abd:  Soft, ND, normal BS, she hurts all over, she has generalized discomfort in abdomen, slightly worse in epigastric and right upper right flank area.  :  Vasquez catheter, with dark urine that is clearing up no flank tenderness  Musc:  She hurts all over.  Skin:   scratches in upper and lower extremities.  Marks from prior IV use   Wound:   Neuro:  speech fluent, face symmetric, moves all extremities, no focal weakness.   Psych:  Slightly anxious, understandably;   Lymphatic:     No cervical, supraclavicular, axillary, or inguinal nodes  Extrem:   Positive for +3 edema, .  No erythema, phlebitis, cellulitis, warm and well perfused  VAD:       Isolation:  No    Lines/Tubes/Drains:  Peripheral IV    General Labs reviewed:  Recent Labs   Lab 02/29/20  1515  03/01/20  0521 03/01/20 2014 03/02/20  0442   WBC  11.34  --  15.64*  --  15.82*   HGB 11.9*  --  10.8*  --  10.3*   HCT 34.0*   < > 31.2* 38 29.9*   PLT 87*  --  81*  --  88*    < > = values in this interval not displayed.       Recent Labs   Lab 02/29/20  1515 03/01/20  0521 03/01/20  1144 03/01/20  2030 03/02/20  0442   *  132* 136 136 137 138   K 2.5*  2.5* 2.7* 3.4* 3.8 3.2*   CL 91*  91* 95 96 101 100   CO2 26  26 28 28 22* 30*   BUN 52*  52* 40* 39* 26* 25*   CREATININE 1.8*  1.8* 1.0 1.0 0.7 0.6   CALCIUM 8.5*  8.5* 8.2* 8.3* 8.7 8.5*   PROT 6.9  6.9 6.2  --   --  5.6*   BILITOT 6.2*  6.2* 6.9*  --   --  7.4*   ALKPHOS 164*  164* 137*  --   --  129   ALT 65*  65* 54*  --   --  43   AST 72*  72* 58*  --   --  46*       Lab Results   Component Value Date    CRP 14.94 (H) 03/02/2020             Micro:  Microbiology Results (last 7 days)     Procedure Component Value Units Date/Time    Urine culture [[id number]] Collected:  02/29/20 1522    Order Status:  Completed Specimen:  Urine Updated:  03/02/20 0652     Urine Culture, Routine No growth    Narrative:       Preferred Collection Type->Urine, Clean Catch  Specimen Source->Urine    Blood culture [[id number]]  (Abnormal) Collected:  02/29/20 1605    Order Status:  Completed Specimen:  Blood from Peripheral, Upper Arm, Right Updated:  03/02/20 0639     Blood Culture, Routine Gram stain aer bottle: Gram positive cocci      Gram stain porfirio bottle: Gram positive cocci      Positive results previously called      STAPHYLOCOCCUS AUREUS  For susceptibility see order #[id number]      Blood culture x two cultures. Draw prior to antibiotics. [[id number]]  (Abnormal) Collected:  02/29/20 1517    Order Status:  Completed Specimen:  Blood from Peripheral, Forearm, Right Updated:  03/02/20 0638     Blood Culture, Routine Gram stain aer bottle: Gram positive cocci      Gram stain porfirio bottle: Gram positive cocci      Results called to and read back by:Xenia Sargent [id number];  03/01/2020        02:44 CJD       STAPHYLOCOCCUS AUREUS  For susceptibility see order #[id number]      Narrative:       Aerobic and anaerobic    Blood culture x two cultures. Draw prior to antibiotics. [[id number]]  (Abnormal) Collected:  02/29/20 1542    Order Status:  Completed Specimen:  Blood from Peripheral, Forearm, Right Updated:  03/02/20 0636     Blood Culture, Routine Gram stain aer bottle: Gram positive cocci      Gram stain porfirio bottle: Gram positive cocci      Results called to and read back by:Xenia Sargent [email or web address];  03/01/2020        02:45 CJD      STAPHYLOCOCCUS AUREUS  Susceptibility pending      Narrative:       Aerobic and anaerobic    Blood culture [[id number]] Collected:  03/02/20 0441    Order Status:  Sent Specimen:  Blood from Antecubital, Right Updated:  03/02/20 0450    Blood culture [[id number]] Collected:  03/01/20 0521    Order Status:  Completed Specimen:  Blood Updated:  03/02/20 0201     Blood Culture, Routine Gram stain aer bottle: Gram positive cocci      Results called to and read back by:MARILYN [email or web address];        03/02/2020  02:01 CJD    Blood culture [[id number]]     Order Status:  Canceled Specimen:  Blood         Imaging Reviewed:  Chest x-ray 03/02/2020Improvement of the central hilar interstitial opacities with faint ground-glass opacities in lung bases suggestive of resolving edema    CXR Mild central hilar bronchovascular crowding, possibly secondary to low lung volumes and atelectasis or very mild edema, or atypical infection/bronchitis as above    US1.  Sludge seen in the gallbladder, without findings to specifically suggest acute cholecystitis (noting borderline gallbladder wall thickening).  2.  Rounded echogenic focus in the right lobe of the liver, with imaging features of an hepatic hemangioma, consider ultrasound follow-up in 6 months to document stability.  3.  Numerous small periportal lymph nodes, likely reactive/inflammatory in nature.  Consider correlation with liver function tests and  hepatitis serologies.    Cardiology:  Sinus tachycardia  ECHO is performed and pending  IMPRESSION & PLAN     Persistent MRSA bacteremia.  Probable endocarditis in a patient with history of IV drug use.    Elevated markers of inflammation including procal, CRP, ESR.  TELLY.  Improved.  Urine is looking lighter. Vasquez in place  Epigastric right upper quadrant discomfort and generalized abdominal discomfort.  I think that is due to opiate withdrawal.  Ultrasound of right upper quadrant as above.  Monitor.  Transaminitis, heavy opiate use, Ho Hep C  IVDU. heroin  Anemia  Patient was imprisoned is 3 years.  She was released in March 2019.   Protein malnutrition.  Hypoalbuminemia =  2.6  This patient is high risk for life-threatening deterioration and death secondary to above comorbidities and need for IV treatment.      Recommendations:  Continue vancomycin.  Discontinue cefazolin.  Repeat dapto x 1 today  Follow  daily blood cultures.    She hurts all over and she has a hard time turning side to side.  I did not find  any specific tenderness on her spine.  Will monitor carefully for possibility of diskitis.    I discussed with cardiologist yesterday.  Given that patient is on Diprivan drip and get sleepy and confused on and off cardiologist cannot get a proper consult for NAVA.  He will talk to patient again in the near future.    Check CT head.

## 2020-03-02 NOTE — PROGRESS NOTES
"   03/01/20 2001   Vital Signs   Pulse (!) 119   Resp (!) 61   SpO2 (!) 94 %   Flow (L/min) 4   O2 Device (Oxygen Therapy) nasal cannula w/ humidification   BP (!) 181/115   MAP (mmHg) 143     Asked pt when last time she used heroin and pt stated "today". This RN and Ofelia Miguel, RN present during this statement.   Pt received Haldol IM and IVP lorazepam d/t severe agitation, body shakes, diaphoresis, attempting to get OOB. Precedex gtt increased.   Bilateral wrist restraints applied. Dr. Hummel at bedside to see pt. No visitors allowed.   ABG done - BiPAP placed.   "

## 2020-03-02 NOTE — PLAN OF CARE
Problem: Oral Intake Inadequate  Goal: Improved Oral Intake  Outcome: Ongoing, Progressing  Intervention: Promote and Optimize Oral Intake  Flowsheets (Taken 3/2/2020 8485)  Oral Nutrition Promotion: calorie dense liquids provided

## 2020-03-02 NOTE — PROGRESS NOTES
"[facility]  Adult Nutrition   Progress Note (Initial Assessment)     SUMMARY     Recommendations  Recommendation/Intervention: 1. Recommend diet be advanced as medically able. 2. Added ensure clear TID (to provide 720 kcal/day and 24 g/day protein. 3. Monitor and encourage PO intake of meals and supplements.   Goals: 1. Diet to be advanced to regular diet as tolerated/as medically able. 2. Patient to meet at least 75% of estimated energy and protein needs via PO intake of meals and supplements.   Nutrition Goal Status: new  Communication of RD Recs: reviewed with RN    Dietitian Rounds Brief  Patient assessed 2' ICU status. Pt very sleepy at time of rounds. RD did not interview. PO intake fair/poor of CLs per RN. RD notified RN that ensure clear was added. Will continue to monitor PO intake and diet advancement.     Reason for Assessment  Reason For Assessment: other (see comments)(ICU status )  Relevant Medical History: Acute endocarditis, IVDU (intravenous drug user), Hepatitis C, TELLY (acute kidney injury)  Interdisciplinary Rounds: attended    Nutrition Risk Screen  Nutrition Risk Screen: no indicators present       Wound 03/01/20 0400 medial Buttocks-Wound Image: Images linked  MST Score: 0  Have you recently lost weight without trying?: No  Weight loss score: 0  Have you been eating poorly because of a decreased appetite?: No  Appetite score: 0       Nutrition/Diet History  Spiritual, Cultural Beliefs, Zoroastrianism Practices, Values that Affect Care: no  Food Allergies: NKFA  Factors Affecting Nutritional Intake: clear liquid diet    Anthropometrics  Temp: 99.8 °F (37.7 °C)  Height Method: Stated  Height: 5' 8" (172.7 cm)  Height (inches): 68 in  Weight Method: Bed Scale  Weight: 80.7 kg (178 lb)  Weight (lb): 178 lb  Ideal Body Weight (IBW), Female: 140 lb  % Ideal Body Weight, Female (lb): 133.07 %  BMI (Calculated): 27.1  BMI Grade: 25 - 29.9 - overweight       Weight History:  Wt Readings from " Last 10 Encounters:   03/02/20 80.7 kg (178 lb)   08/19/19 87.5 kg (193 lb)       Lab/Procedures/Meds: Pertinent Labs Reviewed  Clinical Chemistry:  Recent Labs   Lab 02/29/20  1515 02/29/20 1851 03/02/20 0442   *  132*  --    < > 138   K 2.5*  2.5*  --    < > 3.2*   CL 91*  91*  --    < > 100   CO2 26  26  --    < > 30*   GLU 88  88  --    < > 126*   BUN 52*  52*  --    < > 25*   CREATININE 1.8*  1.8*  --    < > 0.6   CALCIUM 8.5*  8.5*  --    < > 8.5*   PROT 6.9  6.9  --    < > 5.6*   ALBUMIN 2.9*  2.9*  --    < > 2.1*   BILITOT 6.2*  6.2*  --    < > 7.4*   ALKPHOS 164*  164*  --    < > 129   AST 72*  72*  --    < > 46*   ALT 65*  65*  --    < > 43   ANIONGAP 15  15  --    < > 8   ESTGFRAFRICA 42.0*  42.0*  --    < > >60.0   EGFRNONAA 36.5*  36.5*  --    < > >60.0   MG 1.7  --    < > 1.9   PHOS 3.6  --   --   --    AMYLASE  --  25  --   --    LIPASE  --  29  --   --     < > = values in this interval not displayed.     CBC:   Recent Labs   Lab 03/02/20 0442   WBC 15.82*   RBC 3.35*   HGB 10.3*   HCT 29.9*   PLT 88*   MCV 89   MCH 30.7   MCHC 34.4     Cardiac Profile:  Recent Labs   Lab 02/29/20  1515 02/29/20  1851   BNP 87  --    CPK 19*  --    CPKMB 1.5  --    TROPONINI 0.069* 0.156*     Inflammatory Labs:  Recent Labs   Lab 02/29/20  1515 03/02/20 0442   CRP 24.33* 14.94*     Medications: Pertinent Medications reviewed  Scheduled Meds:   ceFAZolin (ANCEF) IVPB  2 g Intravenous Q8H    chlorhexidine  15 mL Mouth/Throat BID    enoxaparin  40 mg Subcutaneous Daily    methadone  20 mg Oral Daily    mupirocin   Nasal BID    vancomycin (VANCOCIN) IVPB  20 mg/kg Intravenous Q24H     Continuous Infusions:   dexmedetomidine (PRECEDEX) infusion 0.2 mcg/kg/hr (03/02/20 0900)     PRN Meds:.acetaminophen, calcium chloride IVPB, calcium chloride IVPB, calcium chloride IVPB, HYDROmorphone, lorazepam, magnesium oxide, magnesium sulfate IVPB, magnesium sulfate IVPB, magnesium sulfate IVPB,  magnesium sulfate IVPB, ondansetron, potassium chloride in water, potassium chloride in water, potassium chloride in water, potassium chloride in water, potassium chloride, potassium chloride, potassium chloride, potassium chloride, [START ON 3/3/2020] DIPH,PERTUS (ADACEL),TETANUS PF VAC (ADULT), Pharmacy to dose Vancomycin consult **AND** vancomycin - pharmacy to dose    Estimated/Assessed Needs    Weight Used For Calorie Calculations: 80.7 kg (177 lb 14.6 oz)  Energy Calorie Requirements (kcal): 2018 - 2421 (25 - 30)   Energy Need Method: Kcal/kg  Protein Requirements: 97 - 121 (1.2 - 1.5)   Weight Used For Protein Calculations: 80.7 kg (177 lb 14.6 oz)     Estimated Fluid Requirement Method: RDA Method  RDA Method (mL): 2018       Nutrition Prescription Ordered  Current Diet Order: clear liquid     Evaluation of Received Nutrient/Fluid Intake  Energy Calories Required: not meeting needs  Protein Required: not meeting needs  Fluid Required: meeting needs   Tolerance: tolerating     Intake/Output Summary (Last 24 hours) at 3/2/2020 1419  Last data filed at 3/2/2020 1000  Gross per 24 hour   Intake 1035.27 ml   Output 2525 ml   Net -1489.73 ml      % Intake of Estimated Energy Needs: 0 - 25 %  % Meal Intake: 25 - 50 %    Nutrition Risk  Level of Risk/Frequency of Follow-up: high     Monitor and Evaluation  Food and Nutrient Intake: energy intake, food and beverage intake  Food and Nutrient Adminstration: diet order  Physical Activity and Function: nutrition-related ADLs and IADLs, factors affecting access to physical activity  Anthropometric Measurements: weight change, weight, body mass index  Biochemical Data, Medical Tests and Procedures: electrolyte and renal panel, glucose/endocrine profile, lipid profile, gastrointestinal profile, inflammatory profile  Nutrition-Focused Physical Findings: overall appearance     Nutrition Follow-Up  RD Follow-up?: Yes     Jessica Tyalor RD 03/02/2020 2:20 PM

## 2020-03-02 NOTE — NURSING
"Pt asleep on entry , but awakens easily.  MARCELINO.  Pt asks  " The night nurse said I will have pain meds available soon?"   Confirmed with MAR meds due/available, and informed pt.    Pt polite. Drank juice without difficulty.  Orientation facts given, pt acknowledges speaker.    Returned to sleep when unstimulated.      "

## 2020-03-02 NOTE — SUBJECTIVE & OBJECTIVE
Interval History:     Review of Systems  Objective:     Vital Signs (Most Recent):  Temp: (!) 102.3 °F (39.1 °C) (03/02/20 1530)  Pulse: 103 (03/02/20 1500)  Resp: (!) 68 (03/02/20 1500)  BP: 121/79 (03/02/20 1500)  SpO2: 96 % (03/02/20 1500) Vital Signs (24h Range):  Temp:  [97.7 °F (36.5 °C)-102.3 °F (39.1 °C)] 102.3 °F (39.1 °C)  Pulse:  [] 103  Resp:  [29-68] 68  SpO2:  [93 %-99 %] 96 %  BP: ()/() 121/79     Weight: 80.7 kg (178 lb)  Body mass index is 27.06 kg/m².    Intake/Output Summary (Last 24 hours) at 3/2/2020 1553  Last data filed at 3/2/2020 1500  Gross per 24 hour   Intake 1991.17 ml   Output 3175 ml   Net -1183.83 ml      Physical Exam   Constitutional: She is oriented to person, place, and time.   HENT:   Head: Normocephalic and atraumatic.   Eyes: Conjunctivae are normal.   Neck: No JVD present.   Cardiovascular: Normal heart sounds.   Pulmonary/Chest: Effort normal and breath sounds normal.   Abdominal: Soft. Bowel sounds are normal.   Neurological: She is alert and oriented to person, place, and time.   Skin: Skin is warm.   Nursing note and vitals reviewed.      Significant Labs:   BMP:   Recent Labs   Lab 03/02/20  0442   *      K 3.2*      CO2 30*   BUN 25*   CREATININE 0.6   CALCIUM 8.5*   MG 1.9     CBC:   Recent Labs   Lab 03/01/20  0521 03/01/20 2014 03/02/20  0442   WBC 15.64*  --  15.82*   HGB 10.8*  --  10.3*   HCT 31.2* 38 29.9*   PLT 81*  --  88*       Significant Imaging: I have reviewed all pertinent imaging results/findings within the past 24 hours.

## 2020-03-02 NOTE — PROGRESS NOTES
[facility]  Progress Note    Patient Name: Soraya Gee  MRN: [id number]  Patient Class: IP- Inpatient   Admission Date: 2/29/2020  Length of Stay: 2 days  Attending Physician: Og Thompson MD  Primary Care Provider: Jus Griggs Iii, MD        Subjective:     Principal Problem:Acute endocarditis        HPI:  34-year-old patient getting admitted with suspected sepsis from acute infective endocarditis  Patient is a IV drug abuse and uses heroin on a daily basis  For the past 2 days she has been having fever/myalgia/malaise and generalized weakness all over the body  Later she started having shortness of breath and patient did notice that her legs are getting more swollen  Because of the persistence of symptoms she came to the emergency room and got admitted  Patient initially thought she might have got cotton fever  Per patient if she will not take heroin every day she will develop severe withdrawal symptoms  No other issues.  She lives alone but has got a boyfriend who lives very nearby       Overview/Hospital Course:  Patient was admitted with most likely bacterial endocarditis  She has daily urine IV drug user.  Methadone started yesterday and increased the dose further  Still having persistent fevers, blood culture with persistent  MRSA  Seen and examined the patient she is awake alert oriented and able to answer all the questions, off Precedex  Patient is going to need NAVA    Interval History:     Review of Systems  Objective:     Vital Signs (Most Recent):  Temp: (!) 102.3 °F (39.1 °C) (03/02/20 1530)  Pulse: 103 (03/02/20 1500)  Resp: (!) 68 (03/02/20 1500)  BP: 121/79 (03/02/20 1500)  SpO2: 96 % (03/02/20 1500) Vital Signs (24h Range):  Temp:  [97.7 °F (36.5 °C)-102.3 °F (39.1 °C)] 102.3 °F (39.1 °C)  Pulse:  [] 103  Resp:  [29-68] 68  SpO2:  [93 %-99 %] 96 %  BP: ()/() 121/79     Weight: 80.7 kg (178 lb)  Body mass index is 27.06 kg/m².    Intake/Output  Summary (Last 24 hours) at 3/2/2020 1553  Last data filed at 3/2/2020 1500  Gross per 24 hour   Intake 1991.17 ml   Output 3175 ml   Net -1183.83 ml      Physical Exam   Constitutional: She is oriented to person, place, and time.   HENT:   Head: Normocephalic and atraumatic.   Eyes: Conjunctivae are normal.   Neck: No JVD present.   Cardiovascular: Normal heart sounds.   Pulmonary/Chest: Effort normal and breath sounds normal.   Abdominal: Soft. Bowel sounds are normal.   Neurological: She is alert and oriented to person, place, and time.   Skin: Skin is warm.   Nursing note and vitals reviewed.      Significant Labs:   BMP:   Recent Labs   Lab 03/02/20  0442   *      K 3.2*      CO2 30*   BUN 25*   CREATININE 0.6   CALCIUM 8.5*   MG 1.9     CBC:   Recent Labs   Lab 03/01/20  0521 03/01/20 2014 03/02/20  0442   WBC 15.64*  --  15.82*   HGB 10.8*  --  10.3*   HCT 31.2* 38 29.9*   PLT 81*  --  88*       Significant Imaging: I have reviewed all pertinent imaging results/findings within the past 24 hours.      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  Echo was reading as normal but as per cardiologist not able to see some area properly.      Plan   Continue IV antibiotics   she will need NAVA when she is more awake and alert . She is off precedix  Follow card and ID   PICC line placement       TELLY (acute kidney injury)    resolved      Hepatitis C  History of hepatitis C  Right now bilirubin level is on the higher range along with slight elevation of hepatic panels    Plan    ultrasound of the abdomen with hepatic hemangioma, ultrasound follow-up in 6 months   AFP ordered      IVDU (intravenous drug user)  Patient uses heroin on daily basis  Watch closely/monitor for withdrawal symptoms  Methadone started        VTE Risk Mitigation (From admission, onward)          Ordered     enoxaparin injection 40 mg  Daily      02/29/20 1725     IP VTE HIGH RISK PATIENT  Once      02/29/20 1725                      Og Thompson MD  Department of Hospital Medicine   Counts include 234 beds at the [facility]

## 2020-03-02 NOTE — PLAN OF CARE
Assessment done. Pt will have no cm needs at discharge. Cm to follow until dc from hospital.     03/02/20 1016   Discharge Assessment   Assessment Type Discharge Planning Assessment   Confirmed/corrected address and phone number on facesheet? Yes   Assessment information obtained from? Patient   Prior to hospitilization cognitive status: Unable to Assess   Prior to hospitalization functional status: Independent   Current cognitive status: Not Oriented to Place;Not Oriented to Time   Current Functional Status: Needs Assistance   Lives With alone   Able to Return to Prior Arrangements yes   Is patient able to care for self after discharge? Unable to determine at this time (comments)   Who are your caregiver(s) and their phone number(s)? Jeannine Zazueta  grandmother [phone]   Patient's perception of discharge disposition home or selfcare   Readmission Within the Last 30 Days no previous admission in last 30 days   Patient currently being followed by outpatient case management? No   Patient currently receives any other outside agency services? No   Equipment Currently Used at Home none   Part D Coverage Medicaid    Do you have any problems affording any of your prescribed medications? No   Is the patient taking medications as prescribed? yes   Does the patient have transportation home? Yes   Transportation Anticipated family or friend will provide;other (see comments)  (Probably her friend Gaudencio)   Does the patient receive services at the [facility]? No   Discharge Plan A Home   DME Needed Upon Discharge  none   Patient/Family in Agreement with Plan yes

## 2020-03-02 NOTE — PLAN OF CARE
03/01/20 2247   Patient Assessment/Suction   Level of Consciousness (AVPU) responds to voice   Respiratory Effort Mild;Labored   Expansion/Accessory Muscles/Retractions no use of accessory muscles   PRE-TX-O2   O2 Device (Oxygen Therapy) BiPAP   Oxygen Concentration (%) 35   SpO2 96 %   Pulse Oximetry Type Continuous   $ Pulse Oximetry - Multiple Charge Pulse Oximetry - Multiple   Pulse 83   Resp (!) 41   Ready to Wean/Extubation Screen   FIO2<=50 (chart decimal) 0.35   Preset CPAP/BiPAP Settings   Mode Of Delivery BiPAP S/T   $ Is patient using? Yes   Equipment Type V60   Ipap 16   EPAP (cm H2O) 8   Pressure Support (cm H2O) 8   ITime (sec) 0.8   Rise Time (sec) 3   Patient CPAP/BiPAP Settings   RR Total (Breaths/Min) 42   Tidal Volume (mL) 344   VE Minute Ventilation (L/min) 17.4 L/min   Peak Inspiratory Pressure (cm H2O) 17   TiTOT (%) 41   Total Leak (L/Min) 2   Patient Trigger - ST Mode Only (%) 100   CPAP/BiPAP Alarms   High Pressure (cm H2O) 40   Low Pressure (cm H2O) 10   Minute Ventilation (L/Min) 3   High RR (breaths/min) 60   Low RR (breaths/min) 10   Apnea (Sec) 20   maintain adequate oxygenation/ventilation

## 2020-03-02 NOTE — ASSESSMENT & PLAN NOTE
Patient uses heroin on daily basis  Watch closely/monitor for withdrawal symptoms  Methadone started

## 2020-03-02 NOTE — ASSESSMENT & PLAN NOTE
History of hepatitis C  Right now bilirubin level is on the higher range along with slight elevation of hepatic panels    Plan    ultrasound of the abdomen with hepatic hemangioma, ultrasound follow-up in 6 months   AFP ordered

## 2020-03-02 NOTE — ASSESSMENT & PLAN NOTE
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  Echo was reading as normal but as per cardiologist not able to see some area properly.      Plan   Continue IV antibiotics   she will need NAVA when she is more awake and alert . She is off precedix  Follow card and ID   PICC line placement

## 2020-03-02 NOTE — PLAN OF CARE
Problem: Adult Inpatient Plan of Care  Goal: Plan of Care Review  Outcome: Ongoing, Progressing  Goal: Patient-Specific Goal (Individualization)  Outcome: Ongoing, Progressing  Goal: Absence of Hospital-Acquired Illness or Injury  Outcome: Ongoing, Progressing  Goal: Optimal Comfort and Wellbeing  Outcome: Ongoing, Progressing  Goal: Readiness for Transition of Care  Outcome: Ongoing, Progressing  Goal: Rounds/Family Conference  Outcome: Ongoing, Progressing     Problem: Fall Injury Risk  Goal: Absence of Fall and Fall-Related Injury  Outcome: Ongoing, Progressing     Problem: Skin Injury Risk Increased  Goal: Skin Health and Integrity  Outcome: Ongoing, Progressing     Problem: Infection  Goal: Infection Symptom Resolution  Outcome: Ongoing, Progressing     Problem: Restraint, Nonbehavioral (Nonviolent)  Goal: Discontinuation Criteria Achieved  Outcome: Ongoing, Progressing  Goal: Personal Dignity and Safety Maintained  Outcome: Ongoing, Progressing

## 2020-03-02 NOTE — PHYSICIAN QUERY
PT Name: Soraya Gee  MR #: [id number]     Physician Query Form - Diagnosis Clarification      CDS/: Paula Saini RN, CCDS               Contact information:  [phone]    This form is a permanent document in the medical record.     Query Date: March 2, 2020    By submitting this query, we are merely seeking further clarification of documentation.  Please utilize your independent clinical judgment when addressing the question(s) below.     The medical record contains the following:      Findings Supporting Clinical Information Location in Medical Record                       Protein malnutrition She appears well-developed and well-nourished.     Acute endocarditis  TELLY (acute kidney injury)  IVDU (intravenous drug user)  Patient uses heroin on daily basis    Musc:   Joints without effusion, swelling, erythema, synovitis, muscle wasting.     Protein malnutrition.  Hypoalbuminemia =  2.6    Body mass index is 27.31    Ensure clear 02/29/2020 ED provider note    02/29/2020 H & P          03/01/2020 Infectious Disease consult      03/01/2020 Infectious Disease consult    03/01/2020 Hospital Medicine note    03/02/2020 Other orders     Please clarify if the protein malnutrition diagnosis has been:    [  ] Ruled In   [  ] Ruled In, Now Resolved   [  ] Ruled Out   [  ] Other/Clarification of findings (please specify):   [x  ] Clinically insignificant     [  ] Clinically undetermined     Please document in your progress notes daily for the duration of treatment, until resolved, and include in your discharge summary.

## 2020-03-03 LAB
AFP-TM SERPL-MCNC: 0.8 NG/ML (ref 0–8.3)
ALBUMIN SERPL BCP-MCNC: 2.1 G/DL (ref 3.5–5.2)
ALP SERPL-CCNC: 152 U/L (ref 55–135)
ALT SERPL W/O P-5'-P-CCNC: 41 U/L (ref 10–44)
AMMONIA PLAS-SCNC: 36 UMOL/L (ref 10–50)
ANION GAP SERPL CALC-SCNC: 5 MMOL/L (ref 8–16)
ANISOCYTOSIS BLD QL SMEAR: SLIGHT
AST SERPL-CCNC: 48 U/L (ref 10–40)
BACTERIA BLD CULT: ABNORMAL
BASOPHILS NFR BLD: 0 % (ref 0–1.9)
BILIRUB DIRECT SERPL-MCNC: 3.4 MG/DL (ref 0.1–0.3)
BILIRUB SERPL-MCNC: 6.2 MG/DL (ref 0.1–1)
BUN SERPL-MCNC: 16 MG/DL (ref 6–20)
CALCIUM SERPL-MCNC: 8.1 MG/DL (ref 8.7–10.5)
CHLORIDE SERPL-SCNC: 103 MMOL/L (ref 95–110)
CO2 SERPL-SCNC: 29 MMOL/L (ref 23–29)
CREAT SERPL-MCNC: 0.4 MG/DL (ref 0.5–1.4)
DIFFERENTIAL METHOD: ABNORMAL
EOSINOPHIL NFR BLD: 3 % (ref 0–8)
ERYTHROCYTE [DISTWIDTH] IN BLOOD BY AUTOMATED COUNT: 14.6 % (ref 11.5–14.5)
EST. GFR  (AFRICAN AMERICAN): >60 ML/MIN/1.73 M^2
EST. GFR  (NON AFRICAN AMERICAN): >60 ML/MIN/1.73 M^2
GLUCOSE SERPL-MCNC: 97 MG/DL (ref 70–110)
HAV IGM SERPL QL IA: NEGATIVE
HBV CORE IGM SERPL QL IA: NEGATIVE
HBV SURFACE AG SERPL QL IA: NEGATIVE
HCT VFR BLD AUTO: 32.6 % (ref 37–48.5)
HCV AB S/CO SERPL IA: >11 S/CO RATIO (ref 0–0.9)
HGB BLD-MCNC: 11.1 G/DL (ref 12–16)
IMM GRANULOCYTES # BLD AUTO: ABNORMAL K/UL (ref 0–0.04)
IMM GRANULOCYTES NFR BLD AUTO: ABNORMAL % (ref 0–0.5)
LYMPHOCYTES NFR BLD: 28 % (ref 18–48)
MAGNESIUM SERPL-MCNC: 1.5 MG/DL (ref 1.6–2.6)
MCH RBC QN AUTO: 30.4 PG (ref 27–31)
MCHC RBC AUTO-ENTMCNC: 34 G/DL (ref 32–36)
MCV RBC AUTO: 89 FL (ref 82–98)
MONOCYTES NFR BLD: 5 % (ref 4–15)
NEUTROPHILS NFR BLD: 57 % (ref 38–73)
NEUTS BAND NFR BLD MANUAL: 7 %
NRBC BLD-RTO: 0 /100 WBC
PLATELET # BLD AUTO: 144 K/UL (ref 150–350)
PMV BLD AUTO: 11.5 FL (ref 9.2–12.9)
POTASSIUM SERPL-SCNC: 3.9 MMOL/L (ref 3.5–5.1)
PROT SERPL-MCNC: 6.3 G/DL (ref 6–8.4)
RBC # BLD AUTO: 3.65 M/UL (ref 4–5.4)
SODIUM SERPL-SCNC: 137 MMOL/L (ref 136–145)
WBC # BLD AUTO: 16.22 K/UL (ref 3.9–12.7)

## 2020-03-03 PROCEDURE — 83735 ASSAY OF MAGNESIUM: CPT

## 2020-03-03 PROCEDURE — 20000000 HC ICU ROOM

## 2020-03-03 PROCEDURE — 12000002 HC ACUTE/MED SURGE SEMI-PRIVATE ROOM

## 2020-03-03 PROCEDURE — 82248 BILIRUBIN DIRECT: CPT

## 2020-03-03 PROCEDURE — 63600175 PHARM REV CODE 636 W HCPCS: Mod: JG | Performed by: INTERNAL MEDICINE

## 2020-03-03 PROCEDURE — 87040 BLOOD CULTURE FOR BACTERIA: CPT

## 2020-03-03 PROCEDURE — 82140 ASSAY OF AMMONIA: CPT

## 2020-03-03 PROCEDURE — 25000003 PHARM REV CODE 250: Performed by: INTERNAL MEDICINE

## 2020-03-03 PROCEDURE — 85007 BL SMEAR W/DIFF WBC COUNT: CPT

## 2020-03-03 PROCEDURE — 25000003 PHARM REV CODE 250

## 2020-03-03 PROCEDURE — 80053 COMPREHEN METABOLIC PANEL: CPT

## 2020-03-03 PROCEDURE — 25500020 PHARM REV CODE 255: Performed by: INTERNAL MEDICINE

## 2020-03-03 PROCEDURE — 85027 COMPLETE CBC AUTOMATED: CPT

## 2020-03-03 PROCEDURE — 36415 COLL VENOUS BLD VENIPUNCTURE: CPT

## 2020-03-03 PROCEDURE — 99900035 HC TECH TIME PER 15 MIN (STAT)

## 2020-03-03 PROCEDURE — 63600175 PHARM REV CODE 636 W HCPCS: Performed by: INTERNAL MEDICINE

## 2020-03-03 RX ADMIN — ACETAMINOPHEN 650 MG: 325 TABLET ORAL at 05:03

## 2020-03-03 RX ADMIN — POTASSIUM CHLORIDE 20 MEQ: 20 TABLET, EXTENDED RELEASE ORAL at 05:03

## 2020-03-03 RX ADMIN — DAPTOMYCIN 645 MG: 500 INJECTION, POWDER, LYOPHILIZED, FOR SOLUTION INTRAVENOUS at 12:03

## 2020-03-03 RX ADMIN — DEXMEDETOMIDINE HYDROCHLORIDE 0.8 MCG/KG/HR: 400 INJECTION INTRAVENOUS at 10:03

## 2020-03-03 RX ADMIN — DEXMEDETOMIDINE HYDROCHLORIDE 0.8 MCG/KG/HR: 400 INJECTION INTRAVENOUS at 05:03

## 2020-03-03 RX ADMIN — MUPIROCIN: 20 OINTMENT TOPICAL at 08:03

## 2020-03-03 RX ADMIN — IOHEXOL 100 ML: 350 INJECTION, SOLUTION INTRAVENOUS at 11:03

## 2020-03-03 RX ADMIN — CEFTAROLINE FOSAMIL 600 MG: 600 POWDER, FOR SOLUTION INTRAVENOUS at 12:03

## 2020-03-03 RX ADMIN — DEXMEDETOMIDINE HYDROCHLORIDE 0.2 MCG/KG/HR: 400 INJECTION INTRAVENOUS at 12:03

## 2020-03-03 RX ADMIN — DEXMEDETOMIDINE HYDROCHLORIDE 0.2 MCG/KG/HR: 400 INJECTION INTRAVENOUS at 05:03

## 2020-03-03 RX ADMIN — CHLORHEXIDINE GLUCONATE 15 ML: 1.2 RINSE ORAL at 08:03

## 2020-03-03 RX ADMIN — ACETAMINOPHEN 650 MG: 325 TABLET ORAL at 03:03

## 2020-03-03 RX ADMIN — METHADONE HYDROCHLORIDE 30 MG: 10 TABLET ORAL at 08:03

## 2020-03-03 RX ADMIN — ENOXAPARIN SODIUM 40 MG: 100 INJECTION SUBCUTANEOUS at 04:03

## 2020-03-03 RX ADMIN — CEFTAROLINE FOSAMIL 600 MG: 600 POWDER, FOR SOLUTION INTRAVENOUS at 08:03

## 2020-03-03 RX ADMIN — MAGNESIUM OXIDE 800 MG: 400 TABLET ORAL at 06:03

## 2020-03-03 NOTE — PLAN OF CARE
Patient free of falls/traumas/injuries. Precedex gtt infusing. Patient OOBTC today. CT obtained, IV ABXs continued. 5L O2 via NC. Skin clean, dry, and intact. Patient educated on plan of care and verbalized understanding. Patients VS stable and no distress. Will continue to monitor.

## 2020-03-03 NOTE — NURSING
Patient transported to CT with RN x2. Pt connected to portable telemetry monitor. VS stable, Gtts infusing per flowsheet. Pt returned to room, connected to ICU monitor, VS remained stable throughout procedure, Bed low and locked, call light within reach, Will continue to monitor closely.

## 2020-03-03 NOTE — SUBJECTIVE & OBJECTIVE
Interval History: Case discussed with Dr Connell; her imput is greatly appreciated    Review of Systems   Unable to perform ROS: Other (sedated)     Objective:     Vital Signs (Most Recent):  Temp: 100.2 °F (37.9 °C) (03/03/20 1105)  Pulse: 84 (03/03/20 1130)  Resp: (!) 45 (03/03/20 1130)  BP: (!) 89/49 (03/03/20 1105)  SpO2: (!) 94 % (03/03/20 1130) Vital Signs (24h Range):  Temp:  [99.5 °F (37.5 °C)-103 °F (39.4 °C)] 100.2 °F (37.9 °C)  Pulse:  [] 84  Resp:  [30-78] 45  SpO2:  [92 %-99 %] 94 %  BP: ()/(46-95) 89/49     Weight: 80.7 kg (178 lb)  Body mass index is 27.06 kg/m².    Intake/Output Summary (Last 24 hours) at 3/3/2020 1211  Last data filed at 3/3/2020 1100  Gross per 24 hour   Intake 2431.83 ml   Output 2800 ml   Net -368.17 ml      Physical Exam   Constitutional: No distress.   HENT:   Head: Normocephalic and atraumatic.   Eyes: Pupils are equal, round, and reactive to light. EOM are normal.   Neck: Normal range of motion. Neck supple.   Cardiovascular: Normal rate.   Murmur heard.  Pulmonary/Chest: Effort normal. She has rales.   Poor cooperation   Abdominal: Soft. Bowel sounds are normal.   Skin: She is not diaphoretic.       Significant Labs:   BMP:   Recent Labs   Lab 03/03/20 0452   GLU 97      K 3.9      CO2 29   BUN 16   CREATININE 0.4*   CALCIUM 8.1*   MG 1.5*     CBC:   Recent Labs   Lab 03/01/20 2014 03/02/20 0442 03/03/20 0452   WBC  --  15.82* 16.22*   HGB  --  10.3* 11.1*   HCT 38 29.9* 32.6*   PLT  --  88* 144*     CMP:   Recent Labs   Lab 03/01/20  2030 03/02/20  0442 03/03/20  0452    138 137   K 3.8 3.2* 3.9    100 103   CO2 22* 30* 29   GLU 88 126* 97   BUN 26* 25* 16   CREATININE 0.7 0.6 0.4*   CALCIUM 8.7 8.5* 8.1*   PROT  --  5.6* 6.3   ALBUMIN  --  2.1* 2.1*   BILITOT  --  7.4* 6.2*   ALKPHOS  --  129 152*   AST  --  46* 48*   ALT  --  43 41   ANIONGAP 14 8 5*   EGFRNONAA >60.0 >60.0 >60.0     Cardiac Markers: No results for input(s): CKMB,  MYOGLOBIN, BNP, TROPISTAT in the last 48 hours.  Troponin: No results for input(s): TROPONINI in the last 48 hours.    Significant Imaging: I have reviewed all pertinent imaging results/findings within the past 24 hours.

## 2020-03-03 NOTE — PLAN OF CARE
Problem: Oral Intake Inadequate  Goal: Improved Oral Intake  Outcome: Ongoing, Progressing  Intervention: Promote and Optimize Oral Intake  Flowsheets (Taken 3/3/2020 1618)  Oral Nutrition Promotion: calorie dense liquids provided     Diet advanced to regular. Changed ONS from Ensure Clear to Ensure Enlive TID (to provide 1050 kcal/day and 60 g/day protein). Monitor and encourage PO intake of meals and supplements.     Jessica Taylor RD 03/03/2020 4:19 PM

## 2020-03-03 NOTE — PROGRESS NOTES
Therapy with Vancomycin complete and / or consult / order discontinued by Dr. Rodriguez on 03/03/2020 @ 08:47   Pharmacy will sign off, please re-consult as needed.  Thank you for allowing us to participate in this patient's care.  Cinthia Billingsley 3/3/2020 8:57 AM  Dept of Pharmacy  Ext [phone]

## 2020-03-04 LAB
ALBUMIN SERPL BCP-MCNC: 1.9 G/DL (ref 3.5–5.2)
ALP SERPL-CCNC: 130 U/L (ref 55–135)
ALT SERPL W/O P-5'-P-CCNC: 32 U/L (ref 10–44)
ANION GAP SERPL CALC-SCNC: 8 MMOL/L (ref 8–16)
ANISOCYTOSIS BLD QL SMEAR: SLIGHT
AST SERPL-CCNC: 30 U/L (ref 10–40)
BACTERIA BLD CULT: ABNORMAL
BASOPHILS NFR BLD: 0 % (ref 0–1.9)
BILIRUB SERPL-MCNC: 3.9 MG/DL (ref 0.1–1)
BUN SERPL-MCNC: 19 MG/DL (ref 6–20)
CALCIUM SERPL-MCNC: 7.9 MG/DL (ref 8.7–10.5)
CHLORIDE SERPL-SCNC: 103 MMOL/L (ref 95–110)
CO2 SERPL-SCNC: 22 MMOL/L (ref 23–29)
CREAT SERPL-MCNC: 0.6 MG/DL (ref 0.5–1.4)
DIFFERENTIAL METHOD: ABNORMAL
EOSINOPHIL NFR BLD: 2 % (ref 0–8)
ERYTHROCYTE [DISTWIDTH] IN BLOOD BY AUTOMATED COUNT: 14.7 % (ref 11.5–14.5)
EST. GFR  (AFRICAN AMERICAN): >60 ML/MIN/1.73 M^2
EST. GFR  (NON AFRICAN AMERICAN): >60 ML/MIN/1.73 M^2
GLUCOSE SERPL-MCNC: 101 MG/DL (ref 70–110)
HCT VFR BLD AUTO: 28.2 % (ref 37–48.5)
HGB BLD-MCNC: 9.3 G/DL (ref 12–16)
IMM GRANULOCYTES # BLD AUTO: ABNORMAL K/UL (ref 0–0.04)
IMM GRANULOCYTES NFR BLD AUTO: ABNORMAL % (ref 0–0.5)
LYMPHOCYTES NFR BLD: 20 % (ref 18–48)
MAGNESIUM SERPL-MCNC: 1.6 MG/DL (ref 1.6–2.6)
MCH RBC QN AUTO: 30.4 PG (ref 27–31)
MCHC RBC AUTO-ENTMCNC: 33 G/DL (ref 32–36)
MCV RBC AUTO: 92 FL (ref 82–98)
MONOCYTES NFR BLD: 0 % (ref 4–15)
NEUTROPHILS NFR BLD: 64 % (ref 38–73)
NEUTS BAND NFR BLD MANUAL: 14 %
NRBC BLD-RTO: 0 /100 WBC
PLATELET # BLD AUTO: 137 K/UL (ref 150–350)
PMV BLD AUTO: 11.6 FL (ref 9.2–12.9)
POTASSIUM SERPL-SCNC: 3.7 MMOL/L (ref 3.5–5.1)
PROT SERPL-MCNC: 6.4 G/DL (ref 6–8.4)
RBC # BLD AUTO: 3.06 M/UL (ref 4–5.4)
SODIUM SERPL-SCNC: 133 MMOL/L (ref 136–145)
WBC # BLD AUTO: 16.94 K/UL (ref 3.9–12.7)

## 2020-03-04 PROCEDURE — 94761 N-INVAS EAR/PLS OXIMETRY MLT: CPT

## 2020-03-04 PROCEDURE — 25000003 PHARM REV CODE 250: Performed by: INTERNAL MEDICINE

## 2020-03-04 PROCEDURE — 87040 BLOOD CULTURE FOR BACTERIA: CPT

## 2020-03-04 PROCEDURE — 63600175 PHARM REV CODE 636 W HCPCS: Mod: JG | Performed by: INTERNAL MEDICINE

## 2020-03-04 PROCEDURE — 85007 BL SMEAR W/DIFF WBC COUNT: CPT

## 2020-03-04 PROCEDURE — 20000000 HC ICU ROOM

## 2020-03-04 PROCEDURE — 80053 COMPREHEN METABOLIC PANEL: CPT

## 2020-03-04 PROCEDURE — 25000003 PHARM REV CODE 250

## 2020-03-04 PROCEDURE — 83735 ASSAY OF MAGNESIUM: CPT

## 2020-03-04 PROCEDURE — 63600175 PHARM REV CODE 636 W HCPCS: Performed by: INTERNAL MEDICINE

## 2020-03-04 PROCEDURE — 85027 COMPLETE CBC AUTOMATED: CPT

## 2020-03-04 PROCEDURE — 12000002 HC ACUTE/MED SURGE SEMI-PRIVATE ROOM

## 2020-03-04 PROCEDURE — 36415 COLL VENOUS BLD VENIPUNCTURE: CPT

## 2020-03-04 PROCEDURE — 27000221 HC OXYGEN, UP TO 24 HOURS

## 2020-03-04 PROCEDURE — 99900035 HC TECH TIME PER 15 MIN (STAT)

## 2020-03-04 RX ORDER — OXYCODONE HYDROCHLORIDE 5 MG/1
5 TABLET ORAL EVERY 6 HOURS PRN
Status: DISCONTINUED | OUTPATIENT
Start: 2020-03-04 | End: 2020-03-18 | Stop reason: HOSPADM

## 2020-03-04 RX ADMIN — CHLORHEXIDINE GLUCONATE 15 ML: 1.2 RINSE ORAL at 08:03

## 2020-03-04 RX ADMIN — ACETAMINOPHEN 650 MG: 325 TABLET ORAL at 08:03

## 2020-03-04 RX ADMIN — DEXMEDETOMIDINE HYDROCHLORIDE 0.2 MCG/KG/HR: 400 INJECTION INTRAVENOUS at 12:03

## 2020-03-04 RX ADMIN — CEFTAROLINE FOSAMIL 600 MG: 600 POWDER, FOR SOLUTION INTRAVENOUS at 04:03

## 2020-03-04 RX ADMIN — MUPIROCIN: 20 OINTMENT TOPICAL at 08:03

## 2020-03-04 RX ADMIN — ENOXAPARIN SODIUM 40 MG: 100 INJECTION SUBCUTANEOUS at 04:03

## 2020-03-04 RX ADMIN — CEFTAROLINE FOSAMIL 600 MG: 600 POWDER, FOR SOLUTION INTRAVENOUS at 08:03

## 2020-03-04 RX ADMIN — CEFTAROLINE FOSAMIL 600 MG: 600 POWDER, FOR SOLUTION INTRAVENOUS at 11:03

## 2020-03-04 RX ADMIN — DEXMEDETOMIDINE HYDROCHLORIDE 0.2 MCG/KG/HR: 400 INJECTION INTRAVENOUS at 04:03

## 2020-03-04 RX ADMIN — POTASSIUM CHLORIDE 20 MEQ: 20 TABLET, EXTENDED RELEASE ORAL at 05:03

## 2020-03-04 RX ADMIN — OXYCODONE HYDROCHLORIDE 5 MG: 5 TABLET ORAL at 03:03

## 2020-03-04 RX ADMIN — ACETAMINOPHEN 650 MG: 325 TABLET ORAL at 12:03

## 2020-03-04 RX ADMIN — METHADONE HYDROCHLORIDE 30 MG: 10 TABLET ORAL at 08:03

## 2020-03-04 RX ADMIN — OXYCODONE HYDROCHLORIDE 5 MG: 5 TABLET ORAL at 10:03

## 2020-03-04 RX ADMIN — DAPTOMYCIN 645 MG: 500 INJECTION, POWDER, LYOPHILIZED, FOR SOLUTION INTRAVENOUS at 11:03

## 2020-03-04 RX ADMIN — MAGNESIUM OXIDE 800 MG: 400 TABLET ORAL at 05:03

## 2020-03-04 NOTE — PLAN OF CARE
Patient free of falls/traumas/injuries. Precedex gtt stopped today. Patient OOBTC today. IV ABXs continued. Patient on room air today. Skin clean, dry, and intact. Patient educated on plan of care and verbalized understanding. Patients VS stable and no distress. Will continue to monitor.

## 2020-03-04 NOTE — PROGRESS NOTES
"Progress Note  Infectious Disease    Reason for Consult:  Probable endocarditis    02/29/2020.  Consult note. Soraya Gee is a  appearing 34 y.o. female with past medical history of hepatitis-C, IV drug use, in present for 3 years, released in March 2019, that is when she resumed IV drugs.  She comes in complaining of shaking chills, fever, myalgia, malaise, generalized weakness, 3 days duration after injecting some "bad drugs".  Initially she thought she had cotton fever but the symptoms persisted so she came to ER.  Patient also complains of shortness of breath, she was tachycardic and tachypneic on admission.  She has noticed swelling throughout.  Her albumin is low  Patient is a IV drug abuse and uses heroin twice a day, for a total daily dose of 1 g.  No sick contacts.  She is admitted in ICU for closer observation to make sure she does not withdrawal.  Patient is tachycardic, tachypneic, hurting all over, her history is limited by although this    03/01/2020.  Withdrawal symptoms are improved with Precedex drip; although she still feels like she is breathing fast and she is hurting all over.  She spiked fever of 101.3.  Blood cultures came positive for GPC.  No urinary complaints, no new skin or joint complaint, no cough or phlegm.  03/02/2020.  She spiked another fever of 102.3.  All cultures are turning up to be MRSA  CRP improved from 20/5 down to 13.  WBC is still elevated.  She gets confused on and off but then she interacts appropriately.    3/3:  Chart reviewed and discussed with Dr. Mccormack.  Persistently positive blood cultures with MRSA, vancomycin EDDIE 1.  Persistently hectic fever and septic appearance, improve hyperbilirubinemia, right upper quadrant tenderness, generalized aches and pains, sedated a bit on Precedex and methadone, neurologically intact.  3/4: still febrile through late last night. 3/2 cultures negative, but 3/3 cultures have GPC. CT chest/abd/pelvis reviewed and septic pulmonary " emboli are present, but no spleen, muscle or paraspinous lesions noted.  She is currently up in the chair, off Precedex.  We discussed her diagnosis and she is willing to receive the treatment.  Does not have much appetite but she will drink the in [place].  She still hurts everywhere and does have some pleurisy.    Antibiotics (From admission, onward)    Start     Stop Route Frequency Ordered    03/03/20 1000  DAPTOmycin (CUBICIN) 645 mg in sodium chloride 0.9% IVPB      -- IV Every 24 hours (non-standard times) 03/03/20 0847    03/03/20 1000  ceftaroline (TEFLARO) 600 mg in dextrose 5 % 50 mL IVPB (ready to mix system)      -- IV Every 8 hours (non-standard times) 03/03/20 0847    03/01/20 1230  mupirocin 2 % ointment  (MRSA Decolonization Orders [facility])      03/06 0859 Nasl 2 times daily 03/01/20 1126        Antifungals (From admission, onward)    None        Antivirals (From admission, onward)    None            EXAM & DIAGNOSTICS REVIEWED:   Vitals:     Temp:  [98.5 °F (36.9 °C)-102.1 °F (38.9 °C)]   Temp: 98.7 °F (37.1 °C) (03/04/20 0702)  Pulse: 72 (03/04/20 0730)  Resp: (!) 32 (03/04/20 0730)  BP: (!) 93/54 (03/04/20 0702)  SpO2: 96 % (03/04/20 0730)    Intake/Output Summary (Last 24 hours) at 3/4/2020 0813  Last data filed at 3/4/2020 0700  Gross per 24 hour   Intake 2116.86 ml   Output 2495 ml   Net -378.14 ml     Vitals:    03/04/20 0730   BP:    Pulse: 72   Resp: (!) 32   Temp:          General:  Alert cooperative, tachypneic, uncomfortable, up in the chair  Eyes:  icteric, PERRL, EOMI, no scleral conjunctival lesions  ENT:  No ulcers, exudates, thrush, nares patent, edentulous.     Neck:  supple, no masses or adenopathy appreciated  Lungs: Tachypneic,faint crackles, no foci of consolidation but her effort is still very poor   Heart:  Regular, tachycardic common soft 1/6 systolic murmur  Abd:  Soft, ND, normal BS, tenderness in the right upper quadrant, over the liver.  :  Vasquez catheter, with dark orange  urine    Musc:  No focal joint swelling, synovitis, myositis, moves all extremities  Skin:    Marks from prior IV use.  Palms and soles are erythematous, without septic emboli, no subungual petechia   Wound:   Neuro:  speech fluent, but slow due to sedation, face symmetric, moves all extremities, no focal weakness.   Psych:  Alert, able to communicate normally, cooperative   Lymphatic:     No cervical, supraclavicular, axillary, or inguinal nodes  Extrem:  No palpable edema but hands and feet appear edematous  No  , phlebitis, cellulitis, warm and well perfused  VAD:   Peripherals   Isolation:  Contact    Lines/Tubes/Drains:  Peripheral IV    General Labs reviewed:  Recent Labs   Lab 03/02/20 0442 03/03/20 0452 03/04/20 0358   WBC 15.82* 16.22* 16.94*   HGB 10.3* 11.1* 9.3*   HCT 29.9* 32.6* 28.2*   PLT 88* 144* 137*       Recent Labs   Lab 03/02/20 0442 03/03/20 0452 03/04/20 0358    137 133*   K 3.2* 3.9 3.7    103 103   CO2 30* 29 22*   BUN 25* 16 19   CREATININE 0.6 0.4* 0.6   CALCIUM 8.5* 8.1* 7.9*   PROT 5.6* 6.3 6.4   BILITOT 7.4* 6.2* 3.9*   ALKPHOS 129 152* 130   ALT 43 41 32   AST 46* 48* 30       Recent Labs   Lab 02/29/20  1515 03/02/20 0442   CRP 24.33* 14.94*             Micro:  Microbiology Results (last 7 days)     Procedure Component Value Units Date/Time    Blood culture [[id number]] Collected:  03/02/20 0441    Order Status:  Completed Specimen:  Blood from Antecubital, Right Updated:  03/04/20 0632     Blood Culture, Routine No Growth to date      No Growth to date      No Growth to date    Blood culture [[id number]] Collected:  03/04/20 0358    Order Status:  Sent Specimen:  Blood Updated:  03/04/20 0415    Blood culture [[id number]] Collected:  03/03/20 0502    Order Status:  Completed Specimen:  Blood Updated:  03/04/20 0207     Blood Culture, Routine Gram stain aer bottle: Gram positive cocci       Results called to and read back by: Israel Ledesma RN MICU.    Blood culture  [[id number]]  (Abnormal) Collected:  02/29/20 1605    Order Status:  Completed Specimen:  Blood from Peripheral, Upper Arm, Right Updated:  03/03/20 0617     Blood Culture, Routine Gram stain aer bottle: Gram positive cocci      Gram stain porfirio bottle: Gram positive cocci      Positive results previously called      METHICILLIN RESISTANT STAPHYLOCOCCUS AUREUS  For susceptibility see order #[id number]      Blood culture x two cultures. Draw prior to antibiotics. [[id number]]  (Abnormal) Collected:  02/29/20 1517    Order Status:  Completed Specimen:  Blood from Peripheral, Forearm, Right Updated:  03/03/20 0616     Blood Culture, Routine Gram stain aer bottle: Gram positive cocci      Gram stain porfirio bottle: Gram positive cocci      Results called to and read back by:KAELA Guidry;  03/01/2020        02:44 CJD      METHICILLIN RESISTANT STAPHYLOCOCCUS AUREUS  For susceptibility see order #[id number]      Narrative:       Aerobic and anaerobic    Blood culture [[id number]]  (Abnormal) Collected:  03/01/20 0521    Order Status:  Completed Specimen:  Blood Updated:  03/03/20 0616     Blood Culture, Routine Gram stain aer bottle: Gram positive cocci      Results called to and read back by:KAELA Rios;        03/02/2020  02:01 CJD      METHICILLIN RESISTANT STAPHYLOCOCCUS AUREUS  For susceptibility see order #[id number]      Blood culture x two cultures. Draw prior to antibiotics. [[id number]]  (Abnormal)  (Susceptibility) Collected:  02/29/20 1542    Order Status:  Completed Specimen:  Blood from Peripheral, Forearm, Right Updated:  03/03/20 0614     Blood Culture, Routine Gram stain aer bottle: Gram positive cocci      Gram stain porfirio bottle: Gram positive cocci      Results called to and read back by:KAELA Guidry;  03/01/2020        02:45 CJD      METHICILLIN RESISTANT STAPHYLOCOCCUS AUREUS  Results called to and read back by:Javi Gordon RN  03/02/2020  11:05   JBM  JBM      Narrative:        Aerobic and anaerobic    Urine culture [[id number]] Collected:  02/29/20 1522    Order Status:  Completed Specimen:  Urine Updated:  03/02/20 0652     Urine Culture, Routine No growth    Narrative:       Preferred Collection Type->Urine, Clean Catch  Specimen Source->Urine    Blood culture [[id number]]     Order Status:  Canceled Specimen:  Blood         Imaging Reviewed:  Chest x-rays    CXR Mild central hilar bronchovascular crowding, possibly secondary to low lung volumes and atelectasis or very mild edema, or atypical infection/bronchitis as above    US1.  Sludge seen in the gallbladder, without findings to specifically suggest acute cholecystitis (noting borderline gallbladder wall thickening).  2.  Rounded echogenic focus in the right lobe of the liver, with imaging features of an hepatic hemangioma, consider ultrasound follow-up in 6 months to document stability.  3.  Numerous small periportal lymph nodes, likely reactive/inflammatory in nature.  Consider correlation with liver function tests and hepatitis serologies.    CT chest/abd/pelvis 3/3  Bilateral diffuse nodular predominantly pleural based opacities without cavitation.  Findings are most consistent with septic emboli.  Multifocal pneumonia could not be excluded.  There is bibasilar airspace disease which may represent atelectasis or infiltrates with small bilateral pleural effusions  Mild hepatosplenomegaly.  The hyperechoic mass in the right lobe of the liver is not seen on CT and most likely represents hemangioma  No acute abnormality within the abdomen or pelvis    Cardiology:    Transthoracic echo 3/1  Left Ventricle Normal ejection fraction at 60%. Normal left ventricular cavity size. Normal wall thickness observed. Normal left ventricular diastolic function. Normal left atrial pressure. No wall motion abnormalities.   Right Ventricle Normal cavity size, wall thickness and systolic function. Wall motion normal.   Left Atrium The left atrium is  normal.   Right Atrium There is normal right atrial size.   Aortic Valve The aortic valve appears structurally normal. There is no stenosis. No regurgitation. There is normal leaflet mobility.The LVOT diameter is 2.02 cm.   Mitral Valve The mitral valve appears structurally normal. There is normal leaflet mobility. No regurgitation.   Tricuspid Valve The tricuspid valve appears structurally normal. No stenosis. Trace regurgitation. There is normal leaflet mobility. The estimated PA systolic pressure is 26 mmHg.   Pulmonic Valve The pulmonic valve was not well visualized. No stenosis. No regurgitation.   IVC/SVC Normal central venous pressure (3 mm Hg).   Ascending Aorta The aortic root and ascending aorta are normal in size.   Pericardium No pericardial effusion.       IMPRESSION & PLAN     Persistent MRSA bacteremia(2/29, 3/1, 3/2(neg so far) 3/3 with GPC and fever.  Presumptive endocarditis in a patient with history of IV drug use. Septic pulmonary emboli    Elevated markers of inflammation including procal, CRP, ESR.  TELLY.  Improved.   Epigastric right upper quadrant discomfort and generalized abdominal discomfort.  Jaundice Transaminitis, heavy opiate use, H/o Hep C, untreated, ammonia normal:  Bilirubin improved  IVDU. heroin  Anemia   Protein malnutrition.  Hypoalbuminemia =  2.6    This patient is high risk for life-threatening deterioration and death secondary to above comorbidities and need for IV treatment.      Recommendations:  Continue daptomycin 8 mg per kg plus ceftaroline (shown to clear bacteremia more quickly)  Serial blood cultures until clear     Check direct bilirubin  Will need NAVA    Prognosis is poor

## 2020-03-05 ENCOUNTER — ANESTHESIA (OUTPATIENT)
Dept: INTENSIVE CARE | Facility: HOSPITAL | Age: 35
DRG: 871 | End: 2020-03-05
Payer: MEDICAID

## 2020-03-05 ENCOUNTER — ANESTHESIA EVENT (OUTPATIENT)
Dept: INTENSIVE CARE | Facility: HOSPITAL | Age: 35
DRG: 871 | End: 2020-03-05
Payer: MEDICAID

## 2020-03-05 PROBLEM — I38 ENDOCARDITIS: Status: ACTIVE | Noted: 2020-03-05

## 2020-03-05 LAB
ALBUMIN SERPL BCP-MCNC: 2.1 G/DL (ref 3.5–5.2)
ALP SERPL-CCNC: 165 U/L (ref 55–135)
ALT SERPL W/O P-5'-P-CCNC: 33 U/L (ref 10–44)
ANION GAP SERPL CALC-SCNC: 6 MMOL/L (ref 8–16)
AST SERPL-CCNC: 36 U/L (ref 10–40)
BILIRUB SERPL-MCNC: 3.1 MG/DL (ref 0.1–1)
BUN SERPL-MCNC: 12 MG/DL (ref 6–20)
CALCIUM SERPL-MCNC: 8.1 MG/DL (ref 8.7–10.5)
CHLORIDE SERPL-SCNC: 104 MMOL/L (ref 95–110)
CO2 SERPL-SCNC: 22 MMOL/L (ref 23–29)
CREAT SERPL-MCNC: 0.5 MG/DL (ref 0.5–1.4)
CRP SERPL-MCNC: 10.18 MG/DL (ref 0–0.75)
EST. GFR  (AFRICAN AMERICAN): >60 ML/MIN/1.73 M^2
EST. GFR  (NON AFRICAN AMERICAN): >60 ML/MIN/1.73 M^2
GLUCOSE SERPL-MCNC: 98 MG/DL (ref 70–110)
MAGNESIUM SERPL-MCNC: 1.6 MG/DL (ref 1.6–2.6)
POTASSIUM SERPL-SCNC: 4.3 MMOL/L (ref 3.5–5.1)
PROT SERPL-MCNC: 7.1 G/DL (ref 6–8.4)
SODIUM SERPL-SCNC: 132 MMOL/L (ref 136–145)

## 2020-03-05 PROCEDURE — 27000221 HC OXYGEN, UP TO 24 HOURS

## 2020-03-05 PROCEDURE — 25000003 PHARM REV CODE 250

## 2020-03-05 PROCEDURE — 63600175 PHARM REV CODE 636 W HCPCS: Mod: JG | Performed by: INTERNAL MEDICINE

## 2020-03-05 PROCEDURE — 25000003 PHARM REV CODE 250: Performed by: INTERNAL MEDICINE

## 2020-03-05 PROCEDURE — 94761 N-INVAS EAR/PLS OXIMETRY MLT: CPT

## 2020-03-05 PROCEDURE — 86140 C-REACTIVE PROTEIN: CPT

## 2020-03-05 PROCEDURE — 63600175 PHARM REV CODE 636 W HCPCS: Performed by: INTERNAL MEDICINE

## 2020-03-05 PROCEDURE — 87040 BLOOD CULTURE FOR BACTERIA: CPT

## 2020-03-05 PROCEDURE — 20000000 HC ICU ROOM

## 2020-03-05 PROCEDURE — 36415 COLL VENOUS BLD VENIPUNCTURE: CPT

## 2020-03-05 PROCEDURE — 99900035 HC TECH TIME PER 15 MIN (STAT)

## 2020-03-05 PROCEDURE — 12000002 HC ACUTE/MED SURGE SEMI-PRIVATE ROOM

## 2020-03-05 PROCEDURE — 80053 COMPREHEN METABOLIC PANEL: CPT

## 2020-03-05 PROCEDURE — 83735 ASSAY OF MAGNESIUM: CPT

## 2020-03-05 RX ORDER — IBUPROFEN 400 MG/1
400 TABLET ORAL EVERY 6 HOURS PRN
Status: DISCONTINUED | OUTPATIENT
Start: 2020-03-05 | End: 2020-03-18 | Stop reason: HOSPADM

## 2020-03-05 RX ADMIN — MUPIROCIN: 20 OINTMENT TOPICAL at 07:03

## 2020-03-05 RX ADMIN — DAPTOMYCIN 645 MG: 500 INJECTION, POWDER, LYOPHILIZED, FOR SOLUTION INTRAVENOUS at 10:03

## 2020-03-05 RX ADMIN — CEFTAROLINE FOSAMIL 600 MG: 600 POWDER, FOR SOLUTION INTRAVENOUS at 04:03

## 2020-03-05 RX ADMIN — MUPIROCIN: 20 OINTMENT TOPICAL at 08:03

## 2020-03-05 RX ADMIN — CHLORHEXIDINE GLUCONATE 15 ML: 1.2 RINSE ORAL at 08:03

## 2020-03-05 RX ADMIN — ACETAMINOPHEN 650 MG: 325 TABLET ORAL at 04:03

## 2020-03-05 RX ADMIN — OXYCODONE HYDROCHLORIDE 5 MG: 5 TABLET ORAL at 04:03

## 2020-03-05 RX ADMIN — ENOXAPARIN SODIUM 40 MG: 100 INJECTION SUBCUTANEOUS at 04:03

## 2020-03-05 RX ADMIN — CHLORHEXIDINE GLUCONATE 15 ML: 1.2 RINSE ORAL at 07:03

## 2020-03-05 RX ADMIN — MAGNESIUM OXIDE 800 MG: 400 TABLET ORAL at 06:03

## 2020-03-05 RX ADMIN — OXYCODONE HYDROCHLORIDE 5 MG: 5 TABLET ORAL at 10:03

## 2020-03-05 RX ADMIN — OXYCODONE HYDROCHLORIDE 5 MG: 5 TABLET ORAL at 07:03

## 2020-03-05 RX ADMIN — METHADONE HYDROCHLORIDE 30 MG: 10 TABLET ORAL at 08:03

## 2020-03-05 RX ADMIN — IBUPROFEN 400 MG: 400 TABLET ORAL at 02:03

## 2020-03-05 RX ADMIN — MAGNESIUM OXIDE 800 MG: 400 TABLET ORAL at 10:03

## 2020-03-05 RX ADMIN — CEFTAROLINE FOSAMIL 600 MG: 600 POWDER, FOR SOLUTION INTRAVENOUS at 07:03

## 2020-03-05 RX ADMIN — CEFTAROLINE FOSAMIL 600 MG: 600 POWDER, FOR SOLUTION INTRAVENOUS at 01:03

## 2020-03-05 NOTE — PROGRESS NOTES
[facility]  Adult Nutrition   Progress Note (Follow-Up)    SUMMARY     Recommendations  Recommendation/Intervention:   1. Recommend that a medical diagnosis of malnutrition be added to patient's problem list if physician agrees with dietitian's Nutrition Focused Physical Exam (NFPE) findings that support medical diagnosis.   · Moderate malnutrition in the context of social or environmental circumstances related to inadequate protein-energy intake during IV drug use as evidence by patient stating she has a decreased appetite during drug use, skips meals and sometimes goes 24 hours without eating, weight loss of 6.8 kg in 7 months (not severe, but may be masked by edema and bed weight is not most accurate measure), mild and trace edema noted, NFPE findings of mild fat depletion of orbitals and thoracic and lumbar region; and mild muscle depletion of  temples, clavicle region, scapular region and interosseous muscle.   2. Continue diet as tolerated and encourage intake of meals and supplements.   3. Medically manage nausea.   4. Recommend initiation of 100 mg/day thiamine, 1 mg Folic Acid, and daily MVI due to IVDU (heroine) history and nutritional diagnosis of malnutrition.   5. Recommend initiation of a probiotic due to administration of antibiotics.     Goals:   1. Malnutrition diagnosis to be added to patient's problem list prior to discharge.   2. Patient to tolerate regular consistency diet.   3. Patient to meet at least 75% of estimated energy and protein needs via PO intake of meals and supplements.   4. Nausea to be alleviated.   5. Vitamins and minerals to be initiated.   6. Probiotic to be initiated.     Nutrition Goal Status: new  Communication of CRYSTAL Recs: discussed on rounds     Dietitian Rounds Brief  · Patient seen for follow up. PO intake remains poor. (Intake has been < 50% of EEN and estimated protein needs since admission x 6 days). Patient was on clear liquids, diet was advanced to  "regular as tolerated to increase food variety. Patient is still only tolerating liquids due to nausea and withdrawal from IVDU per patient. Patient states she isn't really drinking ensure. RD preformed NFPE and relayed findings to patient (findings below). When RD return with broth, patient states she drank some ensure enlive and will continue to take supplements. RD notified patient of the risks involved with progression of malnutrition including but not limited to: skin breakdown, increased hospitalization length of stay, delayed wound healing, increased risk of infection. Patient expressed understanding and seems motivated to improve intake. Patient states she is willing to eat what she can. RD notified patient that if intake does not improve, RD would recommend an NGT and enteral nutrition. RD encourage intake as so need for EN can be avoided. Patient agreed and is going to try to improve intake. RD obtained food preferences and tolerances.   · Diet history: Patient states her intake was poor PTA due to "dope" use. Patient states she goes sometimes >24 hours without a meal. She sometimes eats foods that her dad has cooked at home or convenience items like donuts. She has a poor appetite during drug use. She states she did not notice any weakness during drug use because it makes her feel like she is strong. Patient had difficulty sitting up in bed for NFPE and became visibly SOB. Muscle weakness is evident.    Malnutrition Assessment  Clinical Characteristic:    Malnutrition in the context of social or environmental circumstances   Energy Intake:    <75% of estimated energy requirement for > 3 moths   Interpretation of Weight Loss:   · Weight loss of 6.8kg (7.8%) in about 7 months. Not severe, but edema may be masking further weight loss (this is also a bed scale weight).   Physical Findings   Body Fat Depletion:   o mild depletion of orbitals and thoracic and lumbar region    Muscle Mass Depletion:   o mild " depletion of temples, clavicle region, scapular region and interosseous muscle   Fluid Accumulation: Edema  Edema: generalized  Dependent Edema: 2+ (Mild)  Generalized Edema: 1+ (Trace)  Leg, Left Edema: 1+ (Trace)  Leg, Right Edema: 1+ (Trace)  Ankle, Left Edema: 2+ (Mild)  Ankle, Right Edema: 2+ (Mild)  Foot, Left Edema: 2+ (Mild)   Foot, Right Edema: 2+ (Mild)  o mild   Reduced  Strength:   o N/A     Nutritional Diagnosis (PES Statement)   Moderate malnutrition in the context of social or environmental circumstances related to inadequate protein-energy intake during IV drug use as evidence by patient stating she has a decreased appetite during drug use, skips meals and sometimes goes 24 hours without eating, weight loss of 6.8 kg in 7 months (not severe, but may be masked by edema and bed weight is not most accurate measure), mild and trace edema noted, NFPE findings of mild fat depletion of orbitals and thoracic and lumbar region; and mild muscle depletion of  temples, clavicle region, scapular region and interosseous muscle.     Reason for Assessment  Reason For Assessment: RD follow-up  Relevant Medical History: Acute endocarditis, IVDU (intravenous drug user), Hepatitis C, TELLY (acute kidney injury)  Interdisciplinary Rounds: attended  Nutrition Discharge Planning: Regular diet (3 meals per day), ensure enlive as needed, abstinence from drug use     Nutrition Risk Screen  Nutrition Risk Screen: no indicators present  [REMOVED]      Wound 03/01/20 0400 medial Buttocks-Wound Image: Images linked  MST Score: 0  Have you recently lost weight without trying?: No  Weight loss score: 0  Have you been eating poorly because of a decreased appetite?: No  Appetite score: 0       Nutrition/Diet History  Patient Reported Diet/Restrictions/Preferences: general, other (see comments)(sporatic intake due to IVDU )  Typical Food/Fluid Intake: fair   Spiritual, Cultural Beliefs, Episcopal Practices, Values that Affect  "Care: no  Food Allergies: NKFA  Factors Affecting Nutritional Intake: other (see comments), decreased appetite(nausea, only tolerating clear liquids )    Anthropometrics  Temp: 98.8 °F (37.1 °C)  Height Method: Stated  Height: 5' 8" (172.7 cm)  Height (inches): 68 in  Weight Method: Bed Scale  Weight: 80.7 kg (177 lb 14.6 oz)  Weight (lb): 177.91 lb  Ideal Body Weight (IBW), Female: 140 lb  % Ideal Body Weight, Female (lb): 133.07 %  BMI (Calculated): 27.1  BMI Grade: 25 - 29.9 - overweight  Weight Loss: unintentional  Usual Body Weight (UBW), k.4 kg(UBW = about 190 lbs per patient )  % Usual Body Weight: 93.6  % Weight Change From Usual Weight: -6.6 %       Weight History:  Wt Readings from Last 10 Encounters:   20 80.7 kg (177 lb 14.6 oz)   19 87.5 kg (193 lb)     RD interpretation of weight history: Weight loss of 6.8kg (7.8%) in about 7 months. Not severe, but edema may be masking further weight loss (this is also a bed scale weight).     Lab/Procedures/Meds: Pertinent Labs Reviewed  Clinical Chemistry:  Recent Labs   Lab 20  1515 20  1851  20  0318   *  132*  --    < > 132*   K 2.5*  2.5*  --    < > 4.3   CL 91*  91*  --    < > 104   CO2 26  26  --    < > 22*   GLU 88  88  --    < > 98   BUN 52*  52*  --    < > 12   CREATININE 1.8*  1.8*  --    < > 0.5   CALCIUM 8.5*  8.5*  --    < > 8.1*   PROT 6.9  6.9  --    < > 7.1   ALBUMIN 2.9*  2.9*  --    < > 2.1*   BILITOT 6.2*  6.2*  --    < > 3.1*   ALKPHOS 164*  164*  --    < > 165*   AST 72*  72*  --    < > 36   ALT 65*  65*  --    < > 33   ANIONGAP 15  15  --    < > 6*   ESTGFRAFRICA 42.0*  42.0*  --    < > >60.0   EGFRNONAA 36.5*  36.5*  --    < > >60.0   MG 1.7  --    < > 1.6   PHOS 3.6  --   --   --    AMYLASE  --  25  --   --    LIPASE  --  29  --   --     < > = values in this interval not displayed.     CBC:   Recent Labs   Lab 20  0358   WBC 16.94*   RBC 3.06*   HGB 9.3*   HCT 28.2*   * "   MCV 92   MCH 30.4   MCHC 33.0     Cardiac Profile:  Recent Labs   Lab 02/29/20  1515 02/29/20  1851   BNP 87  --    CPK 19*  --    CPKMB 1.5  --    TROPONINI 0.069* 0.156*     Inflammatory Labs:  Recent Labs   Lab 02/29/20  1515 03/02/20  0442 03/05/20  0318   CRP 24.33* 14.94* 10.18*     Medications: Pertinent Medications reviewed  Scheduled Meds:   ceftaroline (TEFLARO) IVPB  600 mg Intravenous Q8H    chlorhexidine  15 mL Mouth/Throat BID    DAPTOmycin (CUBICIN)  IV  8 mg/kg Intravenous Q24H    enoxaparin  40 mg Subcutaneous Daily    methadone  30 mg Oral Daily    mupirocin   Nasal BID     Continuous Infusions:   dexmedetomidine (PRECEDEX) infusion Stopped (03/04/20 1000)     PRN Meds:.acetaminophen, calcium chloride IVPB, calcium chloride IVPB, calcium chloride IVPB, HYDROmorphone, ibuprofen, lorazepam, magnesium oxide, magnesium sulfate IVPB, magnesium sulfate IVPB, magnesium sulfate IVPB, magnesium sulfate IVPB, ondansetron, oxyCODONE, potassium chloride in water, potassium chloride in water, potassium chloride in water, potassium chloride in water, potassium chloride, potassium chloride, potassium chloride, potassium chloride, DIPH,PERTUS (ADACEL),TETANUS PF VAC (ADULT)    Estimated/Assessed Needs  Weight Used For Calorie Calculations: 80.7 kg (177 lb 14.6 oz)  Energy Calorie Requirements (kcal): 2018 - 2421 (25 - 30)   Energy Need Method: Kcal/kg  Protein Requirements: 97 - 121 (1.2 - 1.5)   Weight Used For Protein Calculations: 80.7 kg (177 lb 14.6 oz)     Estimated Fluid Requirement Method: RDA Method  RDA Method (mL): 2018       Nutrition Prescription Ordered  Current Diet Order: Regular as tolerated   Oral Nutrition Supplement: Ensure Enlive TID     Evaluation of Received Nutrient/Fluid Intake  Energy Calories Required: not meeting needs  Protein Required: not meeting needs  Fluid Required: meeting needs  Tolerance: not tolerating(really only taking clears still)     Intake/Output Summary (Last  24 hours) at 3/5/2020 1347  Last data filed at 3/5/2020 0600  Gross per 24 hour   Intake 500 ml   Output 2150 ml   Net -1650 ml      % Intake of Estimated Energy Needs: 0 - 25 %  % Meal Intake: 0 - 25 %    Nutrition Risk  Level of Risk/Frequency of Follow-up: high     Monitor and Evaluation  Food and Nutrient Intake: food and beverage intake, energy intake  Food and Nutrient Adminstration: diet order  Physical Activity and Function: nutrition-related ADLs and IADLs, factors affecting access to physical activity  Anthropometric Measurements: weight, weight change, body mass index  Biochemical Data, Medical Tests and Procedures: electrolyte and renal panel, gastrointestinal profile, glucose/endocrine profile, inflammatory profile, lipid profile  Nutrition-Focused Physical Findings: overall appearance, extremities, muscles and bones, head and eyes, skin     Nutrition Follow-Up  RD Follow-up?: Yes     Jessica Taylor RD 03/05/2020 1:58 PM

## 2020-03-05 NOTE — ANESTHESIA PREPROCEDURE EVALUATION
2020  Soraya Gee is a 34 y.o., female.   Patient Active Problem List   Diagnosis    Acute endocarditis    IVDU (intravenous drug user)    Hepatitis C    TELLY (acute kidney injury)       Past Surgical History:   Procedure Laterality Date     SECTION          Tobacco Use:  The patient  reports that she has been smoking cigarettes. She has a 8.00 pack-year smoking history. She has never used smokeless tobacco.     Results for orders placed or performed during the hospital encounter of 20   EKG 12-lead    Collection Time: 20  3:24 PM    Narrative    Test Reason : R07.9,    Vent. Rate : 117 BPM     Atrial Rate : 117 BPM     P-R Int : 202 ms          QRS Dur : 096 ms      QT Int : 274 ms       P-R-T Axes : 062 052 056 degrees     QTc Int : 382 ms    Sinus tachycardia  Possible Left atrial enlargement  Nonspecific ST and T wave abnormality  Abnormal ECG  No previous ECGs available    Referred By:             Confirmed By:         Imaging Results          X-Ray Chest AP Portable (Final result)  Result time 20 15:28:29    Final result by Daryl Kyle MD (20 15:28:29)                 Impression:      Mild central hilar bronchovascular crowding, possibly secondary to low lung volumes and atelectasis or very mild edema, or atypical infection/bronchitis as above      Electronically signed by: Daryl Kyle MD  Date:    2020  Time:    15:28             Narrative:    CLINICAL HISTORY:  ([id number])35 y/o  (1985) F    trauma;    TECHNIQUE:  (A#[id number], exam time 2020 15:24)    XR CHEST AP PORTABLE [id number]    COMPARISON:  None available.    FINDINGS:  Mildly increased central hilar interstitial opacities are seen bilaterally suggestive of either mild edema  atypical infection/pneumonia or bronchitis in the appropriate clinical setting. No consolidative  pneumonia is seen. Costophrenic angles are seen without effusion. No pneumothorax is identified. The heart is top normal in size. The mediastinum is within normal limits. Osseous structures appear within normal limits. The visualized upper abdomen is unremarkable.                                 Lab Results   Component Value Date    WBC 16.94 (H) 03/04/2020    HGB 9.3 (L) 03/04/2020    HCT 28.2 (L) 03/04/2020    MCV 92 03/04/2020     (L) 03/04/2020     BMP  Lab Results   Component Value Date     (L) 03/05/2020    K 4.3 03/05/2020     03/05/2020    CO2 22 (L) 03/05/2020    BUN 12 03/05/2020    CREATININE 0.5 03/05/2020    CALCIUM 8.1 (L) 03/05/2020    ANIONGAP 6 (L) 03/05/2020    ESTGFRAFRICA >60.0 03/05/2020    EGFRNONAA >60.0 03/05/2020             Pre-op Assessment    I have reviewed the Patient Summary Reports.     I have reviewed the Nursing Notes.   I have reviewed the Medications.     Review of Systems  Anesthesia Hx:  No problems with previous Anesthesia  Denies Family Hx of Anesthesia complications.   Denies Personal Hx of Anesthesia complications.   Social:  Smoker, Alcohol Use Hx of IVDU   Hematology/Oncology:  Hematology Normal        Cardiovascular:   Valvular problems/Murmurs (suspected endocarditis)    Pulmonary:  Pulmonary Normal    Renal/:   Chronic Renal Disease (TELLY on admit)    Hepatic/GI:   Hepatitis, C    Musculoskeletal:  Musculoskeletal Normal    Neurological:  Neurology Normal    Endocrine:  Endocrine Normal        Physical Exam  General:  Well nourished    Airway/Jaw/Neck:  Airway Findings: Mouth Opening: Normal Tongue: Normal  General Airway Assessment: Adult  Mallampati: III  TM Distance: Normal, at least 6 cm  Jaw/Neck Findings:  Neck ROM: Normal ROM       Chest/Lungs:  Chest/Lungs Findings: Clear to auscultation, Normal Respiratory Rate     Heart/Vascular:  Heart Findings: Rate: Normal  Rhythm: Regular Rhythm  Sounds: Normal     Abdomen:  Abdomen Findings: Normal     Musculoskeletal:  Musculoskeletal Findings: Normal   Skin:  Skin Findings: Normal    Mental Status:  Mental Status Findings:  Cooperative, Alert and Oriented         Anesthesia Plan  Type of Anesthesia, risks & benefits discussed:  Anesthesia Type:  MAC  Patient's Preference:   Intra-op Monitoring Plan: standard ASA monitors  Intra-op Monitoring Plan Comments:   Post Op Pain Control Plan: per primary service following discharge from PACU  Post Op Pain Control Plan Comments:   Induction:    Beta Blocker:         Informed Consent: Patient understands risks and agrees with Anesthesia plan.  Questions answered. Anesthesia consent signed with patient.  ASA Score: 3  emergent   Day of Surgery Review of History & Physical:        Anesthesia Plan Notes: MAC  Propofol

## 2020-03-05 NOTE — PLAN OF CARE
NAEON. Remains off Precedex and IV pain relief/benzos. Oxycodone for BTP given. Fall precautions maintained. Bed wheels locked, bed in lowest position, upper SR up x 2, call light in reach, non-skid socks when OOB. Instructed pt to call for assistance as needed.

## 2020-03-05 NOTE — CARE UPDATE
03/04/20 2005   Patient Assessment/Suction   Level of Consciousness (AVPU) alert   All Lung Fields Breath Sounds diminished   PRE-TX-O2   O2 Device (Oxygen Therapy) nasal cannula   $ Is the patient on Low Flow Oxygen? Yes   Flow (L/min) 1   SpO2 (!) 90 %   Pulse Oximetry Type Continuous   $ Pulse Oximetry - Multiple Charge Pulse Oximetry - Multiple   Pulse (!) 111   Resp (!) 54   Respiratory Evaluation   $ Care Plan Tech Time 15 min   Evaluation For Re-Eval 3 day

## 2020-03-05 NOTE — PROGRESS NOTES
[facility]  Progress Note    Patient Name: Soraya Gee  MRN: [id number]  Patient Class: IP- Inpatient   Admission Date: 2/29/2020  Length of Stay: 4 days  Attending Physician: Og Thompson MD  Primary Care Provider: Jus Griggs Iii, MD        Subjective:     Principal Problem:Acute endocarditis        HPI:  34-year-old patient getting admitted with suspected sepsis from acute infective endocarditis  Patient is a IV drug abuse and uses heroin on a daily basis  For the past 2 days she has been having fever/myalgia/malaise and generalized weakness all over the body  Later she started having shortness of breath and patient did notice that her legs are getting more swollen  Because of the persistence of symptoms she came to the emergency room and got admitted  Patient initially thought she might have got cotton fever  Per patient if she will not take heroin every day she will develop severe withdrawal symptoms  No other issues.  She lives alone but has got a boyfriend who lives very nearby       Overview/Hospital Course:  Patient was admitted with most likely bacterial endocarditis  She has daily urine IV drug user.  Methadone started yesterday and increased the dose further  Still having persistent fevers, blood culture with persistent  MRSA  Seen and examined the patient she is awake alert oriented and able to answer all the questions, off Precedex  Patient is going to need NAVA    3/3: Ms Gee is sedated on precedex and methadone. Pt arouses but has limited response to questions but she did move all extremities,    3/4: Pt is awake and alert with c/o 8/10 flank pain. Pt has 4/10 pain with oxycontin for break thru pain. She has no neurologic complaints. I have discussed the case with Dr Rodriguez and appreciate her imput. Pt nurse and I discussed the need for rehab Re opiate dependency.    Interval History: Case discussed with Dr Connell; her imput is greatly appreciated    Review of  Systems   Unable to perform ROS: Other (sedated)     Objective:     Vital Signs (Most Recent):  Temp: 100.2 °F (37.9 °C) (03/03/20 1105)  Pulse: 84 (03/03/20 1130)  Resp: (!) 45 (03/03/20 1130)  BP: (!) 89/49 (03/03/20 1105)  SpO2: (!) 94 % (03/03/20 1130) Vital Signs (24h Range):  Temp:  [99.5 °F (37.5 °C)-103 °F (39.4 °C)] 100.2 °F (37.9 °C)  Pulse:  [] 84  Resp:  [30-78] 45  SpO2:  [92 %-99 %] 94 %  BP: ()/(46-95) 89/49     Weight: 80.7 kg (178 lb)  Body mass index is 27.06 kg/m².    Intake/Output Summary (Last 24 hours) at 3/3/2020 1211  Last data filed at 3/3/2020 1100  Gross per 24 hour   Intake 2431.83 ml   Output 2800 ml   Net -368.17 ml      Physical Exam   Constitutional: No distress.   HENT:   Head: Normocephalic and atraumatic.   Eyes: Pupils are equal, round, and reactive to light. EOM are normal.   Neck: Normal range of motion. Neck supple.   Cardiovascular: Normal rate.   Murmur heard.  Pulmonary/Chest: Effort normal. She has rales.   Poor cooperation   Abdominal: Soft. Bowel sounds are normal.   Skin: She is not diaphoretic.       Significant Labs:   BMP:   Recent Labs   Lab 03/03/20 0452   GLU 97      K 3.9      CO2 29   BUN 16   CREATININE 0.4*   CALCIUM 8.1*   MG 1.5*     CBC:   Recent Labs   Lab 03/01/20 2014 03/02/20 0442 03/03/20 0452   WBC  --  15.82* 16.22*   HGB  --  10.3* 11.1*   HCT 38 29.9* 32.6*   PLT  --  88* 144*     CMP:   Recent Labs   Lab 03/01/20 2030 03/02/20 0442 03/03/20 0452    138 137   K 3.8 3.2* 3.9    100 103   CO2 22* 30* 29   GLU 88 126* 97   BUN 26* 25* 16   CREATININE 0.7 0.6 0.4*   CALCIUM 8.7 8.5* 8.1*   PROT  --  5.6* 6.3   ALBUMIN  --  2.1* 2.1*   BILITOT  --  7.4* 6.2*   ALKPHOS  --  129 152*   AST  --  46* 48*   ALT  --  43 41   ANIONGAP 14 8 5*   EGFRNONAA >60.0 >60.0 >60.0     Cardiac Markers: No results for input(s): CKMB, MYOGLOBIN, BNP, TROPISTAT in the last 48 hours.  Troponin: No results for input(s): TROPONINI in  the last 48 hours.    Significant Imaging: I have reviewed all pertinent imaging results/findings within the past 24 hours.      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  Echo was reading as normal but as per cardiologist not able to see some area properly.      Plan   Continue IV antibiotics   she will need NAVA when she is more awake and alert . She is off precedix  Follow card and ID   PICC line placement       TELLY (acute kidney injury)    resolved      Hepatitis C  History of hepatitis C  Right now bilirubin level is on the higher range along with slight elevation of hepatic panels    Plan    ultrasound of the abdomen with hepatic hemangioma, ultrasound follow-up in 6 months   AFP ordered      IVDU (intravenous drug user)  Patient uses heroin on daily basis  Watch closely/monitor for withdrawal symptoms  Methadone started          VTE Risk Mitigation (From admission, onward)         Ordered     enoxaparin injection 40 mg  Daily      02/29/20 1725     IP VTE HIGH RISK PATIENT  Once      02/29/20 1725                      Rafa Galeano MD  Department of Hospital Medicine   [facility]

## 2020-03-05 NOTE — PROGRESS NOTES
"Progress Note  Infectious Disease    Reason for Consult:  Probable endocarditis    02/29/2020.  Consult note. Soraya Gee is a  appearing 34 y.o. female with past medical history of hepatitis-C, IV drug use, in present for 3 years, released in March 2019, that is when she resumed IV drugs.  She comes in complaining of shaking chills, fever, myalgia, malaise, generalized weakness, 3 days duration after injecting some "bad drugs".  Initially she thought she had cotton fever but the symptoms persisted so she came to ER.  Patient also complains of shortness of breath, she was tachycardic and tachypneic on admission.  She has noticed swelling throughout.  Her albumin is low  Patient is a IV drug abuse and uses heroin twice a day, for a total daily dose of 1 g.  No sick contacts.  She is admitted in ICU for closer observation to make sure she does not withdrawal.  Patient is tachycardic, tachypneic, hurting all over, her history is limited by although this    03/01/2020.  Withdrawal symptoms are improved with Precedex drip; although she still feels like she is breathing fast and she is hurting all over.  She spiked fever of 101.3.  Blood cultures came positive for GPC.  No urinary complaints, no new skin or joint complaint, no cough or phlegm.  03/02/2020.  She spiked another fever of 102.3.  All cultures are turning up to be MRSA  CRP improved from 20/5 down to 13.  WBC is still elevated.  She gets confused on and off but then she interacts appropriately.    3/3:  Chart reviewed and discussed with Dr. Mccormack.  Persistently positive blood cultures with MRSA, vancomycin EDDIE 1.  Persistently hectic fever and septic appearance, improve hyperbilirubinemia, right upper quadrant tenderness, generalized aches and pains, sedated a bit on Precedex and methadone, neurologically intact.  3/4: still febrile through late last night. 3/2 cultures negative, but 3/3 cultures have GPC. CT chest/abd/pelvis reviewed and septic pulmonary " "emboli are present, but no spleen, muscle or paraspinous lesions noted.  She is currently up in the chair, off Precedex.  We discussed her diagnosis and she is willing to receive the treatment.  Does not have much appetite but she will drink the in [place].  She still hurts everywhere and does have some pleurisy.  3/5: temperature improving. Blood cultures from yesterday are negative so far. She looks much improved, clearer mentally, still very uncomfortable, somewhat anxious. Not remembering information given yesterday. Expresses desire to "get off " the drugs. Still has no appetite. Drinking a little ensure. No diarrhea. Still splinting from pleurisy. Discussed with her about the NAVA. Length of therapy.     Antibiotics (From admission, onward)    Start     Stop Route Frequency Ordered    03/03/20 1000  DAPTOmycin (CUBICIN) 645 mg in sodium chloride 0.9% IVPB      -- IV Every 24 hours (non-standard times) 03/03/20 0847    03/03/20 1000  ceftaroline (TEFLARO) 600 mg in dextrose 5 % 50 mL IVPB (ready to mix system)      -- IV Every 8 hours (non-standard times) 03/03/20 0847    03/01/20 1230  mupirocin 2 % ointment  (MRSA Decolonization Orders [facility])      03/06 0859 Nasl 2 times daily 03/01/20 1126        Antifungals (From admission, onward)    None        Antivirals (From admission, onward)    None            EXAM & DIAGNOSTICS REVIEWED:   Vitals:     Temp:  [98.2 °F (36.8 °C)-101.8 °F (38.8 °C)]   Temp: 98.8 °F (37.1 °C) (03/05/20 0800)  Pulse: 87 (03/05/20 0800)  Resp: (!) 40 (03/05/20 0800)  BP: (!) 159/97 (03/05/20 0800)  SpO2: (!) 93 % (03/05/20 0800)    Intake/Output Summary (Last 24 hours) at 3/5/2020 0839  Last data filed at 3/5/2020 0600  Gross per 24 hour   Intake 893 ml   Output 2725 ml   Net -1832 ml     Vitals:    03/05/20 0800   BP: (!) 159/97   Pulse: 87   Resp: (!) 40   Temp: 98.8 °F (37.1 °C)         General:  Alert cooperative, tachypneic, uncomfortable, up in the chair  Eyes:  Less icteric, PERRL, " EOMI, no scleral conjunctival lesions  ENT:  No ulcers, exudates, thrush, nares patent, edentulous/dentures.     Neck:  Supple,   Lungs: Tachypneic, clearer, but still splinting  Heart:  Regular, tachycardic common soft 1/6 systolic murmur  Abd:  Soft, ND, normal BS, tenderness in the right upper quadrant, over the liver.  :  Vasquez catheter, with dark  urine    Musc:  No focal joint swelling, synovitis, myositis, moves all extremities  Skin:  Marks from prior IV use.  Palms and soles are erythematous, without septic emboli, no subungual petechia   Wound:   Neuro:  speech fluent, face symmetric, moves all extremities, no focal weakness.   Psych:  Alert,anxious,  cooperative   Lymphatic:     No cervical, supraclavicular, axillary, or inguinal nodes  Extrem:  No palpable edema but hands and feet are less edematous  No  , phlebitis, cellulitis, warm and well perfused  VAD:   Peripherals   Isolation:  Contact    Lines/Tubes/Drains:  Peripheral IV    General Labs reviewed:  Recent Labs   Lab 03/02/20 0442 03/03/20 0452 03/04/20  0358   WBC 15.82* 16.22* 16.94*   HGB 10.3* 11.1* 9.3*   HCT 29.9* 32.6* 28.2*   PLT 88* 144* 137*       Recent Labs   Lab 03/03/20  0452 03/04/20  0358 03/05/20  0318    133* 132*   K 3.9 3.7 4.3    103 104   CO2 29 22* 22*   BUN 16 19 12   CREATININE 0.4* 0.6 0.5   CALCIUM 8.1* 7.9* 8.1*   PROT 6.3 6.4 7.1   BILITOT 6.2* 3.9* 3.1*   ALKPHOS 152* 130 165*   ALT 41 32 33   AST 48* 30 36       Recent Labs   Lab 02/29/20  1515 03/02/20 0442 03/05/20 0318   CRP 24.33* 14.94* 10.18*             Micro:  Microbiology Results (last 7 days)     Procedure Component Value Units Date/Time    Blood culture [[id number]]  (Abnormal) Collected:  03/02/20 0441    Order Status:  Completed Specimen:  Blood from Antecubital, Right Updated:  03/05/20 0725     Blood Culture, Routine Gram stain aer bottle: Gram positive cocci in clusters resembling Staph      Positive results previously called on  [id number]      STAPHYLOCOCCUS AUREUS  Susceptibility pending      Blood culture [[id number]] Collected:  03/04/20 0358    Order Status:  Completed Specimen:  Blood Updated:  03/05/20 0632     Blood Culture, Routine No Growth to date      No Growth to date    Blood culture [[id number]] Collected:  03/05/20 0317    Order Status:  Sent Specimen:  Blood Updated:  03/05/20 0407    Blood culture [[id number]]  (Abnormal) Collected:  03/03/20 0502    Order Status:  Completed Specimen:  Blood Updated:  03/04/20 1119     Blood Culture, Routine Gram stain aer bottle: Gram positive cocci       Results called to and read back by: Israel Ledesma RN MICU.      METHICILLIN RESISTANT STAPHYLOCOCCUS AUREUS  For susceptibility see order #[id number]      Blood culture [[id number]]  (Abnormal) Collected:  02/29/20 1605    Order Status:  Completed Specimen:  Blood from Peripheral, Upper Arm, Right Updated:  03/03/20 0617     Blood Culture, Routine Gram stain aer bottle: Gram positive cocci      Gram stain porfirio bottle: Gram positive cocci      Positive results previously called      METHICILLIN RESISTANT STAPHYLOCOCCUS AUREUS  For susceptibility see order #[id number]      Blood culture x two cultures. Draw prior to antibiotics. [[id number]]  (Abnormal) Collected:  02/29/20 1517    Order Status:  Completed Specimen:  Blood from Peripheral, Forearm, Right Updated:  03/03/20 0616     Blood Culture, Routine Gram stain aer bottle: Gram positive cocci      Gram stain porfirio bottle: Gram positive cocci      Results called to and read back by:Xenia Sargent RN-2BICU;  03/01/2020        02:44 CJD      METHICILLIN RESISTANT STAPHYLOCOCCUS AUREUS  For susceptibility see order #[id number]      Narrative:       Aerobic and anaerobic    Blood culture [[id number]]  (Abnormal) Collected:  03/01/20 0521    Order Status:  Completed Specimen:  Blood Updated:  03/03/20 0616     Blood Culture, Routine Gram stain aer bottle: Gram positive cocci      Results called to and  read back by:Tami Posadas RN-2BICU;        03/02/2020  02:01 CJD      METHICILLIN RESISTANT STAPHYLOCOCCUS AUREUS  For susceptibility see order #[id number]      Blood culture x two cultures. Draw prior to antibiotics. [[id number]]  (Abnormal)  (Susceptibility) Collected:  02/29/20 1542    Order Status:  Completed Specimen:  Blood from Peripheral, Forearm, Right Updated:  03/03/20 0614     Blood Culture, Routine Gram stain aer bottle: Gram positive cocci      Gram stain porfirio bottle: Gram positive cocci      Results called to and read back by:Xenia Sargent RN-2BICU;  03/01/2020        02:45 CJD      METHICILLIN RESISTANT STAPHYLOCOCCUS AUREUS  Results called to and read back by:Javi Gordon RN  03/02/2020  11:05   JBM  JBM      Narrative:       Aerobic and anaerobic    Urine culture [[id number]] Collected:  02/29/20 1522    Order Status:  Completed Specimen:  Urine Updated:  03/02/20 0652     Urine Culture, Routine No growth    Narrative:       Preferred Collection Type->Urine, Clean Catch  Specimen Source->Urine    Blood culture [[id number]]     Order Status:  Canceled Specimen:  Blood         Imaging Reviewed:  Chest x-rays    CXR Mild central hilar bronchovascular crowding, possibly secondary to low lung volumes and atelectasis or very mild edema, or atypical infection/bronchitis as above    US1.  Sludge seen in the gallbladder, without findings to specifically suggest acute cholecystitis (noting borderline gallbladder wall thickening).  2.  Rounded echogenic focus in the right lobe of the liver, with imaging features of an hepatic hemangioma, consider ultrasound follow-up in 6 months to document stability.  3.  Numerous small periportal lymph nodes, likely reactive/inflammatory in nature.  Consider correlation with liver function tests and hepatitis serologies.    CT chest/abd/pelvis 3/3  Bilateral diffuse nodular predominantly pleural based opacities without cavitation.  Findings are most consistent with septic  emboli.  Multifocal pneumonia could not be excluded.  There is bibasilar airspace disease which may represent atelectasis or infiltrates with small bilateral pleural effusions  Mild hepatosplenomegaly.  The hyperechoic mass in the right lobe of the liver is not seen on CT and most likely represents hemangioma  No acute abnormality within the abdomen or pelvis    Cardiology:    Transthoracic echo 3/1  Left Ventricle Normal ejection fraction at 60%. Normal left ventricular cavity size. Normal wall thickness observed. Normal left ventricular diastolic function. Normal left atrial pressure. No wall motion abnormalities.   Right Ventricle Normal cavity size, wall thickness and systolic function. Wall motion normal.   Left Atrium The left atrium is normal.   Right Atrium There is normal right atrial size.   Aortic Valve The aortic valve appears structurally normal. There is no stenosis. No regurgitation. There is normal leaflet mobility.The LVOT diameter is 2.02 cm.   Mitral Valve The mitral valve appears structurally normal. There is normal leaflet mobility. No regurgitation.   Tricuspid Valve The tricuspid valve appears structurally normal. No stenosis. Trace regurgitation. There is normal leaflet mobility. The estimated PA systolic pressure is 26 mmHg.   Pulmonic Valve The pulmonic valve was not well visualized. No stenosis. No regurgitation.   IVC/SVC Normal central venous pressure (3 mm Hg).   Ascending Aorta The aortic root and ascending aorta are normal in size.   Pericardium No pericardial effusion.       IMPRESSION & PLAN     Persistent MRSA bacteremia(2/29, 3/1, 3/2 3/3 ) 3/4 neg so far.  Presumptive endocarditis in a patient with history of IV drug use. Septic pulmonary emboli    Elevated markers of inflammation including procal, CRP, ESR. improving  TELLY.  Improved.   Epigastric right upper quadrant discomfort and generalized abdominal discomfort.  Jaundice Transaminitis, heavy opiate use, H/o Hep C, untreated,  ammonia normal:  Bilirubin improved  IVDU. heroin  Anemia   Protein malnutrition.  Hypoalbuminemia =  2.6    This patient is high risk for life-threatening deterioration and death secondary to above comorbidities and need for IV treatment.      Recommendations:  Continue daptomycin 8 mg per kg plus ceftaroline (shown to clear bacteremia more quickly)  Serial blood cultures until clear   remove luz  Will need NAVA, will notify cardiology    Prognosis is improved

## 2020-03-05 NOTE — PLAN OF CARE
Problem: Oral Intake Inadequate  Goal: Improved Oral Intake  Outcome: Ongoing, Progressing  Intervention: Promote and Optimize Oral Intake  Flowsheets (Taken 3/5/2020 1356)  Oral Nutrition Promotion: calorie dense liquids provided     Problem: Malnutrition  Goal: Improved Nutritional Intake  Outcome: Ongoing, Progressing  Intervention: Promote and Optimize Oral Intake  Flowsheets (Taken 3/5/2020 1358)  Oral Nutrition Promotion: calorie dense liquids provided

## 2020-03-06 ENCOUNTER — CLINICAL SUPPORT (OUTPATIENT)
Dept: CARDIOLOGY | Facility: HOSPITAL | Age: 35
DRG: 871 | End: 2020-03-06
Attending: INTERNAL MEDICINE
Payer: MEDICAID

## 2020-03-06 LAB
ALBUMIN SERPL BCP-MCNC: 2.6 G/DL (ref 3.5–5.2)
ALP SERPL-CCNC: 188 U/L (ref 55–135)
ALT SERPL W/O P-5'-P-CCNC: 34 U/L (ref 10–44)
ANION GAP SERPL CALC-SCNC: 8 MMOL/L (ref 8–16)
AST SERPL-CCNC: 31 U/L (ref 10–40)
BACTERIA BLD CULT: ABNORMAL
BASOPHILS NFR BLD: 0 % (ref 0–1.9)
BILIRUB SERPL-MCNC: 3.5 MG/DL (ref 0.1–1)
BUN SERPL-MCNC: 13 MG/DL (ref 6–20)
CALCIUM SERPL-MCNC: 8.5 MG/DL (ref 8.7–10.5)
CHLORIDE SERPL-SCNC: 104 MMOL/L (ref 95–110)
CO2 SERPL-SCNC: 19 MMOL/L (ref 23–29)
CREAT SERPL-MCNC: 0.5 MG/DL (ref 0.5–1.4)
DIFFERENTIAL METHOD: ABNORMAL
EOSINOPHIL NFR BLD: 0 % (ref 0–8)
ERYTHROCYTE [DISTWIDTH] IN BLOOD BY AUTOMATED COUNT: 14.6 % (ref 11.5–14.5)
EST. GFR  (AFRICAN AMERICAN): >60 ML/MIN/1.73 M^2
EST. GFR  (NON AFRICAN AMERICAN): >60 ML/MIN/1.73 M^2
GLUCOSE SERPL-MCNC: 102 MG/DL (ref 70–110)
HCT VFR BLD AUTO: 31.6 % (ref 37–48.5)
HGB BLD-MCNC: 10.7 G/DL (ref 12–16)
IMM GRANULOCYTES # BLD AUTO: ABNORMAL K/UL (ref 0–0.04)
IMM GRANULOCYTES NFR BLD AUTO: ABNORMAL % (ref 0–0.5)
LYMPHOCYTES NFR BLD: 11 % (ref 18–48)
MAGNESIUM SERPL-MCNC: 1.7 MG/DL (ref 1.6–2.6)
MCH RBC QN AUTO: 30.7 PG (ref 27–31)
MCHC RBC AUTO-ENTMCNC: 33.9 G/DL (ref 32–36)
MCV RBC AUTO: 91 FL (ref 82–98)
MONOCYTES NFR BLD: 1 % (ref 4–15)
NEUTROPHILS NFR BLD: 87 % (ref 38–73)
NEUTS BAND NFR BLD MANUAL: 1 %
NRBC BLD-RTO: 0 /100 WBC
PLATELET # BLD AUTO: 288 K/UL (ref 150–350)
PMV BLD AUTO: 11.2 FL (ref 9.2–12.9)
POTASSIUM SERPL-SCNC: 4.4 MMOL/L (ref 3.5–5.1)
PROT SERPL-MCNC: 8.9 G/DL (ref 6–8.4)
RBC # BLD AUTO: 3.48 M/UL (ref 4–5.4)
SODIUM SERPL-SCNC: 131 MMOL/L (ref 136–145)
WBC # BLD AUTO: 21.65 K/UL (ref 3.9–12.7)

## 2020-03-06 PROCEDURE — 63600175 PHARM REV CODE 636 W HCPCS: Mod: JG | Performed by: INTERNAL MEDICINE

## 2020-03-06 PROCEDURE — 12000002 HC ACUTE/MED SURGE SEMI-PRIVATE ROOM

## 2020-03-06 PROCEDURE — 63600175 PHARM REV CODE 636 W HCPCS: Performed by: NURSE ANESTHETIST, CERTIFIED REGISTERED

## 2020-03-06 PROCEDURE — 83735 ASSAY OF MAGNESIUM: CPT

## 2020-03-06 PROCEDURE — 87147 CULTURE TYPE IMMUNOLOGIC: CPT | Mod: 59

## 2020-03-06 PROCEDURE — 63600175 PHARM REV CODE 636 W HCPCS: Performed by: INTERNAL MEDICINE

## 2020-03-06 PROCEDURE — 87077 CULTURE AEROBIC IDENTIFY: CPT

## 2020-03-06 PROCEDURE — 36569 INSJ PICC 5 YR+ W/O IMAGING: CPT

## 2020-03-06 PROCEDURE — 25000003 PHARM REV CODE 250: Performed by: INTERNAL MEDICINE

## 2020-03-06 PROCEDURE — 27000671 HC TUBING MICROBORE EXT: Performed by: ANESTHESIOLOGY

## 2020-03-06 PROCEDURE — 27000221 HC OXYGEN, UP TO 24 HOURS

## 2020-03-06 PROCEDURE — 87186 SC STD MICRODIL/AGAR DIL: CPT

## 2020-03-06 PROCEDURE — 93321 DOPPLER ECHO F-UP/LMTD STD: CPT

## 2020-03-06 PROCEDURE — 80053 COMPREHEN METABOLIC PANEL: CPT

## 2020-03-06 PROCEDURE — 94761 N-INVAS EAR/PLS OXIMETRY MLT: CPT

## 2020-03-06 PROCEDURE — 85027 COMPLETE CBC AUTOMATED: CPT

## 2020-03-06 PROCEDURE — 85007 BL SMEAR W/DIFF WBC COUNT: CPT

## 2020-03-06 PROCEDURE — 87040 BLOOD CULTURE FOR BACTERIA: CPT

## 2020-03-06 PROCEDURE — 36415 COLL VENOUS BLD VENIPUNCTURE: CPT

## 2020-03-06 RX ORDER — PROPOFOL 10 MG/ML
VIAL (ML) INTRAVENOUS
Status: DISCONTINUED | OUTPATIENT
Start: 2020-03-06 | End: 2020-03-06

## 2020-03-06 RX ORDER — LORAZEPAM 2 MG/ML
1 INJECTION INTRAMUSCULAR EVERY 8 HOURS PRN
Status: DISCONTINUED | OUTPATIENT
Start: 2020-03-06 | End: 2020-03-18 | Stop reason: HOSPADM

## 2020-03-06 RX ORDER — SODIUM CHLORIDE 9 MG/ML
INJECTION, SOLUTION INTRAVENOUS CONTINUOUS PRN
Status: DISCONTINUED | OUTPATIENT
Start: 2020-03-06 | End: 2020-03-06

## 2020-03-06 RX ADMIN — PROPOFOL 10 MG: 10 INJECTION, EMULSION INTRAVENOUS at 09:03

## 2020-03-06 RX ADMIN — PROPOFOL 30 MG: 10 INJECTION, EMULSION INTRAVENOUS at 09:03

## 2020-03-06 RX ADMIN — PROPOFOL 40 MG: 10 INJECTION, EMULSION INTRAVENOUS at 09:03

## 2020-03-06 RX ADMIN — METHADONE HYDROCHLORIDE 30 MG: 10 TABLET ORAL at 08:03

## 2020-03-06 RX ADMIN — CEFTAROLINE FOSAMIL 600 MG: 600 POWDER, FOR SOLUTION INTRAVENOUS at 11:03

## 2020-03-06 RX ADMIN — DAPTOMYCIN 645 MG: 500 INJECTION, POWDER, LYOPHILIZED, FOR SOLUTION INTRAVENOUS at 10:03

## 2020-03-06 RX ADMIN — SODIUM CHLORIDE: 900 INJECTION, SOLUTION INTRAVENOUS at 09:03

## 2020-03-06 RX ADMIN — PROPOFOL 60 MG: 10 INJECTION, EMULSION INTRAVENOUS at 09:03

## 2020-03-06 RX ADMIN — ENOXAPARIN SODIUM 40 MG: 100 INJECTION SUBCUTANEOUS at 05:03

## 2020-03-06 RX ADMIN — ONDANSETRON 4 MG: 2 INJECTION INTRAMUSCULAR; INTRAVENOUS at 08:03

## 2020-03-06 RX ADMIN — LORAZEPAM 1 MG: 2 INJECTION INTRAMUSCULAR; INTRAVENOUS at 08:03

## 2020-03-06 RX ADMIN — OXYCODONE HYDROCHLORIDE 5 MG: 5 TABLET ORAL at 03:03

## 2020-03-06 RX ADMIN — CEFTAROLINE FOSAMIL 600 MG: 600 POWDER, FOR SOLUTION INTRAVENOUS at 09:03

## 2020-03-06 RX ADMIN — CEFTAROLINE FOSAMIL 600 MG: 600 POWDER, FOR SOLUTION INTRAVENOUS at 03:03

## 2020-03-06 RX ADMIN — IBUPROFEN 400 MG: 400 TABLET ORAL at 03:03

## 2020-03-06 NOTE — PROGRESS NOTES
"Progress Note  Infectious Disease    Reason for Consult:  Probable endocarditis    02/29/2020.  Consult note. Soraya Gee is a  appearing 34 y.o. female with past medical history of hepatitis-C, IV drug use, in present for 3 years, released in March 2019, that is when she resumed IV drugs.  She comes in complaining of shaking chills, fever, myalgia, malaise, generalized weakness, 3 days duration after injecting some "bad drugs".  Initially she thought she had cotton fever but the symptoms persisted so she came to ER.  Patient also complains of shortness of breath, she was tachycardic and tachypneic on admission.  She has noticed swelling throughout.  Her albumin is low  Patient is a IV drug abuse and uses heroin twice a day, for a total daily dose of 1 g.  No sick contacts.  She is admitted in ICU for closer observation to make sure she does not withdrawal.  Patient is tachycardic, tachypneic, hurting all over, her history is limited by although this    03/01/2020.  Withdrawal symptoms are improved with Precedex drip; although she still feels like she is breathing fast and she is hurting all over.  She spiked fever of 101.3.  Blood cultures came positive for GPC.  No urinary complaints, no new skin or joint complaint, no cough or phlegm.  03/02/2020.  She spiked another fever of 102.3.  All cultures are turning up to be MRSA  CRP improved from 20/5 down to 13.  WBC is still elevated.  She gets confused on and off but then she interacts appropriately.    3/3:  Chart reviewed and discussed with Dr. Mccormack.  Persistently positive blood cultures with MRSA, vancomycin EDDIE 1.  Persistently hectic fever and septic appearance, improve hyperbilirubinemia, right upper quadrant tenderness, generalized aches and pains, sedated a bit on Precedex and methadone, neurologically intact.  3/4: still febrile through late last night. 3/2 cultures negative, but 3/3 cultures have GPC. CT chest/abd/pelvis reviewed and septic pulmonary " "emboli are present, but no spleen, muscle or paraspinous lesions noted.  She is currently up in the chair, off Precedex.  We discussed her diagnosis and she is willing to receive the treatment.  Does not have much appetite but she will drink the in [place].  She still hurts everywhere and does have some pleurisy.  3/5: temperature improving. Blood cultures from yesterday are negative so far. She looks much improved, clearer mentally, still very uncomfortable, somewhat anxious. Not remembering information given yesterday. Expresses desire to "get off " the drugs. Still has no appetite. Drinking a little ensure. No diarrhea. Still splinting from pleurisy. Discussed with her about the NAVA. Length of therapy.   3/6: afebrile. Blood cultures neg x 2 days. Very anxious/hyperventilating, about NAVA. D/w anesthesia. Reassured her. No nausea, vomiting. Ok for picc line.    Antibiotics (From admission, onward)    Start     Stop Route Frequency Ordered    03/03/20 1000  DAPTOmycin (CUBICIN) 645 mg in sodium chloride 0.9% IVPB      -- IV Every 24 hours (non-standard times) 03/03/20 0847    03/03/20 1000  ceftaroline (TEFLARO) 600 mg in dextrose 5 % 50 mL IVPB (ready to mix system)      -- IV Every 8 hours (non-standard times) 03/03/20 0847        Antifungals (From admission, onward)    None        Antivirals (From admission, onward)    None            EXAM & DIAGNOSTICS REVIEWED:   Vitals:     Temp:  [98.2 °F (36.8 °C)-99.3 °F (37.4 °C)]   Temp: 98.2 °F (36.8 °C) (03/06/20 0701)  Pulse: 94 (03/06/20 0701)  Resp: (!) 31 (03/06/20 0701)  BP: 134/89 (03/06/20 0701)  SpO2: 95 % (03/06/20 0701)    Intake/Output Summary (Last 24 hours) at 3/6/2020 0806  Last data filed at 3/5/2020 1800  Gross per 24 hour   Intake 400 ml   Output 1050 ml   Net -650 ml     Vitals:    03/06/20 0701   BP: 134/89   Pulse: 94   Resp: (!) 31   Temp: 98.2 °F (36.8 °C)         General:  Alert cooperative, tachypneic,very anxious about NAVA  Eyes:  Less icteric, " PERRL, EOMI, no scleral conjunctival lesions  ENT:  No ulcers, exudates, thrush, nares patent, edentulous/dentures.     Neck:  Supple,   Lungs: Tachypneic, clearer, but  Splinting less  Heart:  Regular, tachycardic common soft 1/6 systolic murmur  Abd:  Soft, ND, normal BS, less tender.  :  Vasquez out  Musc:  No focal joint swelling, synovitis, myositis, moves all extremities  Skin:  Marks from prior IV use.  Palms and soles are erythematous, without septic emboli, no subungual petechia   Wound:   Neuro:  speech fluent, face symmetric, moves all extremities, no focal weakness.   Psych:  Alert, very anxious,  cooperative   Lymphatic:     No cervical, supraclavicular, axillary, or inguinal nodes  Extrem:  No palpable edema but hands and feet are less edematous  No  , phlebitis, cellulitis, warm and well perfused  VAD:   midline left arm 3/5   Isolation:  Contact    Lines/Tubes/Drains:  Peripheral IV    General Labs reviewed:  Recent Labs   Lab 03/03/20  0452 03/04/20  0358 03/06/20  0350   WBC 16.22* 16.94* 21.65*   HGB 11.1* 9.3* 10.7*   HCT 32.6* 28.2* 31.6*   * 137* 288       Recent Labs   Lab 03/04/20  0358 03/05/20  0318 03/06/20  0350   * 132* 131*   K 3.7 4.3 4.4    104 104   CO2 22* 22* 19*   BUN 19 12 13   CREATININE 0.6 0.5 0.5   CALCIUM 7.9* 8.1* 8.5*   PROT 6.4 7.1 8.9*   BILITOT 3.9* 3.1* 3.5*   ALKPHOS 130 165* 188*   ALT 32 33 34   AST 30 36 31       Recent Labs   Lab 02/29/20  1515 03/02/20  0442 03/05/20  0318   CRP 24.33* 14.94* 10.18*             Micro:  Microbiology Results (last 7 days)     Procedure Component Value Units Date/Time    Blood culture [[id number]]  (Abnormal)  (Susceptibility) Collected:  03/02/20 0441    Order Status:  Completed Specimen:  Blood from Antecubital, Right Updated:  03/06/20 0744     Blood Culture, Routine Gram stain aer bottle: Gram positive cocci in clusters resembling Staph      Positive results previously called on [id number]      METHICILLIN  RESISTANT STAPHYLOCOCCUS AUREUS  Results called to and read back by: Dahlia Briscoe RN MICU  03/05/2020  14:01 by MERY      Blood culture [[id number]] Collected:  03/05/20 0317    Order Status:  Completed Specimen:  Blood Updated:  03/06/20 0632     Blood Culture, Routine No Growth to date      No Growth to date    Blood culture [[id number]] Collected:  03/04/20 0358    Order Status:  Completed Specimen:  Blood Updated:  03/06/20 0632     Blood Culture, Routine No Growth to date      No Growth to date      No Growth to date    Blood culture [[id number]] Collected:  03/06/20 0350    Order Status:  Sent Specimen:  Blood Updated:  03/06/20 0430    Blood culture [[id number]]  (Abnormal) Collected:  03/03/20 0502    Order Status:  Completed Specimen:  Blood Updated:  03/04/20 1119     Blood Culture, Routine Gram stain aer bottle: Gram positive cocci       Results called to and read back by: Israel Ledesma RN MICU.      METHICILLIN RESISTANT STAPHYLOCOCCUS AUREUS  For susceptibility see order #[id number]      Blood culture [[id number]]  (Abnormal) Collected:  02/29/20 1605    Order Status:  Completed Specimen:  Blood from Peripheral, Upper Arm, Right Updated:  03/03/20 0617     Blood Culture, Routine Gram stain aer bottle: Gram positive cocci      Gram stain porfirio bottle: Gram positive cocci      Positive results previously called      METHICILLIN RESISTANT STAPHYLOCOCCUS AUREUS  For susceptibility see order #[id number]      Blood culture x two cultures. Draw prior to antibiotics. [[id number]]  (Abnormal) Collected:  02/29/20 1517    Order Status:  Completed Specimen:  Blood from Peripheral, Forearm, Right Updated:  03/03/20 0616     Blood Culture, Routine Gram stain aer bottle: Gram positive cocci      Gram stain porfirio bottle: Gram positive cocci      Results called to and read back by:Xenia Sargent RN-2BICU;  03/01/2020        02:44 CJD      METHICILLIN RESISTANT STAPHYLOCOCCUS AUREUS  For susceptibility see order #[id number]       Narrative:       Aerobic and anaerobic    Blood culture [[id number]]  (Abnormal) Collected:  03/01/20 0521    Order Status:  Completed Specimen:  Blood Updated:  03/03/20 0616     Blood Culture, Routine Gram stain aer bottle: Gram positive cocci      Results called to and read back by:Tami Posadas RN-2B[facility];        03/02/2020  02:01 CJD      METHICILLIN RESISTANT STAPHYLOCOCCUS AUREUS  For susceptibility see order #[id number]      Blood culture x two cultures. Draw prior to antibiotics. [[id number]]  (Abnormal)  (Susceptibility) Collected:  02/29/20 1542    Order Status:  Completed Specimen:  Blood from Peripheral, Forearm, Right Updated:  03/03/20 0614     Blood Culture, Routine Gram stain aer bottle: Gram positive cocci      Gram stain porfirio bottle: Gram positive cocci      Results called to and read back by:Xenia Sargent RN-2B[facility];  03/01/2020        02:45 CJD      METHICILLIN RESISTANT STAPHYLOCOCCUS AUREUS  Results called to and read back by:Javi Gordon RN  03/02/2020  11:05   JBM  JBM      Narrative:       Aerobic and anaerobic    Urine culture [[id number]] Collected:  02/29/20 1522    Order Status:  Completed Specimen:  Urine Updated:  03/02/20 0652     Urine Culture, Routine No growth    Narrative:       Preferred Collection Type->Urine, Clean Catch  Specimen Source->Urine    Blood culture [[id number]]     Order Status:  Canceled Specimen:  Blood         Imaging Reviewed:  Chest x-rays    CXR Mild central hilar bronchovascular crowding, possibly secondary to low lung volumes and atelectasis or very mild edema, or atypical infection/bronchitis as above    US1.  Sludge seen in the gallbladder, without findings to specifically suggest acute cholecystitis (noting borderline gallbladder wall thickening).  2.  Rounded echogenic focus in the right lobe of the liver, with imaging features of an hepatic hemangioma, consider ultrasound follow-up in 6 months to document stability.  3.  Numerous small periportal lymph  nodes, likely reactive/inflammatory in nature.  Consider correlation with liver function tests and hepatitis serologies.    CT chest/abd/pelvis 3/3  Bilateral diffuse nodular predominantly pleural based opacities without cavitation.  Findings are most consistent with septic emboli.  Multifocal pneumonia could not be excluded.  There is bibasilar airspace disease which may represent atelectasis or infiltrates with small bilateral pleural effusions  Mild hepatosplenomegaly.  The hyperechoic mass in the right lobe of the liver is not seen on CT and most likely represents hemangioma  No acute abnormality within the abdomen or pelvis    Cardiology:    Transthoracic echo 3/1  Left Ventricle Normal ejection fraction at 60%. Normal left ventricular cavity size. Normal wall thickness observed. Normal left ventricular diastolic function. Normal left atrial pressure. No wall motion abnormalities.   Right Ventricle Normal cavity size, wall thickness and systolic function. Wall motion normal.   Left Atrium The left atrium is normal.   Right Atrium There is normal right atrial size.   Aortic Valve The aortic valve appears structurally normal. There is no stenosis. No regurgitation. There is normal leaflet mobility.The LVOT diameter is 2.02 cm.   Mitral Valve The mitral valve appears structurally normal. There is normal leaflet mobility. No regurgitation.   Tricuspid Valve The tricuspid valve appears structurally normal. No stenosis. Trace regurgitation. There is normal leaflet mobility. The estimated PA systolic pressure is 26 mmHg.   Pulmonic Valve The pulmonic valve was not well visualized. No stenosis. No regurgitation.   IVC/SVC Normal central venous pressure (3 mm Hg).   Ascending Aorta The aortic root and ascending aorta are normal in size.   Pericardium No pericardial effusion.       IMPRESSION & PLAN     Persistent MRSA bacteremia(2/29, 3/1, 3/2 3/3 ) 3/4 neg so far.  Presumptive endocarditis in a patient with history of  IV drug use. Septic pulmonary emboli    Elevated markers of inflammation including procal, CRP, ESR. improving  TELLY.  Improved.   Epigastric right upper quadrant discomfort and generalized abdominal discomfort.  Jaundice Transaminitis, heavy opiate use, H/o Hep C, untreated, ammonia normal:  Bilirubin improved  IVDU. heroin  Anemia   Protein malnutrition.  Hypoalbuminemia =  2.6  anxiety  This patient is high risk for life-threatening deterioration and death secondary to above comorbidities and need for IV treatment.      Recommendations:  Continue daptomycin 8 mg per kg plus ceftaroline (shown to clear bacteremia more quickly)  Ok fo rpicc line     NAVA t his am    Prognosis is improved

## 2020-03-06 NOTE — SUBJECTIVE & OBJECTIVE
Interval History: Pt has few pain complaints and is much more interactive with the examiner.    Review of Systems   Constitutional: Positive for fatigue.   HENT: Negative.    Eyes: Negative.    Respiratory: Negative.    Cardiovascular: Positive for chest pain.   Gastrointestinal: Negative.    Genitourinary: Negative.    Musculoskeletal: Negative.    Neurological: Negative.    Hematological: Negative.    Psychiatric/Behavioral: Negative.      Objective:     Vital Signs (Most Recent):  Temp: 99.3 °F (37.4 °C) (03/05/20 1600)  Pulse: 79 (03/05/20 1800)  Resp: (!) 31 (03/05/20 1800)  BP: 122/72 (03/05/20 1800)  SpO2: 96 % (03/05/20 1800) Vital Signs (24h Range):  Temp:  [98.8 °F (37.1 °C)-99.7 °F (37.6 °C)] 99.3 °F (37.4 °C)  Pulse:  [] 79  Resp:  [21-54] 31  SpO2:  [90 %-98 %] 96 %  BP: (111-165)/() 122/72     Weight: 80.7 kg (177 lb 14.6 oz)  Body mass index is 27.05 kg/m².    Intake/Output Summary (Last 24 hours) at 3/5/2020 1939  Last data filed at 3/5/2020 1800  Gross per 24 hour   Intake 900 ml   Output 2550 ml   Net -1650 ml      Physical Exam   Constitutional: She is oriented to person, place, and time. She appears distressed.   HENT:   Head: Normocephalic and atraumatic.   Eyes: Pupils are equal, round, and reactive to light. Conjunctivae and EOM are normal.   Neck: Normal range of motion. Neck supple.   Cardiovascular: Normal rate and regular rhythm.   Murmur heard.  Pulmonary/Chest: Effort normal and breath sounds normal.   Abdominal: Soft. Bowel sounds are normal.   Musculoskeletal: Normal range of motion.   Neurological: She is alert and oriented to person, place, and time.   Skin: Skin is warm and dry. No rash noted. She is not diaphoretic.   Psychiatric: She has a normal mood and affect.       Significant Labs:   Blood Culture:   Recent Labs   Lab 03/04/20  0358 03/05/20  0317   LABBLOO No Growth to date  No Growth to date No Growth to date     BMP:   Recent Labs   Lab 03/05/20  0318   GLU 98    *   K 4.3      CO2 22*   BUN 12   CREATININE 0.5   CALCIUM 8.1*   MG 1.6     CBC:   Recent Labs   Lab 03/04/20 0358   WBC 16.94*   HGB 9.3*   HCT 28.2*   *     CMP:   Recent Labs   Lab 03/04/20 0358 03/05/20 0318   * 132*   K 3.7 4.3    104   CO2 22* 22*    98   BUN 19 12   CREATININE 0.6 0.5   CALCIUM 7.9* 8.1*   PROT 6.4 7.1   ALBUMIN 1.9* 2.1*   BILITOT 3.9* 3.1*   ALKPHOS 130 165*   AST 30 36   ALT 32 33   ANIONGAP 8 6*   EGFRNONAA >60.0 >60.0     Cardiac Markers: No results for input(s): CKMB, MYOGLOBIN, BNP, TROPISTAT in the last 48 hours.  Lactic Acid: No results for input(s): LACTATE in the last 48 hours.  Pathology Results  (Last 10 years)    None        Troponin: No results for input(s): TROPONINI in the last 48 hours.  TSH: No results for input(s): TSH in the last 4320 hours.    Significant Imaging: I have reviewed all pertinent imaging results/findings within the past 24 hours.

## 2020-03-06 NOTE — NURSING TRANSFER
Nursing Transfer Note      3/6/2020     Transfer To: 1124 From 2207    Transfer via wheelchair    Transfer with  to O2, cardiac monitoring    Transported by Tamera elliott RN    Medicines sent: none    Chart send with patient: Yes    Notified: family notified by patient    Patient reassessed at: 05/06/2020 1752    Upon arrival to floor: cardiac monitor applied, patient oriented to room, call bell in reach and bed in lowest position    Cheryle RN at bedside

## 2020-03-06 NOTE — CARE UPDATE
03/05/20 0256   Patient Assessment/Suction   Level of Consciousness (AVPU) alert   All Lung Fields Breath Sounds clear   PRE-TX-O2   O2 Device (Oxygen Therapy) nasal cannula   $ Is the patient on Low Flow Oxygen? Yes   Flow (L/min) 2   SpO2 96 %   Pulse Oximetry Type Continuous   $ Pulse Oximetry - Multiple Charge Pulse Oximetry - Multiple   Pulse 89   Resp (!) 27   Respiratory Evaluation   $ Care Plan Tech Time 15 min   Evaluation For Re-Eval 3 day

## 2020-03-06 NOTE — ANESTHESIA POSTPROCEDURE EVALUATION
Anesthesia Post Evaluation    Patient: Soraya Gee    Procedure(s) Performed: * No procedures listed *    [facility] Anesthesia Post Op Evaluation      Vitals Value Taken Time   /80 3/6/2020  9:57 AM   Temp 36.8 °C (98.2 °F) 3/6/2020  7:01 AM   Pulse 108 3/6/2020  9:59 AM   Resp 36 3/6/2020  9:59 AM   SpO2 98 % 3/6/2020  9:59 AM   Vitals shown include unvalidated device data.      No case tracking events are documented in the log.      Pain/Jennifer Score: Pain Rating Prior to Med Admin: 8 (3/6/2020  8:07 AM)  Pain Rating Post Med Admin: 2 (3/6/2020  9:01 AM)

## 2020-03-06 NOTE — TRANSFER OF CARE
"Anesthesia Transfer of Care Note    Patient: Soraya Gee    Procedure(s) Performed: * No procedures listed *    Patient location: ICU    Anesthesia Type: MAC    Post pain: adequate analgesia    Post assessment: no apparent anesthetic complications    Post vital signs: stable    Level of consciousness: awake, alert and oriented    Nausea/Vomiting: no nausea/vomiting    Complications: none    Transfer of care protocol was followed      Last vitals:   Visit Vitals  /75   Pulse 101   Temp 36.8 °C (98.2 °F) (Oral)   Resp 18   Ht 5' 8" (1.727 m)   Wt 80.7 kg (177 lb 14.6 oz)   LMP  (LMP Unknown)   SpO2 96%   Breastfeeding? No   BMI 27.05 kg/m²     "

## 2020-03-06 NOTE — PROGRESS NOTES
Count includes the [facility] Medicine  Progress Note    Patient Name: Soraya Gee  MRN: [id number]  Patient Class: IP- Inpatient   Admission Date: 2/29/2020  Length of Stay: 5 days  Attending Physician: Og Thompson MD  Primary Care Provider: Jus Griggs Iii, MD        Subjective:     Principal Problem:Acute endocarditis        HPI:  34-year-old patient getting admitted with suspected sepsis from acute infective endocarditis  Patient is a IV drug abuse and uses heroin on a daily basis  For the past 2 days she has been having fever/myalgia/malaise and generalized weakness all over the body  Later she started having shortness of breath and patient did notice that her legs are getting more swollen  Because of the persistence of symptoms she came to the emergency room and got admitted  Patient initially thought she might have got cotton fever  Per patient if she will not take heroin every day she will develop severe withdrawal symptoms  No other issues.  She lives alone but has got a boyfriend who lives very nearby       Overview/Hospital Course:  Patient was admitted with most likely bacterial endocarditis  She has daily urine IV drug user.  Methadone started yesterday and increased the dose further  Still having persistent fevers, blood culture with persistent  MRSA  Seen and examined the patient she is awake alert oriented and able to answer all the questions, off Precedex  Patient is going to need NAVA    3/3: Ms Gee is sedated on precedex and methadone. Pt arouses but has limited response to questions but she did move all extremities,    3/4: Pt is awake and alert with c/o 8/10 flank pain. Pt has 4/10 pain with oxycontin for break thru pain. She has no neurologic complaints. I have discussed the case with Dr Rodriguez and appreciate her imput. Pt nurse and I discussed the need for rehab Re opiate dependency.    3/5: Pt states that the methadone really helps her pain and opiate cravings. She has taken  only 1 oxycodone today. Pt with seek treatment at a methadone clinic post discharge. Her side pain has improved from an 8/10 to < 4/10. Pt has improved enough to be sent to telemetry    Interval History: Pt has few pain complaints and is much more interactive with the examiner.    Review of Systems   Constitutional: Positive for fatigue.   HENT: Negative.    Eyes: Negative.    Respiratory: Negative.    Cardiovascular: Positive for chest pain.   Gastrointestinal: Negative.    Genitourinary: Negative.    Musculoskeletal: Negative.    Neurological: Negative.    Hematological: Negative.    Psychiatric/Behavioral: Negative.      Objective:     Vital Signs (Most Recent):  Temp: 99.3 °F (37.4 °C) (03/05/20 1600)  Pulse: 79 (03/05/20 1800)  Resp: (!) 31 (03/05/20 1800)  BP: 122/72 (03/05/20 1800)  SpO2: 96 % (03/05/20 1800) Vital Signs (24h Range):  Temp:  [98.8 °F (37.1 °C)-99.7 °F (37.6 °C)] 99.3 °F (37.4 °C)  Pulse:  [] 79  Resp:  [21-54] 31  SpO2:  [90 %-98 %] 96 %  BP: (111-165)/() 122/72     Weight: 80.7 kg (177 lb 14.6 oz)  Body mass index is 27.05 kg/m².    Intake/Output Summary (Last 24 hours) at 3/5/2020 1939  Last data filed at 3/5/2020 1800  Gross per 24 hour   Intake 900 ml   Output 2550 ml   Net -1650 ml      Physical Exam   Constitutional: She is oriented to person, place, and time. She appears distressed.   HENT:   Head: Normocephalic and atraumatic.   Eyes: Pupils are equal, round, and reactive to light. Conjunctivae and EOM are normal.   Neck: Normal range of motion. Neck supple.   Cardiovascular: Normal rate and regular rhythm.   Murmur heard.  Pulmonary/Chest: Effort normal and breath sounds normal.   Abdominal: Soft. Bowel sounds are normal.   Musculoskeletal: Normal range of motion.   Neurological: She is alert and oriented to person, place, and time.   Skin: Skin is warm and dry. No rash noted. She is not diaphoretic.   Psychiatric: She has a normal mood and affect.       Significant Labs:    Blood Culture:   Recent Labs   Lab 03/04/20 0358 03/05/20 0317   LABBLOO No Growth to date  No Growth to date No Growth to date     BMP:   Recent Labs   Lab 03/05/20 0318   GLU 98   *   K 4.3      CO2 22*   BUN 12   CREATININE 0.5   CALCIUM 8.1*   MG 1.6     CBC:   Recent Labs   Lab 03/04/20 0358   WBC 16.94*   HGB 9.3*   HCT 28.2*   *     CMP:   Recent Labs   Lab 03/04/20 0358 03/05/20 0318   * 132*   K 3.7 4.3    104   CO2 22* 22*    98   BUN 19 12   CREATININE 0.6 0.5   CALCIUM 7.9* 8.1*   PROT 6.4 7.1   ALBUMIN 1.9* 2.1*   BILITOT 3.9* 3.1*   ALKPHOS 130 165*   AST 30 36   ALT 32 33   ANIONGAP 8 6*   EGFRNONAA >60.0 >60.0     Cardiac Markers: No results for input(s): CKMB, MYOGLOBIN, BNP, TROPISTAT in the last 48 hours.  Lactic Acid: No results for input(s): LACTATE in the last 48 hours.  Pathology Results  (Last 10 years)    None        Troponin: No results for input(s): TROPONINI in the last 48 hours.  TSH: No results for input(s): TSH in the last 4320 hours.    Significant Imaging: I have reviewed all pertinent imaging results/findings within the past 24 hours.      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  Echo was reading as normal but as per cardiologist not able to see some area properly.      Plan   Continue IV antibiotics   she will need NAVA when she is more awake and alert . She is off precedix  Follow card and ID   PICC line placement       TELLY (acute kidney injury)    resolved      Hepatitis C  History of hepatitis C  Right now bilirubin level is on the higher range along with slight elevation of hepatic panels    Plan    ultrasound of the abdomen with hepatic hemangioma, ultrasound follow-up in 6 months   AFP ordered      IVDU (intravenous drug user)  Patient uses heroin on daily basis  Watch closely/monitor for  withdrawal symptoms  Methadone started          VTE Risk Mitigation (From admission, onward)         Ordered     enoxaparin injection 40 mg  Daily      02/29/20 1725     IP VTE HIGH RISK PATIENT  Once      02/29/20 1725                      Rafa Galeano MD  Department of Hospital Medicine   [facility]

## 2020-03-06 NOTE — INTERVAL H&P NOTE
The patient has been examined and the H&P has been reviewed:    I concur with the findings and changes have been noted since the H&P was written: Patient had MRSA bacteremia. She is currently more alert and is stable to have a NAVA done.         Active Hospital Problems    Diagnosis  POA    *Acute endocarditis [I33.9]  Yes    Endocarditis [I38]  Yes    IVDU (intravenous drug user) [F19.90]  Yes    Hepatitis C [B19.20]  Yes    TELLY (acute kidney injury) [N17.9]  Yes      Resolved Hospital Problems   No resolved problems to display.

## 2020-03-07 LAB
ALBUMIN SERPL BCP-MCNC: 2.4 G/DL (ref 3.5–5.2)
ALP SERPL-CCNC: 141 U/L (ref 55–135)
ALT SERPL W/O P-5'-P-CCNC: 28 U/L (ref 10–44)
ANION GAP SERPL CALC-SCNC: 4 MMOL/L (ref 8–16)
AST SERPL-CCNC: 23 U/L (ref 10–40)
BASOPHILS # BLD AUTO: 0.07 K/UL (ref 0–0.2)
BASOPHILS NFR BLD: 0.5 % (ref 0–1.9)
BILIRUB SERPL-MCNC: 2.8 MG/DL (ref 0.1–1)
BSA FOR ECHO PROCEDURE: 2.01 M2
BUN SERPL-MCNC: 15 MG/DL (ref 6–20)
CALCIUM SERPL-MCNC: 8.3 MG/DL (ref 8.7–10.5)
CHLORIDE SERPL-SCNC: 104 MMOL/L (ref 95–110)
CO2 SERPL-SCNC: 24 MMOL/L (ref 23–29)
CREAT SERPL-MCNC: 0.6 MG/DL (ref 0.5–1.4)
DIFFERENTIAL METHOD: ABNORMAL
EOSINOPHIL # BLD AUTO: 0.3 K/UL (ref 0–0.5)
EOSINOPHIL NFR BLD: 2 % (ref 0–8)
ERYTHROCYTE [DISTWIDTH] IN BLOOD BY AUTOMATED COUNT: 14.6 % (ref 11.5–14.5)
EST. GFR  (AFRICAN AMERICAN): >60 ML/MIN/1.73 M^2
EST. GFR  (NON AFRICAN AMERICAN): >60 ML/MIN/1.73 M^2
GLUCOSE SERPL-MCNC: 98 MG/DL (ref 70–110)
HCT VFR BLD AUTO: 26.3 % (ref 37–48.5)
HGB BLD-MCNC: 8.6 G/DL (ref 12–16)
IMM GRANULOCYTES # BLD AUTO: 0.6 K/UL (ref 0–0.04)
IMM GRANULOCYTES NFR BLD AUTO: 4.1 % (ref 0–0.5)
LYMPHOCYTES # BLD AUTO: 2.1 K/UL (ref 1–4.8)
LYMPHOCYTES NFR BLD: 14.4 % (ref 18–48)
MAGNESIUM SERPL-MCNC: 1.8 MG/DL (ref 1.6–2.6)
MCH RBC QN AUTO: 30.9 PG (ref 27–31)
MCHC RBC AUTO-ENTMCNC: 32.7 G/DL (ref 32–36)
MCV RBC AUTO: 95 FL (ref 82–98)
MONOCYTES # BLD AUTO: 1 K/UL (ref 0.3–1)
MONOCYTES NFR BLD: 7 % (ref 4–15)
NEUTROPHILS # BLD AUTO: 10.7 K/UL (ref 1.8–7.7)
NEUTROPHILS NFR BLD: 72 % (ref 38–73)
NRBC BLD-RTO: 0 /100 WBC
PLATELET # BLD AUTO: 289 K/UL (ref 150–350)
PMV BLD AUTO: 10.5 FL (ref 9.2–12.9)
POTASSIUM SERPL-SCNC: 4.1 MMOL/L (ref 3.5–5.1)
PROT SERPL-MCNC: 7.9 G/DL (ref 6–8.4)
RBC # BLD AUTO: 2.78 M/UL (ref 4–5.4)
SODIUM SERPL-SCNC: 132 MMOL/L (ref 136–145)
WBC # BLD AUTO: 14.81 K/UL (ref 3.9–12.7)

## 2020-03-07 PROCEDURE — 63600175 PHARM REV CODE 636 W HCPCS: Performed by: INTERNAL MEDICINE

## 2020-03-07 PROCEDURE — 27000221 HC OXYGEN, UP TO 24 HOURS

## 2020-03-07 PROCEDURE — 94761 N-INVAS EAR/PLS OXIMETRY MLT: CPT

## 2020-03-07 PROCEDURE — 99900035 HC TECH TIME PER 15 MIN (STAT)

## 2020-03-07 PROCEDURE — 87040 BLOOD CULTURE FOR BACTERIA: CPT

## 2020-03-07 PROCEDURE — 83735 ASSAY OF MAGNESIUM: CPT

## 2020-03-07 PROCEDURE — 25000003 PHARM REV CODE 250: Performed by: INTERNAL MEDICINE

## 2020-03-07 PROCEDURE — 80053 COMPREHEN METABOLIC PANEL: CPT

## 2020-03-07 PROCEDURE — 12000002 HC ACUTE/MED SURGE SEMI-PRIVATE ROOM

## 2020-03-07 PROCEDURE — 85025 COMPLETE CBC W/AUTO DIFF WBC: CPT

## 2020-03-07 PROCEDURE — 36415 COLL VENOUS BLD VENIPUNCTURE: CPT

## 2020-03-07 RX ADMIN — CEFTAROLINE FOSAMIL 600 MG: 600 POWDER, FOR SOLUTION INTRAVENOUS at 09:03

## 2020-03-07 RX ADMIN — DAPTOMYCIN 645 MG: 500 INJECTION, POWDER, LYOPHILIZED, FOR SOLUTION INTRAVENOUS at 12:03

## 2020-03-07 RX ADMIN — CEFTAROLINE FOSAMIL 600 MG: 600 POWDER, FOR SOLUTION INTRAVENOUS at 11:03

## 2020-03-07 RX ADMIN — CEFTAROLINE FOSAMIL 600 MG: 600 POWDER, FOR SOLUTION INTRAVENOUS at 04:03

## 2020-03-07 RX ADMIN — ENOXAPARIN SODIUM 40 MG: 100 INJECTION SUBCUTANEOUS at 05:03

## 2020-03-07 RX ADMIN — METHADONE HYDROCHLORIDE 30 MG: 10 TABLET ORAL at 08:03

## 2020-03-07 RX ADMIN — OXYCODONE HYDROCHLORIDE 5 MG: 5 TABLET ORAL at 09:03

## 2020-03-07 NOTE — RESPIRATORY THERAPY
03/07/20 0040   Patient Assessment/Suction   Level of Consciousness (AVPU) responds to voice   Respiratory Effort Normal;Unlabored   Expansion/Accessory Muscles/Retractions expansion symmetric;no retractions;no use of accessory muscles   All Lung Fields Breath Sounds diminished   Rhythm/Pattern, Respiratory no shortness of breath reported;pattern regular;unlabored   PRE-TX-O2   O2 Device (Oxygen Therapy) nasal cannula   $ Is the patient on Low Flow Oxygen? Yes   SpO2 97 %   Pulse Oximetry Type Intermittent   $ Pulse Oximetry - Multiple Charge Pulse Oximetry - Multiple   Pulse 90   Resp 14   Respiratory Evaluation   $ Care Plan Tech Time 15 min

## 2020-03-07 NOTE — SUBJECTIVE & OBJECTIVE
Interval History: Pt would like to attend a methadone clinic. She will consent to LTAC therapy    Review of Systems   Constitutional: Negative.  Negative for chills, fatigue and fever.   HENT: Negative.    Eyes: Negative.    Respiratory: Negative.    Cardiovascular: Positive for chest pain.   Gastrointestinal: Negative.    Genitourinary: Negative.    Musculoskeletal: Negative.    Neurological: Negative.    Psychiatric/Behavioral: Negative.      Objective:     Vital Signs (Most Recent):  Temp: 98.7 °F (37.1 °C) (03/06/20 1649)  Pulse: 104 (03/06/20 1649)  Resp: (!) 23 (03/06/20 1649)  BP: 105/68 (03/06/20 1649)  SpO2: 97 % (03/06/20 1649) Vital Signs (24h Range):  Temp:  [98.2 °F (36.8 °C)-99.8 °F (37.7 °C)] 98.7 °F (37.1 °C)  Pulse:  [] 104  Resp:  [18-92] 23  SpO2:  [90 %-98 %] 97 %  BP: (105-137)/(57-89) 105/68     Weight: 80.7 kg (177 lb 14.6 oz)  Body mass index is 27.05 kg/m².    Intake/Output Summary (Last 24 hours) at 3/6/2020 2059  Last data filed at 3/6/2020 1501  Gross per 24 hour   Intake 1350 ml   Output 450 ml   Net 900 ml      Physical Exam   HENT:   Head: Normocephalic and atraumatic.   Eyes: Pupils are equal, round, and reactive to light. Conjunctivae and EOM are normal.   Neck: Normal range of motion. Neck supple.   Cardiovascular: Normal rate and regular rhythm. Exam reveals gallop.   Murmur heard.  Pulmonary/Chest: Effort normal and breath sounds normal.   Abdominal: Soft. Bowel sounds are normal.   Musculoskeletal: Normal range of motion.   Neurological: She is alert.   Skin: No rash noted. No erythema.   Psychiatric: She has a normal mood and affect.       Significant Labs:   Blood Culture:   Recent Labs   Lab 03/05/20 0317 03/06/20  0350   LABBLOO No Growth to date  No Growth to date No Growth to date     BMP:   Recent Labs   Lab 03/06/20  0350      *   K 4.4      CO2 19*   BUN 13   CREATININE 0.5   CALCIUM 8.5*   MG 1.7     CBC:   Recent Labs   Lab 03/06/20  0350    WBC 21.65*   HGB 10.7*   HCT 31.6*        CMP:   Recent Labs   Lab 03/05/20  0318 03/06/20  0350   * 131*   K 4.3 4.4    104   CO2 22* 19*   GLU 98 102   BUN 12 13   CREATININE 0.5 0.5   CALCIUM 8.1* 8.5*   PROT 7.1 8.9*   ALBUMIN 2.1* 2.6*   BILITOT 3.1* 3.5*   ALKPHOS 165* 188*   AST 36 31   ALT 33 34   ANIONGAP 6* 8   EGFRNONAA >60.0 >60.0     POCT Glucose: No results for input(s): POCTGLUCOSE in the last 48 hours.    Significant Imaging: I have reviewed all pertinent imaging results/findings within the past 24 hours.

## 2020-03-07 NOTE — ASSESSMENT & PLAN NOTE
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  Echo was reading as normal but as per cardiologist not able to see some area properly.      Plan   Continue IV antibiotics   she will need NAVA when she is more awake and alert . She is off precedix  Follow card and ID   PICC line placement   3/6: Pt is stable, will consent to LTAC therapy

## 2020-03-07 NOTE — SUBJECTIVE & OBJECTIVE
Interval History: Pt feels much better overall    Review of Systems   Constitutional: Negative for chills, fatigue and fever.   Respiratory: Negative for cough, chest tightness, shortness of breath and wheezing.    Cardiovascular: Negative for chest pain.   Gastrointestinal: Negative.    Neurological: Negative.      Objective:     Vital Signs (Most Recent):  Temp: 99.3 °F (37.4 °C) (03/07/20 0701)  Pulse: 97 (03/07/20 0701)  Resp: 20 (03/07/20 0701)  BP: 126/76 (03/07/20 0701)  SpO2: 99 % (03/07/20 0701) Vital Signs (24h Range):  Temp:  [98.7 °F (37.1 °C)-99.9 °F (37.7 °C)] 99.3 °F (37.4 °C)  Pulse:  [] 97  Resp:  [14-92] 20  SpO2:  [91 %-99 %] 99 %  BP: (105-133)/(57-84) 126/76     Weight: 80.7 kg (177 lb 14.6 oz)  Body mass index is 27.05 kg/m².    Intake/Output Summary (Last 24 hours) at 3/7/2020 0926  Last data filed at 3/7/2020 0800  Gross per 24 hour   Intake 1600 ml   Output 450 ml   Net 1150 ml      Physical Exam   Constitutional: She is oriented to person, place, and time.   Eyes: Pupils are equal, round, and reactive to light. EOM are normal.   Neck: Normal range of motion. Neck supple.   Cardiovascular: Normal rate and regular rhythm.   Pulmonary/Chest: Effort normal and breath sounds normal.   Abdominal: Soft. Bowel sounds are normal.   Neurological: She is oriented to person, place, and time.   Psychiatric: She has a normal mood and affect.       Significant Labs:   Blood Culture:   Recent Labs   Lab 03/06/20  0350   LABBLOO Gram stain aer bottle: Gram positive cocci  Results called to and read back by:Darius Olvera RN-[address] [place] [address];    03/07/2020  05:25 CJD     BMP:   Recent Labs   Lab 03/07/20  0541   GLU 98   *   K 4.1      CO2 24   BUN 15   CREATININE 0.6   CALCIUM 8.3*   MG 1.8     CBC:   Recent Labs   Lab 03/06/20  0350 03/07/20  0541   WBC 21.65* 14.81*   HGB 10.7* 8.6*   HCT 31.6* 26.3*    289     CMP:   Recent Labs   Lab 03/06/20 0350 03/07/20  0541   * 132*   K  4.4 4.1    104   CO2 19* 24    98   BUN 13 15   CREATININE 0.5 0.6   CALCIUM 8.5* 8.3*   PROT 8.9* 7.9   ALBUMIN 2.6* 2.4*   BILITOT 3.5* 2.8*   ALKPHOS 188* 141*   AST 31 23   ALT 34 28   ANIONGAP 8 4*   EGFRNONAA >60.0 >60.0       Significant Imaging: I have reviewed all pertinent imaging results/findings within the past 24 hours.

## 2020-03-07 NOTE — ASSESSMENT & PLAN NOTE
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  Echo was reading as normal but as per cardiologist not able to see some area properly.      Plan   Continue IV antibiotics   she will need NAVA when she is more awake and alert . She is off precedix  Follow card and ID   PICC line placement   3/6: Pt is stable, will consent to LTAC therapy    3/7: continue IV antibiotics  LTAC placement if possible

## 2020-03-07 NOTE — PLAN OF CARE
Had a 5 beat and 8 beat run of v.tach while going to bathroom. Patient was asymptomatic and unaware. No diaphoresis, pallor, nausea or vomiting or chest tightness. Notified hospitalist on call. Pain is otherwise well controlled, slept well and safety maintained. No other issues or events.

## 2020-03-07 NOTE — PROGRESS NOTES
[facility]  Progress Note    Patient Name: Soraya Gee  MRN: [id number]  Patient Class: IP- Inpatient   Admission Date: 2/29/2020  Length of Stay: 7 days  Attending Physician: Og Thompson MD  Primary Care Provider: Jus Griggs Iii, MD        Subjective:     Principal Problem:Acute endocarditis        HPI:  34-year-old patient getting admitted with suspected sepsis from acute infective endocarditis  Patient is a IV drug abuse and uses heroin on a daily basis  For the past 2 days she has been having fever/myalgia/malaise and generalized weakness all over the body  Later she started having shortness of breath and patient did notice that her legs are getting more swollen  Because of the persistence of symptoms she came to the emergency room and got admitted  Patient initially thought she might have got cotton fever  Per patient if she will not take heroin every day she will develop severe withdrawal symptoms  No other issues.  She lives alone but has got a boyfriend who lives very nearby       Overview/Hospital Course:  Patient was admitted with most likely bacterial endocarditis  She has daily urine IV drug user.  Methadone started yesterday and increased the dose further  Still having persistent fevers, blood culture with persistent  MRSA  Seen and examined the patient she is awake alert oriented and able to answer all the questions, off Precedex  Patient is going to need NAVA    3/3: Ms Gee is sedated on precedex and methadone. Pt arouses but has limited response to questions but she did move all extremities,    3/4: Pt is awake and alert with c/o 8/10 flank pain. Pt has 4/10 pain with oxycontin for break thru pain. She has no neurologic complaints. I have discussed the case with Dr Rodriguez and appreciate her imput. Pt nurse and I discussed the need for rehab Re opiate dependency.    3/5: Pt states that the methadone really helps her pain and opiate cravings. She has taken  only 1 oxycodone today. Pt with seek treatment at a methadone clinic post discharge. Her side pain has improved from an 8/10 to < 4/10. Pt has improved enough to be sent to telemetry    3/6: Ms Elizondo has no complaints. Case discussed with Dr Rodriguez may go to an LTiiAC for further therapy. I will consult  for the above    3/7: I am feeling better, my side pain is low( 4/10 at worst) . Pt agrees to LTAC placement    Interval History: Pt feels much better overall    Review of Systems   Constitutional: Negative for chills, fatigue and fever.   Respiratory: Negative for cough, chest tightness, shortness of breath and wheezing.    Cardiovascular: Negative for chest pain.   Gastrointestinal: Negative.    Neurological: Negative.      Objective:     Vital Signs (Most Recent):  Temp: 99.3 °F (37.4 °C) (03/07/20 0701)  Pulse: 97 (03/07/20 0701)  Resp: 20 (03/07/20 0701)  BP: 126/76 (03/07/20 0701)  SpO2: 99 % (03/07/20 0701) Vital Signs (24h Range):  Temp:  [98.7 °F (37.1 °C)-99.9 °F (37.7 °C)] 99.3 °F (37.4 °C)  Pulse:  [] 97  Resp:  [14-92] 20  SpO2:  [91 %-99 %] 99 %  BP: (105-133)/(57-84) 126/76     Weight: 80.7 kg (177 lb 14.6 oz)  Body mass index is 27.05 kg/m².    Intake/Output Summary (Last 24 hours) at 3/7/2020 0926  Last data filed at 3/7/2020 0800  Gross per 24 hour   Intake 1600 ml   Output 450 ml   Net 1150 ml      Physical Exam   Constitutional: She is oriented to person, place, and time.   Eyes: Pupils are equal, round, and reactive to light. EOM are normal.   Neck: Normal range of motion. Neck supple.   Cardiovascular: Normal rate and regular rhythm.   Pulmonary/Chest: Effort normal and breath sounds normal.   Abdominal: Soft. Bowel sounds are normal.   Neurological: She is oriented to person, place, and time.   Psychiatric: She has a normal mood and affect.       Significant Labs:   Blood Culture:   Recent Labs   Lab 03/06/20  0350   LABBLOO Gram stain aer bottle: Gram positive cocci   Results called to and read back by:Darius Olvera [id number];    03/07/2020  05:25 CJD     BMP:   Recent Labs   Lab 03/07/20  0541   GLU 98   *   K 4.1      CO2 24   BUN 15   CREATININE 0.6   CALCIUM 8.3*   MG 1.8     CBC:   Recent Labs   Lab 03/06/20  0350 03/07/20  0541   WBC 21.65* 14.81*   HGB 10.7* 8.6*   HCT 31.6* 26.3*    289     CMP:   Recent Labs   Lab 03/06/20  0350 03/07/20  0541   * 132*   K 4.4 4.1    104   CO2 19* 24    98   BUN 13 15   CREATININE 0.5 0.6   CALCIUM 8.5* 8.3*   PROT 8.9* 7.9   ALBUMIN 2.6* 2.4*   BILITOT 3.5* 2.8*   ALKPHOS 188* 141*   AST 31 23   ALT 34 28   ANIONGAP 8 4*   EGFRNONAA >60.0 >60.0       Significant Imaging: I have reviewed all pertinent imaging results/findings within the past 24 hours.      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  Echo was reading as normal but as per cardiologist not able to see some area properly.      Plan   Continue IV antibiotics   she will need NAVA when she is more awake and alert . She is off precedix  Follow card and ID   PICC line placement   3/6: Pt is stable, will consent to LTAC therapy    3/7: continue IV antibiotics  LTAC placement if possible      TELLY (acute kidney injury)    resolved      Hepatitis C  History of hepatitis C  Right now bilirubin level is on the higher range along with slight elevation of hepatic panels    Plan    ultrasound of the abdomen with hepatic hemangioma, ultrasound follow-up in 6 months   AFP ordered      IVDU (intravenous drug user)  Patient uses heroin on daily basis  Watch closely/monitor for withdrawal symptoms  Methadone started          VTE Risk Mitigation (From admission, onward)         Ordered     enoxaparin injection 40 mg  Daily      02/29/20 1725     IP VTE HIGH RISK PATIENT  Once      02/29/20 1725                      Rafa JALLOH  MD Froylan  Department of Hospital Medicine   [facility]

## 2020-03-07 NOTE — PROGRESS NOTES
[facility] Medicine  Progress Note    Patient Name: Soraya Gee  MRN: [id number]  Patient Class: IP- Inpatient   Admission Date: 2/29/2020  Length of Stay: 6 days  Attending Physician: Og Thompson MD  Primary Care Provider: Jus Griggs Iii, MD        Subjective:     Principal Problem:Acute endocarditis        HPI:  34-year-old patient getting admitted with suspected sepsis from acute infective endocarditis  Patient is a IV drug abuse and uses heroin on a daily basis  For the past 2 days she has been having fever/myalgia/malaise and generalized weakness all over the body  Later she started having shortness of breath and patient did notice that her legs are getting more swollen  Because of the persistence of symptoms she came to the emergency room and got admitted  Patient initially thought she might have got cotton fever  Per patient if she will not take heroin every day she will develop severe withdrawal symptoms  No other issues.  She lives alone but has got a boyfriend who lives very nearby       Overview/Hospital Course:  Patient was admitted with most likely bacterial endocarditis  She has daily urine IV drug user.  Methadone started yesterday and increased the dose further  Still having persistent fevers, blood culture with persistent  MRSA  Seen and examined the patient she is awake alert oriented and able to answer all the questions, off Precedex  Patient is going to need NAVA    3/3: Ms Gee is sedated on precedex and methadone. Pt arouses but has limited response to questions but she did move all extremities,    3/4: Pt is awake and alert with c/o 8/10 flank pain. Pt has 4/10 pain with oxycontin for break thru pain. She has no neurologic complaints. I have discussed the case with Dr Rodriguez and appreciate her imput. Pt nurse and I discussed the need for rehab Re opiate dependency.    3/5: Pt states that the methadone really helps her pain and opiate cravings. She has taken  only 1 oxycodone today. Pt with seek treatment at a methadone clinic post discharge. Her side pain has improved from an 8/10 to < 4/10. Pt has improved enough to be sent to telemetry    3/6: Ms Elizondo has no complaints. Case discussed with Dr Rodriguez may go to an LTiiAC for further therapy. I will consult  for the above    Interval History: Pt would like to attend a methadone clinic. She will consent to LTAC therapy    Review of Systems   Constitutional: Negative.  Negative for chills, fatigue and fever.   HENT: Negative.    Eyes: Negative.    Respiratory: Negative.    Cardiovascular: Positive for chest pain.   Gastrointestinal: Negative.    Genitourinary: Negative.    Musculoskeletal: Negative.    Neurological: Negative.    Psychiatric/Behavioral: Negative.      Objective:     Vital Signs (Most Recent):  Temp: 98.7 °F (37.1 °C) (03/06/20 1649)  Pulse: 104 (03/06/20 1649)  Resp: (!) 23 (03/06/20 1649)  BP: 105/68 (03/06/20 1649)  SpO2: 97 % (03/06/20 1649) Vital Signs (24h Range):  Temp:  [98.2 °F (36.8 °C)-99.8 °F (37.7 °C)] 98.7 °F (37.1 °C)  Pulse:  [] 104  Resp:  [18-92] 23  SpO2:  [90 %-98 %] 97 %  BP: (105-137)/(57-89) 105/68     Weight: 80.7 kg (177 lb 14.6 oz)  Body mass index is 27.05 kg/m².    Intake/Output Summary (Last 24 hours) at 3/6/2020 2059  Last data filed at 3/6/2020 1501  Gross per 24 hour   Intake 1350 ml   Output 450 ml   Net 900 ml      Physical Exam   HENT:   Head: Normocephalic and atraumatic.   Eyes: Pupils are equal, round, and reactive to light. Conjunctivae and EOM are normal.   Neck: Normal range of motion. Neck supple.   Cardiovascular: Normal rate and regular rhythm. Exam reveals gallop.   Murmur heard.  Pulmonary/Chest: Effort normal and breath sounds normal.   Abdominal: Soft. Bowel sounds are normal.   Musculoskeletal: Normal range of motion.   Neurological: She is alert.   Skin: No rash noted. No erythema.   Psychiatric: She has a normal mood and affect.        Significant Labs:   Blood Culture:   Recent Labs   Lab 03/05/20  0317 03/06/20  0350   LABBLOO No Growth to date  No Growth to date No Growth to date     BMP:   Recent Labs   Lab 03/06/20  0350      *   K 4.4      CO2 19*   BUN 13   CREATININE 0.5   CALCIUM 8.5*   MG 1.7     CBC:   Recent Labs   Lab 03/06/20  0350   WBC 21.65*   HGB 10.7*   HCT 31.6*        CMP:   Recent Labs   Lab 03/05/20  0318 03/06/20  0350   * 131*   K 4.3 4.4    104   CO2 22* 19*   GLU 98 102   BUN 12 13   CREATININE 0.5 0.5   CALCIUM 8.1* 8.5*   PROT 7.1 8.9*   ALBUMIN 2.1* 2.6*   BILITOT 3.1* 3.5*   ALKPHOS 165* 188*   AST 36 31   ALT 33 34   ANIONGAP 6* 8   EGFRNONAA >60.0 >60.0     POCT Glucose: No results for input(s): POCTGLUCOSE in the last 48 hours.    Significant Imaging: I have reviewed all pertinent imaging results/findings within the past 24 hours.      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  Echo was reading as normal but as per cardiologist not able to see some area properly.      Plan   Continue IV antibiotics   she will need NAVA when she is more awake and alert . She is off precedix  Follow card and ID   PICC line placement   3/6: Pt is stable, will consent to LTAC therapy      TELLY (acute kidney injury)    resolved      Hepatitis C  History of hepatitis C  Right now bilirubin level is on the higher range along with slight elevation of hepatic panels    Plan    ultrasound of the abdomen with hepatic hemangioma, ultrasound follow-up in 6 months   AFP ordered      IVDU (intravenous drug user)  Patient uses heroin on daily basis  Watch closely/monitor for withdrawal symptoms  Methadone started          VTE Risk Mitigation (From admission, onward)         Ordered     enoxaparin injection 40 mg  Daily      02/29/20 1725     IP VTE HIGH RISK PATIENT  Once       02/29/20 1725                      Rafa Galeano MD  Department of Hospital Medicine   [facility]

## 2020-03-07 NOTE — PLAN OF CARE
Problem: Malnutrition  Goal: Improved Nutritional Intake  Outcome: Ongoing, Progressing  Intervention: Promote and Optimize Oral Intake  Flowsheets (Taken 3/7/2020 3576)  Oral Nutrition Promotion: calorie dense liquids provided   Continue current diet and supplements as tolerated, encourage intake.  Recommend initiation of 100 mg/day Thiamine, 1 mg/day Folic acid, and daily MVI due to Hx of IVDU.   Recommend that a medical diagnosis of malnutrition be added to patient's problem list if physician agrees with dietitian's Nutrition Focused Physical Exam (NFPE) findings that support medical diagnosis.

## 2020-03-07 NOTE — PROGRESS NOTES
[facility]  Adult Nutrition   Progress Note (Follow-Up)    SUMMARY     Recommendations/Interventions:  1. Continue current diet and supplements as tolerated, encourage intake.  2. Recommend initiation of 100 mg/day Thiamine, 1 mg/day Folic acid, and daily MVI due to Hx of IVDU.   3. Recommend that a medical diagnosis of malnutrition be added to patient's problem list if physician agrees with dietitian's Nutrition Focused Physical Exam (NFPE) findings that support medical diagnosis.   ? Moderate malnutrition in the context of social or environmental circumstances related to inadequate protein-energy intake during IV drug use as evidence by patient stating she has a decreased appetite during drug use, skips meals and sometimes goes 24 hours without eating, weight loss of 6.8 kg in 7 months (not severe, but may be masked by edema and bed weight is not most accurate measure), mild and trace edema noted, NFPE findings of mild fat depletion of orbitals and thoracic and lumbar region; and mild muscle depletion of  temples, clavicle region, scapular region and interosseous muscle.   Goals:  1. Patient to continue to increase intake and meet at least 75% of estimated needs via PO intake of meals and supplements  2. 100 mg/day Thiamine, 1 mg/day Folic acid, and daily MVI to be added.  3. Malnutrition diagnosis added to patients problem list if MD agrees with RD findings.    Dietitian Rounds Brief:  · Seen 2' f/u. Patients appetite and intake improving. Ate 50% of breakfast and was drinking Ensure. Patient prefers oral supplement over ice-notified kitchen to provide a cup of ice with each meal tray. No issues with N/V/D. Patient stated it takes her some time to gain her appetite back during withdrawal. Patient agreed to LTAC placement. Will continue to monitor intake, labs, and plan of care.    Malnutrition Assessment  Malnutrition Type: social/environmental circumstances  Reason for Assessment  Reason For  "Assessment: RD follow-up  Diagnosis: (endocarditis)  Relevant Medical History: acute endocarditis, IVDU, hepatitis C, TELLY  Interdisciplinary Rounds: did not attend  Nutrition Discharge Planning: Regular Diet (3 meals per day), ensure enlive as needed, abstinence from drug use.    Nutrition Risk Screen  Nutrition Risk Screen: no indicators present  [REMOVED]      Wound 20 0400 medial Buttocks-Wound Image: Images linked  MST Score: 0  Have you recently lost weight without trying?: No  Weight loss score: 0  Have you been eating poorly because of a decreased appetite?: No  Appetite score: 0     Nutrition/Diet History  Patient Reported Diet/Restrictions/Preferences: general  Typical Food/Fluid Intake: fair   Spiritual, Cultural Beliefs, Jehovah's witness Practices, Values that Affect Care: no  Food Allergies: NKFA  Factors Affecting Nutritional Intake: None identified at this time    Anthropometrics  Temp: 99.3 °F (37.4 °C)  Height Method: Stated  Height: 5' 8" (172.7 cm)  Height (inches): 68 in  Weight Method: Bed Scale  Weight: 80.7 kg (177 lb 14.6 oz)  Weight (lb): 177.91 lb  Ideal Body Weight (IBW), Female: 140 lb  % Ideal Body Weight, Female (lb): 133.07 %  BMI (Calculated): 27.1  BMI Grade: 25 - 29.9 - overweight  Weight Loss: unintentional  Usual Body Weight (UBW), k.4 kg(UBW = about 190 lbs per patient )  % Usual Body Weight: 93.6  % Weight Change From Usual Weight: -6.6 %     Weight History:  Wt Readings from Last 10 Encounters:   20 80.7 kg (177 lb 14.6 oz)   19 87.5 kg (193 lb)     Lab/Procedures/Meds: Pertinent Labs Reviewed  Clinical Chemistry:  Recent Labs   Lab 20  1515 20  1851 20  0541   *  132*  --  132*   K 2.5*  2.5*  --  4.1   CL 91*  91*  --  104   CO2 26  26  --  24   GLU 88  88  --  98   BUN 52*  52*  --  15   CREATININE 1.8*  1.8*  --  0.6   CALCIUM 8.5*  8.5*  --  8.3*   PROT 6.9  6.9  --  7.9   ALBUMIN 2.9*  2.9*  --  2.4*   BILITOT 6.2*  6.2*  " --  2.8*   ALKPHOS 164*  164*  --  141*   AST 72*  72*  --  23   ALT 65*  65*  --  28   ANIONGAP 15  15  --  4*   ESTGFRAFRICA 42.0*  42.0*  --  >60.0   EGFRNONAA 36.5*  36.5*  --  >60.0   MG 1.7  --  1.8   PHOS 3.6  --   --    AMYLASE  --  25  --    LIPASE  --  29  --     < > = values in this interval not displayed.     CBC:   Recent Labs   Lab 03/07/20  0541   WBC 14.81*   RBC 2.78*   HGB 8.6*   HCT 26.3*      MCV 95   MCH 30.9   MCHC 32.7     Cardiac Profile:  Recent Labs   Lab 02/29/20  1515 02/29/20  1851   BNP 87  --    CPK 19*  --    CPKMB 1.5  --    TROPONINI 0.069* 0.156*     Inflammatory Labs:  Recent Labs   Lab 02/29/20  1515 03/02/20  0442 03/05/20  0318   CRP 24.33* 14.94* 10.18*     Medications: Pertinent Medications reviewed  Scheduled Meds:   ceftaroline (TEFLARO) IVPB  600 mg Intravenous Q8H    DAPTOmycin (CUBICIN)  IV  8 mg/kg Intravenous Q24H    enoxaparin  40 mg Subcutaneous Daily    methadone  30 mg Oral Daily     Continuous Infusions:  PRN Meds:.acetaminophen, calcium chloride IVPB, calcium chloride IVPB, calcium chloride IVPB, ibuprofen, lorazepam, magnesium oxide, magnesium sulfate IVPB, magnesium sulfate IVPB, magnesium sulfate IVPB, magnesium sulfate IVPB, ondansetron, oxyCODONE, potassium chloride in water, potassium chloride in water, potassium chloride in water, potassium chloride in water, potassium chloride, potassium chloride, potassium chloride, potassium chloride, DIPH,PERTUS (ADACEL),TETANUS PF VAC (ADULT)    Estimated/Assessed Needs    Weight Used For Calorie Calculations: 80.7 kg (177 lb 14.6 oz)  Energy Calorie Requirements (kcal): 4778-8397 kcals/day (25-30 kcals/kg)  Energy Need Method: Kcal/kg  Protein Requirements:  g/day (1.2-1.5 g/kg)  Weight Used For Protein Calculations: 80.7 kg (177 lb 14.6 oz)     Estimated Fluid Requirement Method: RDA Method    Nutrition Prescription Ordered    Current Diet Order: Regular Diet   Oral Nutrition Supplement:  Ensure Enlive TID     Evaluation of Received Nutrient/Fluid Intake    Energy Calories Required: not meeting needs  Protein Required: not meeting needs  Fluid Required: meeting needs  Tolerance: tolerating  % Intake of Estimated Energy Needs: 50 - 75 %  % Meal Intake: 50 - 75 %    Intake/Output Summary (Last 24 hours) at 3/7/2020 0944  Last data filed at 3/7/2020 0800  Gross per 24 hour   Intake 1600 ml   Output 450 ml   Net 1150 ml      Nutrition Risk    Level of Risk/Frequency of Follow-up: high   Monitor and Evaluation    Food and Nutrient Intake: food and beverage intake, energy intake  Food and Nutrient Adminstration: diet order  Physical Activity and Function: nutrition-related ADLs and IADLs, factors affecting access to physical activity  Anthropometric Measurements: weight, weight change, body mass index  Biochemical Data, Medical Tests and Procedures: electrolyte and renal panel, gastrointestinal profile, glucose/endocrine profile, inflammatory profile, lipid profile  Nutrition-Focused Physical Findings: overall appearance, extremities, muscles and bones, head and eyes, skin     Nutrition Follow-Up    RD Follow-up?: Yes  Vandana Toro RD 03/07/2020 9:48 AM

## 2020-03-07 NOTE — PROGRESS NOTES
"Progress Note  Infectious Disease    Reason for Consult:  Probable endocarditis    02/29/2020.  Consult note. Soraya Gee is a  appearing 34 y.o. female with past medical history of hepatitis-C, IV drug use, in present for 3 years, released in March 2019, that is when she resumed IV drugs.  She comes in complaining of shaking chills, fever, myalgia, malaise, generalized weakness, 3 days duration after injecting some "bad drugs".  Initially she thought she had cotton fever but the symptoms persisted so she came to ER.  Patient also complains of shortness of breath, she was tachycardic and tachypneic on admission.  She has noticed swelling throughout.  Her albumin is low  Patient is a IV drug abuse and uses heroin twice a day, for a total daily dose of 1 g.  No sick contacts.  She is admitted in ICU for closer observation to make sure she does not withdrawal.  Patient is tachycardic, tachypneic, hurting all over, her history is limited by although this    03/01/2020.  Withdrawal symptoms are improved with Precedex drip; although she still feels like she is breathing fast and she is hurting all over.  She spiked fever of 101.3.  Blood cultures came positive for GPC.  No urinary complaints, no new skin or joint complaint, no cough or phlegm.  03/02/2020.  She spiked another fever of 102.3.  All cultures are turning up to be MRSA  CRP improved from 20/5 down to 13.  WBC is still elevated.  She gets confused on and off but then she interacts appropriately.    3/3:  Chart reviewed and discussed with Dr. Mccormack.  Persistently positive blood cultures with MRSA, vancomycin EDDIE 1.  Persistently hectic fever and septic appearance, improve hyperbilirubinemia, right upper quadrant tenderness, generalized aches and pains, sedated a bit on Precedex and methadone, neurologically intact.  3/4: still febrile through late last night. 3/2 cultures negative, but 3/3 cultures have GPC. CT chest/abd/pelvis reviewed and septic pulmonary " "emboli are present, but no spleen, muscle or paraspinous lesions noted.  She is currently up in the chair, off Precedex.  We discussed her diagnosis and she is willing to receive the treatment.  Does not have much appetite but she will drink the in [place].  She still hurts everywhere and does have some pleurisy.  3/5: temperature improving. Blood cultures from yesterday are negative so far. She looks much improved, clearer mentally, still very uncomfortable, somewhat anxious. Not remembering information given yesterday. Expresses desire to "get off " the drugs. Still has no appetite. Drinking a little ensure. No diarrhea. Still splinting from pleurisy. Discussed with her about the NAVA. Length of therapy.   3/6: afebrile. Blood cultures neg x 2 days. Very anxious/hyperventilating, about NAVA. D/w anesthesia. Reassured her. No nausea, vomiting. Ok for picc line.  3/7: afebrile. VSS. 1 blood culture from yesterday drawn peripherally "left wrist" has GPC. I cannot confirm that it wasn't drawn from midline but site is different. NAVA demonstrated TV vegetation with flail leaflet and TR. No change in vision, no new MSK pain, or skin lesion. Still has some pleural discomfort associated with septic emboli.     Antibiotics (From admission, onward)    Start     Stop Route Frequency Ordered    03/03/20 1000  DAPTOmycin (CUBICIN) 645 mg in sodium chloride 0.9% IVPB      -- IV Every 24 hours (non-standard times) 03/03/20 0847    03/03/20 1000  ceftaroline (TEFLARO) 600 mg in dextrose 5 % 50 mL IVPB (ready to mix system)      -- IV Every 8 hours (non-standard times) 03/03/20 0847        Antifungals (From admission, onward)    None        Antivirals (From admission, onward)    None            EXAM & DIAGNOSTICS REVIEWED:   Vitals:     Temp:  [98.7 °F (37.1 °C)-99.9 °F (37.7 °C)]   Temp: 99.3 °F (37.4 °C) (03/07/20 0701)  Pulse: 97 (03/07/20 0701)  Resp: 20 (03/07/20 0701)  BP: 126/76 (03/07/20 0701)  SpO2: 99 % (03/07/20 " 0701)    Intake/Output Summary (Last 24 hours) at 3/7/2020 1145  Last data filed at 3/7/2020 0800  Gross per 24 hour   Intake 700 ml   Output --   Net 700 ml     Vitals:    03/07/20 0701   BP: 126/76   Pulse: 97   Resp: 20   Temp: 99.3 °F (37.4 °C)         General:  Alert cooperative, tachypneic,very anxious about NAVA  Eyes:  Less icteric, PERRL, EOMI, no scleral conjunctival lesions  ENT:  No ulcers, exudates, thrush, nares patent, edentulous/dentures.     Neck:  Supple,   Lungs: Tachypneic, clearer, but  Splinting less  Heart:  Regular, tachycardic common soft 1/6 systolic murmur  Abd:  Soft, ND, normal BS, less tender.  :  Vasquez out  Musc:  No focal joint swelling, synovitis, myositis, moves all extremities  Skin:  Marks from prior IV use.  Palms and soles are erythematous, without septic emboli, no subungual petechia   Wound:   Neuro:  speech fluent, face symmetric, moves all extremities, no focal weakness.   Psych:  Alert, very anxious,  cooperative   Lymphatic:     No cervical, supraclavicular, axillary, or inguinal nodes  Extrem:  No palpable edema but hands and feet are less edematous  No  , phlebitis, cellulitis, warm and well perfused  VAD:   midline left arm 3/5   Isolation:  Contact    Lines/Tubes/Drains:  Peripheral IV    General Labs reviewed:  Recent Labs   Lab 03/04/20  0358 03/06/20  0350 03/07/20  0541   WBC 16.94* 21.65* 14.81*   HGB 9.3* 10.7* 8.6*   HCT 28.2* 31.6* 26.3*   * 288 289       Recent Labs   Lab 03/05/20  0318 03/06/20  0350 03/07/20  0541   * 131* 132*   K 4.3 4.4 4.1    104 104   CO2 22* 19* 24   BUN 12 13 15   CREATININE 0.5 0.5 0.6   CALCIUM 8.1* 8.5* 8.3*   PROT 7.1 8.9* 7.9   BILITOT 3.1* 3.5* 2.8*   ALKPHOS 165* 188* 141*   ALT 33 34 28   AST 36 31 23       Recent Labs   Lab 02/29/20  1515 03/02/20  0442 03/05/20  0318   CRP 24.33* 14.94* 10.18*             Micro:  Microbiology Results (last 7 days)     Procedure Component Value Units Date/Time    Blood  culture [[id number]] Collected:  03/06/20 0350    Order Status:  Completed Specimen:  Blood Updated:  03/07/20 0736     Blood Culture, Routine Gram stain aer bottle: Gram positive cocci      Results called to and read back by:Darius Olvera RN-[address] [place] [address];        03/07/2020  05:25 CJD    Blood culture [[id number]] Collected:  03/07/20 0656    Order Status:  Sent Specimen:  Blood Updated:  03/07/20 0703    Blood culture [[id number]] Collected:  03/04/20 0358    Order Status:  Completed Specimen:  Blood Updated:  03/07/20 0632     Blood Culture, Routine No Growth to date      No Growth to date      No Growth to date      No Growth to date    Blood culture [[id number]] Collected:  03/05/20 0317    Order Status:  Completed Specimen:  Blood Updated:  03/07/20 0632     Blood Culture, Routine No Growth to date      No Growth to date      No Growth to date    Blood culture [[id number]]  (Abnormal)  (Susceptibility) Collected:  03/02/20 0441    Order Status:  Completed Specimen:  Blood from Antecubital, Right Updated:  03/06/20 0744     Blood Culture, Routine Gram stain aer bottle: Gram positive cocci in clusters resembling Staph      Positive results previously called on [id number]      METHICILLIN RESISTANT STAPHYLOCOCCUS AUREUS  Results called to and read back by: Dahlia Briscoe RN MICU  03/05/2020  14:01 by DRP      Blood culture [[id number]]  (Abnormal) Collected:  03/03/20 0502    Order Status:  Completed Specimen:  Blood Updated:  03/04/20 1119     Blood Culture, Routine Gram stain aer bottle: Gram positive cocci       Results called to and read back by: Israel Ledesma RN MICU.      METHICILLIN RESISTANT STAPHYLOCOCCUS AUREUS  For susceptibility see order #[id number]      Blood culture [[id number]]  (Abnormal) Collected:  02/29/20 1605    Order Status:  Completed Specimen:  Blood from Peripheral, Upper Arm, Right Updated:  03/03/20 0617     Blood Culture, Routine Gram stain aer bottle: Gram positive cocci      Gram stain porfirio bottle:  Gram positive cocci      Positive results previously called      METHICILLIN RESISTANT STAPHYLOCOCCUS AUREUS  For susceptibility see order #[id number]      Blood culture x two cultures. Draw prior to antibiotics. [[id number]]  (Abnormal) Collected:  02/29/20 1517    Order Status:  Completed Specimen:  Blood from Peripheral, Forearm, Right Updated:  03/03/20 0616     Blood Culture, Routine Gram stain aer bottle: Gram positive cocci      Gram stain porfirio bottle: Gram positive cocci      Results called to and read back by:KAELA Guidry;  03/01/2020        02:44 CJD      METHICILLIN RESISTANT STAPHYLOCOCCUS AUREUS  For susceptibility see order #[id number]      Narrative:       Aerobic and anaerobic    Blood culture [[id number]]  (Abnormal) Collected:  03/01/20 0521    Order Status:  Completed Specimen:  Blood Updated:  03/03/20 0616     Blood Culture, Routine Gram stain aer bottle: Gram positive cocci      Results called to and read back by:KAELA Rios;        03/02/2020  02:01 CJD      METHICILLIN RESISTANT STAPHYLOCOCCUS AUREUS  For susceptibility see order #[id number]      Blood culture x two cultures. Draw prior to antibiotics. [[id number]]  (Abnormal)  (Susceptibility) Collected:  02/29/20 1542    Order Status:  Completed Specimen:  Blood from Peripheral, Forearm, Right Updated:  03/03/20 0614     Blood Culture, Routine Gram stain aer bottle: Gram positive cocci      Gram stain porfirio bottle: Gram positive cocci      Results called to and read back by:KAELA Guidry;  03/01/2020        02:45 CJD      METHICILLIN RESISTANT STAPHYLOCOCCUS AUREUS  Results called to and read back by:Javi Gordon RN  03/02/2020  11:05   JBM  JBM      Narrative:       Aerobic and anaerobic    Urine culture [[id number]] Collected:  02/29/20 1522    Order Status:  Completed Specimen:  Urine Updated:  03/02/20 0652     Urine Culture, Routine No growth    Narrative:       Preferred Collection Type->Urine, Clean  Catch  Specimen Source->Urine    Blood culture [[id number]]     Order Status:  Canceled Specimen:  Blood         Imaging Reviewed:  Chest x-rays    CXR Mild central hilar bronchovascular crowding, possibly secondary to low lung volumes and atelectasis or very mild edema, or atypical infection/bronchitis as above    US1.  Sludge seen in the gallbladder, without findings to specifically suggest acute cholecystitis (noting borderline gallbladder wall thickening).  2.  Rounded echogenic focus in the right lobe of the liver, with imaging features of an hepatic hemangioma, consider ultrasound follow-up in 6 months to document stability.  3.  Numerous small periportal lymph nodes, likely reactive/inflammatory in nature.  Consider correlation with liver function tests and hepatitis serologies.    CT chest/abd/pelvis 3/3  Bilateral diffuse nodular predominantly pleural based opacities without cavitation.  Findings are most consistent with septic emboli.  Multifocal pneumonia could not be excluded.  There is bibasilar airspace disease which may represent atelectasis or infiltrates with small bilateral pleural effusions  Mild hepatosplenomegaly.  The hyperechoic mass in the right lobe of the liver is not seen on CT and most likely represents hemangioma  No acute abnormality within the abdomen or pelvis    Cardiology:    Transthoracic echo 3/1  Left Ventricle Normal ejection fraction at 60%. Normal left ventricular cavity size. Normal wall thickness observed. Normal left ventricular diastolic function. Normal left atrial pressure. No wall motion abnormalities.   Right Ventricle Normal cavity size, wall thickness and systolic function. Wall motion normal.   Left Atrium The left atrium is normal.   Right Atrium There is normal right atrial size.   Aortic Valve The aortic valve appears structurally normal. There is no stenosis. No regurgitation. There is normal leaflet mobility.The LVOT diameter is 2.02 cm.   Mitral Valve The  mitral valve appears structurally normal. There is normal leaflet mobility. No regurgitation.   Tricuspid Valve The tricuspid valve appears structurally normal. No stenosis. Trace regurgitation. There is normal leaflet mobility. The estimated PA systolic pressure is 26 mmHg.   Pulmonic Valve The pulmonic valve was not well visualized. No stenosis. No regurgitation.   IVC/SVC Normal central venous pressure (3 mm Hg).   Ascending Aorta The aortic root and ascending aorta are normal in size.   Pericardium No pericardial effusion.     NAVA 3/6:  1. Aortic valve is structurally normal with good leaflet excursion. No significant regurgitation.   2. Mitral valve is structurally normal with good leaflet excursion. Trace regurgitation.   3. Tricuspid valve: There appears to be a small vegetation (0.9 X 0.4 cm) attached to the base of the septal leaflet. There also appears to be prolapse and flail of the leaflets causing severe eccentric tricuspid regurgitation.   4. Pulmonary valve is structurally normal with good leaflet excursion. No significant regurgitation.  5. Overall LVEF and RVEF appears normal.   6. No obvious shunt across the interatrial septum by color flow Doppler and agitated saline.     IMPRESSION & PLAN     Persistent MRSA bacteremia(2/29, 3/1, 3/2 3/3 ) 3/4 neg so far.  Presumptive endocarditis in a patient with history of IV drug use. Septic pulmonary emboli    Elevated markers of inflammation including procal, CRP, ESR. improving  TELLY.  Improved.   Epigastric right upper quadrant discomfort and generalized abdominal discomfort.  Jaundice Transaminitis, heavy opiate use, H/o Hep C, untreated, ammonia normal:  Bilirubin improved  IVDU. heroin  Anemia   Protein malnutrition.  Hypoalbuminemia =  2.6  anxiety  This patient is high risk for life-threatening deterioration and death secondary to above comorbidities and need for IV treatment.      Recommendations:  Continue daptomycin 8 mg per kg plus ceftaroline  (shown to clear bacteremia more quickly)  If  Yesterday's blood culture is not Staph aureus, will revert back to vancomycin   ok to present to LT[facility]. [facility]    Prognosis is improved

## 2020-03-08 LAB
ALBUMIN SERPL BCP-MCNC: 2.7 G/DL (ref 3.5–5.2)
ALP SERPL-CCNC: 134 U/L (ref 55–135)
ALT SERPL W/O P-5'-P-CCNC: 28 U/L (ref 10–44)
ANION GAP SERPL CALC-SCNC: 9 MMOL/L (ref 8–16)
AST SERPL-CCNC: 23 U/L (ref 10–40)
BASOPHILS # BLD AUTO: 0.07 K/UL (ref 0–0.2)
BASOPHILS NFR BLD: 0.5 % (ref 0–1.9)
BILIRUB SERPL-MCNC: 2.3 MG/DL (ref 0.1–1)
BUN SERPL-MCNC: 11 MG/DL (ref 6–20)
CALCIUM SERPL-MCNC: 8.8 MG/DL (ref 8.7–10.5)
CHLORIDE SERPL-SCNC: 101 MMOL/L (ref 95–110)
CO2 SERPL-SCNC: 24 MMOL/L (ref 23–29)
CREAT SERPL-MCNC: 0.5 MG/DL (ref 0.5–1.4)
DIFFERENTIAL METHOD: ABNORMAL
EOSINOPHIL # BLD AUTO: 0.3 K/UL (ref 0–0.5)
EOSINOPHIL NFR BLD: 1.7 % (ref 0–8)
ERYTHROCYTE [DISTWIDTH] IN BLOOD BY AUTOMATED COUNT: 14.4 % (ref 11.5–14.5)
EST. GFR  (AFRICAN AMERICAN): >60 ML/MIN/1.73 M^2
EST. GFR  (NON AFRICAN AMERICAN): >60 ML/MIN/1.73 M^2
GLUCOSE SERPL-MCNC: 97 MG/DL (ref 70–110)
HCT VFR BLD AUTO: 26.8 % (ref 37–48.5)
HGB BLD-MCNC: 8.7 G/DL (ref 12–16)
IMM GRANULOCYTES # BLD AUTO: 0.42 K/UL (ref 0–0.04)
IMM GRANULOCYTES NFR BLD AUTO: 2.8 % (ref 0–0.5)
LYMPHOCYTES # BLD AUTO: 2.5 K/UL (ref 1–4.8)
LYMPHOCYTES NFR BLD: 16.4 % (ref 18–48)
MAGNESIUM SERPL-MCNC: 1.7 MG/DL (ref 1.6–2.6)
MCH RBC QN AUTO: 31.2 PG (ref 27–31)
MCHC RBC AUTO-ENTMCNC: 32.5 G/DL (ref 32–36)
MCV RBC AUTO: 96 FL (ref 82–98)
MONOCYTES # BLD AUTO: 1.1 K/UL (ref 0.3–1)
MONOCYTES NFR BLD: 7.5 % (ref 4–15)
NEUTROPHILS # BLD AUTO: 10.7 K/UL (ref 1.8–7.7)
NEUTROPHILS NFR BLD: 71.1 % (ref 38–73)
NRBC BLD-RTO: 0 /100 WBC
PLATELET # BLD AUTO: 369 K/UL (ref 150–350)
PMV BLD AUTO: 10.2 FL (ref 9.2–12.9)
POTASSIUM SERPL-SCNC: 4 MMOL/L (ref 3.5–5.1)
PROT SERPL-MCNC: 8 G/DL (ref 6–8.4)
RBC # BLD AUTO: 2.79 M/UL (ref 4–5.4)
SODIUM SERPL-SCNC: 134 MMOL/L (ref 136–145)
WBC # BLD AUTO: 15.09 K/UL (ref 3.9–12.7)

## 2020-03-08 PROCEDURE — 12000002 HC ACUTE/MED SURGE SEMI-PRIVATE ROOM

## 2020-03-08 PROCEDURE — 85025 COMPLETE CBC W/AUTO DIFF WBC: CPT

## 2020-03-08 PROCEDURE — 63600175 PHARM REV CODE 636 W HCPCS: Performed by: INTERNAL MEDICINE

## 2020-03-08 PROCEDURE — 25000003 PHARM REV CODE 250: Performed by: INTERNAL MEDICINE

## 2020-03-08 PROCEDURE — 63600175 PHARM REV CODE 636 W HCPCS: Mod: JG | Performed by: INTERNAL MEDICINE

## 2020-03-08 PROCEDURE — 80053 COMPREHEN METABOLIC PANEL: CPT

## 2020-03-08 PROCEDURE — 83735 ASSAY OF MAGNESIUM: CPT

## 2020-03-08 RX ADMIN — CEFTAROLINE FOSAMIL 600 MG: 600 POWDER, FOR SOLUTION INTRAVENOUS at 04:03

## 2020-03-08 RX ADMIN — CEFTAROLINE FOSAMIL 600 MG: 600 POWDER, FOR SOLUTION INTRAVENOUS at 08:03

## 2020-03-08 RX ADMIN — CEFTAROLINE FOSAMIL 600 MG: 600 POWDER, FOR SOLUTION INTRAVENOUS at 01:03

## 2020-03-08 RX ADMIN — DAPTOMYCIN 645 MG: 500 INJECTION, POWDER, LYOPHILIZED, FOR SOLUTION INTRAVENOUS at 12:03

## 2020-03-08 RX ADMIN — ENOXAPARIN SODIUM 40 MG: 100 INJECTION SUBCUTANEOUS at 05:03

## 2020-03-08 RX ADMIN — METHADONE HYDROCHLORIDE 30 MG: 10 TABLET ORAL at 08:03

## 2020-03-08 NOTE — PROGRESS NOTES
Critical access hospital Medicine  Progress Note    Patient Name: Soraya Gee  MRN: [id number]  Patient Class: IP- Inpatient   Admission Date: 2/29/2020  Length of Stay: 8 days  Attending Physician: Og Thompson MD  Primary Care Provider: Jus Griggs Iii, MD        Subjective:     Principal Problem:Acute endocarditis        HPI:  34-year-old patient getting admitted with suspected sepsis from acute infective endocarditis  Patient is a IV drug abuse and uses heroin on a daily basis  For the past 2 days she has been having fever/myalgia/malaise and generalized weakness all over the body  Later she started having shortness of breath and patient did notice that her legs are getting more swollen  Because of the persistence of symptoms she came to the emergency room and got admitted  Patient initially thought she might have got cotton fever  Per patient if she will not take heroin every day she will develop severe withdrawal symptoms  No other issues.  She lives alone but has got a boyfriend who lives very nearby       Overview/Hospital Course:  Patient was admitted with most likely bacterial endocarditis  She has daily urine IV drug user.  Methadone started yesterday and increased the dose further  Still having persistent fevers, blood culture with persistent  MRSA  Seen and examined the patient she is awake alert oriented and able to answer all the questions, off Precedex  Patient is going to need NAVA    3/3: Ms Gee is sedated on precedex and methadone. Pt arouses but has limited response to questions but she did move all extremities,    3/4: Pt is awake and alert with c/o 8/10 flank pain. Pt has 4/10 pain with oxycontin for break thru pain. She has no neurologic complaints. I have discussed the case with Dr Rodriguez and appreciate her imput. Pt nurse and I discussed the need for rehab Re opiate dependency.    3/5: Pt states that the methadone really helps her pain and opiate cravings. She has taken  only 1 oxycodone today. Pt with seek treatment at a methadone clinic post discharge. Her side pain has improved from an 8/10 to < 4/10. Pt has improved enough to be sent to telemetry    3/6: Ms Elizondo has no complaints. Case discussed with Dr Rodriguez may go to an LTiiAC for further therapy. I will consult  for the above    3/7: I am feeling better, my side pain is low( 4/10 at worst) . Pt agrees to LTAC placement    3/8: The patient is achy but comfortable with pain < 4/10    Interval History: Pt is awaiting LTAC placement.    Review of Systems   Constitutional: Positive for fatigue.   HENT: Negative.    Respiratory: Negative for shortness of breath.    Cardiovascular: Positive for chest pain.   Gastrointestinal: Negative.    Genitourinary: Negative.    Musculoskeletal: Positive for arthralgias and myalgias.   Neurological: Negative.    Psychiatric/Behavioral: Negative.      Objective:     Vital Signs (Most Recent):  Temp: 98.3 °F (36.8 °C) (03/08/20 1100)  Pulse: (!) 112 (03/08/20 1100)  Resp: 17 (03/08/20 1100)  BP: 132/83 (03/08/20 1100)  SpO2: 98 % (03/08/20 1100) Vital Signs (24h Range):  Temp:  [97.9 °F (36.6 °C)-98.7 °F (37.1 °C)] 98.3 °F (36.8 °C)  Pulse:  [] 112  Resp:  [17-20] 17  SpO2:  [95 %-98 %] 98 %  BP: (119-142)/(76-89) 132/83     Weight: 80.7 kg (177 lb 14.6 oz)  Body mass index is 27.05 kg/m².    Intake/Output Summary (Last 24 hours) at 3/8/2020 1515  Last data filed at 3/8/2020 1000  Gross per 24 hour   Intake 460 ml   Output --   Net 460 ml      Physical Exam   Constitutional: She is oriented to person, place, and time. No distress.   HENT:   Head: Normocephalic and atraumatic.   Eyes: Pupils are equal, round, and reactive to light. Conjunctivae and EOM are normal.   Neck: Normal range of motion. Neck supple.   Cardiovascular: Normal rate and regular rhythm.   Murmur heard.  Pulmonary/Chest: Effort normal and breath sounds normal.   Abdominal: Soft. Bowel sounds are normal.    Musculoskeletal: Normal range of motion.   Neurological: She is alert and oriented to person, place, and time.   Skin: She is not diaphoretic.   Psychiatric: She has a normal mood and affect.       Significant Labs:   Blood Culture:   Recent Labs   Lab 03/07/20  0656   LABBLOO No Growth to date  No Growth to date     BMP:   Recent Labs   Lab 03/08/20  0730   GLU 97   *   K 4.0      CO2 24   BUN 11   CREATININE 0.5   CALCIUM 8.8   MG 1.7     CBC:   Recent Labs   Lab 03/07/20  0541 03/08/20  0730   WBC 14.81* 15.09*   HGB 8.6* 8.7*   HCT 26.3* 26.8*    369*     CMP:   Recent Labs   Lab 03/07/20  0541 03/08/20  0730   * 134*   K 4.1 4.0    101   CO2 24 24   GLU 98 97   BUN 15 11   CREATININE 0.6 0.5   CALCIUM 8.3* 8.8   PROT 7.9 8.0   ALBUMIN 2.4* 2.7*   BILITOT 2.8* 2.3*   ALKPHOS 141* 134   AST 23 23   ALT 28 28   ANIONGAP 4* 9   EGFRNONAA >60.0 >60.0     Magnesium:   Recent Labs   Lab 03/07/20  0541 03/08/20  0730   MG 1.8 1.7     Urine Culture: No results for input(s): LABURIN in the last 48 hours.    Significant Imaging: I have reviewed all pertinent imaging results/findings within the past 24 hours.      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  Echo was reading as normal but as per cardiologist not able to see some area properly.      Plan   Continue IV antibiotics   she will need NAVA when she is more awake and alert . She is off precedix  Follow card and ID   PICC line placement   3/6: Pt is stable, will consent to LTAC therapy    3/7: continue IV antibiotics  LTAC placement if possible      TELLY (acute kidney injury)    resolved      Hepatitis C  History of hepatitis C  Right now bilirubin level is on the higher range along with slight elevation of hepatic panels    Plan    ultrasound of the abdomen with hepatic hemangioma, ultrasound follow-up  in 6 months   AFP ordered      IVDU (intravenous drug user)  Patient uses heroin on daily basis  Watch closely/monitor for withdrawal symptoms  Methadone started          VTE Risk Mitigation (From admission, onward)         Ordered     enoxaparin injection 40 mg  Daily      02/29/20 1725     IP VTE HIGH RISK PATIENT  Once      02/29/20 1725                      Rafa Galeano MD  Department of Hospital Medicine   [facility]

## 2020-03-08 NOTE — PROGRESS NOTES
"Progress Note  Infectious Disease    Reason for Consult:  Probable endocarditis    02/29/2020.  Consult note. Soraya Gee is a  appearing 34 y.o. female with past medical history of hepatitis-C, IV drug use, in present for 3 years, released in March 2019, that is when she resumed IV drugs.  She comes in complaining of shaking chills, fever, myalgia, malaise, generalized weakness, 3 days duration after injecting some "bad drugs".  Initially she thought she had cotton fever but the symptoms persisted so she came to ER.  Patient also complains of shortness of breath, she was tachycardic and tachypneic on admission.  She has noticed swelling throughout.  Her albumin is low  Patient is a IV drug abuse and uses heroin twice a day, for a total daily dose of 1 g.  No sick contacts.  She is admitted in ICU for closer observation to make sure she does not withdrawal.  Patient is tachycardic, tachypneic, hurting all over, her history is limited by although this    03/01/2020.  Withdrawal symptoms are improved with Precedex drip; although she still feels like she is breathing fast and she is hurting all over.  She spiked fever of 101.3.  Blood cultures came positive for GPC.  No urinary complaints, no new skin or joint complaint, no cough or phlegm.  03/02/2020.  She spiked another fever of 102.3.  All cultures are turning up to be MRSA  CRP improved from 20/5 down to 13.  WBC is still elevated.  She gets confused on and off but then she interacts appropriately.    3/3:  Chart reviewed and discussed with Dr. Mccormack.  Persistently positive blood cultures with MRSA, vancomycin EDDIE 1.  Persistently hectic fever and septic appearance, improve hyperbilirubinemia, right upper quadrant tenderness, generalized aches and pains, sedated a bit on Precedex and methadone, neurologically intact.  3/4: still febrile through late last night. 3/2 cultures negative, but 3/3 cultures have GPC. CT chest/abd/pelvis reviewed and septic pulmonary " "emboli are present, but no spleen, muscle or paraspinous lesions noted.  She is currently up in the chair, off Precedex.  We discussed her diagnosis and she is willing to receive the treatment.  Does not have much appetite but she will drink the in [place].  She still hurts everywhere and does have some pleurisy.  3/5: temperature improving. Blood cultures from yesterday are negative so far. She looks much improved, clearer mentally, still very uncomfortable, somewhat anxious. Not remembering information given yesterday. Expresses desire to "get off " the drugs. Still has no appetite. Drinking a little ensure. No diarrhea. Still splinting from pleurisy. Discussed with her about the NAVA. Length of therapy.   3/6: afebrile. Blood cultures neg x 2 days. Very anxious/hyperventilating, about NAVA. D/w anesthesia. Reassured her. No nausea, vomiting. Ok for picc line.  3/7: afebrile. VSS. 1 blood culture from yesterday drawn peripherally "left wrist" has GPC. I cannot confirm that it wasn't drawn from midline but site is different. NAVA demonstrated TV vegetation with flail leaflet and TR. No change in vision, no new MSK pain, or skin lesion. Still has some pleural discomfort associated with septic emboli.   3/8: 3/6 blood culture identified as Staph aureus. She went outside but now knows she is restricted. She has showered and put on make up. She is eating well. No diarrhea. Mild LE edema which she states is not new. No antibiotic intolerance. No change in vision, new MSK pain, new skin lesion    Antibiotics (From admission, onward)    Start     Stop Route Frequency Ordered    03/08/20 1200  DAPTOmycin (CUBICIN) 645 mg in sodium chloride 0.9% IVPB      -- IV Every 24 hours (non-standard times) 03/07/20 1251    03/03/20 1000  ceftaroline (TEFLARO) 600 mg in dextrose 5 % 50 mL IVPB (ready to mix system)      -- IV Every 8 hours (non-standard times) 03/03/20 0847        Antifungals (From admission, onward)    None    "     Antivirals (From admission, onward)    None            EXAM & DIAGNOSTICS REVIEWED:   Vitals:     Temp:  [97.9 °F (36.6 °C)-98.7 °F (37.1 °C)]   Temp: 98.3 °F (36.8 °C) (03/08/20 1100)  Pulse: (!) 112 (03/08/20 1100)  Resp: 17 (03/08/20 1100)  BP: 132/83 (03/08/20 1100)  SpO2: 98 % (03/08/20 1100)    Intake/Output Summary (Last 24 hours) at 3/8/2020 1517  Last data filed at 3/8/2020 1000  Gross per 24 hour   Intake 460 ml   Output --   Net 460 ml     Vitals:    03/08/20 1100   BP: 132/83   Pulse: (!) 112   Resp: 17   Temp: 98.3 °F (36.8 °C)         General:  Alert cooperative, tachypneic,very anxious about NAVA  Eyes:  Less icteric, PERRL, EOMI, no scleral conjunctival lesions  ENT:  No ulcers, exudates, thrush, nares patent, edentulous/dentures.     Neck:  Supple,   Lungs: Tachypneic, clearer, but  Splinting less  Heart:  Regular, tachycardic common soft 1/6 systolic murmur  Abd:  Soft, ND, normal BS, less tender.  :  Vasquez out  Musc:  No focal joint swelling, synovitis, myositis, moves all extremities  Skin:  Marks from prior IV use.  Palms and soles are erythematous, without septic emboli, no subungual petechia   Wound:   Neuro:  speech fluent, face symmetric, moves all extremities, no focal weakness.   Psych:  Alert, very anxious,  cooperative   Lymphatic:     No cervical, supraclavicular, axillary, or inguinal nodes  Extrem:  No palpable edema but hands and feet are less edematous  No  , phlebitis, cellulitis, warm and well perfused  VAD:   midline left arm 3/5   Isolation:  Contact    Lines/Tubes/Drains:  Peripheral IV    General Labs reviewed:  Recent Labs   Lab 03/06/20  0350 03/07/20  0541 03/08/20  0730   WBC 21.65* 14.81* 15.09*   HGB 10.7* 8.6* 8.7*   HCT 31.6* 26.3* 26.8*    289 369*       Recent Labs   Lab 03/06/20  0350 03/07/20  0541 03/08/20  0730   * 132* 134*   K 4.4 4.1 4.0    104 101   CO2 19* 24 24   BUN 13 15 11   CREATININE 0.5 0.6 0.5   CALCIUM 8.5* 8.3* 8.8   PROT  8.9* 7.9 8.0   BILITOT 3.5* 2.8* 2.3*   ALKPHOS 188* 141* 134   ALT 34 28 28   AST 31 23 23       Recent Labs   Lab 03/02/20  0442 03/05/20  0318   CRP 14.94* 10.18*             Micro:  Microbiology Results (last 7 days)     Procedure Component Value Units Date/Time    Blood culture [[id number]]     Order Status:  No result Specimen:  Blood     Blood culture [[id number]]  (Abnormal) Collected:  03/06/20 0350    Order Status:  Completed Specimen:  Blood Updated:  03/08/20 1125     Blood Culture, Routine Gram stain aer bottle: Gram positive cocci      Results called to and read back by:Darius Olvera RN-[address] [place] [address];        03/07/2020  05:25 CJD      STAPHYLOCOCCUS AUREUS  Susceptibility pending      Blood culture [[id number]] Collected:  03/07/20 0656    Order Status:  Completed Specimen:  Blood Updated:  03/08/20 1032     Blood Culture, Routine No Growth to date      No Growth to date    Blood culture [[id number]] Collected:  03/04/20 0358    Order Status:  Completed Specimen:  Blood Updated:  03/08/20 0632     Blood Culture, Routine No Growth to date      No Growth to date      No Growth to date      No Growth to date      No Growth to date    Blood culture [[id number]] Collected:  03/05/20 0317    Order Status:  Completed Specimen:  Blood Updated:  03/08/20 0632     Blood Culture, Routine No Growth to date      No Growth to date      No Growth to date      No Growth to date    Blood culture [[id number]]  (Abnormal)  (Susceptibility) Collected:  03/02/20 0441    Order Status:  Completed Specimen:  Blood from Antecubital, Right Updated:  03/06/20 0744     Blood Culture, Routine Gram stain aer bottle: Gram positive cocci in clusters resembling Staph      Positive results previously called on [id number]      METHICILLIN RESISTANT STAPHYLOCOCCUS AUREUS  Results called to and read back by: Dahlia Briscoe RN MICU  03/05/2020  14:01 by DRP      Blood culture [[id number]]  (Abnormal) Collected:  03/03/20 0502    Order Status:   Completed Specimen:  Blood Updated:  03/04/20 1119     Blood Culture, Routine Gram stain aer bottle: Gram positive cocci       Results called to and read back by: Israel Ledesma RN MICU.      METHICILLIN RESISTANT STAPHYLOCOCCUS AUREUS  For susceptibility see order #[id number]      Blood culture [[id number]]  (Abnormal) Collected:  02/29/20 1605    Order Status:  Completed Specimen:  Blood from Peripheral, Upper Arm, Right Updated:  03/03/20 0617     Blood Culture, Routine Gram stain aer bottle: Gram positive cocci      Gram stain porfirio bottle: Gram positive cocci      Positive results previously called      METHICILLIN RESISTANT STAPHYLOCOCCUS AUREUS  For susceptibility see order #[id number]      Blood culture x two cultures. Draw prior to antibiotics. [[id number]]  (Abnormal) Collected:  02/29/20 1517    Order Status:  Completed Specimen:  Blood from Peripheral, Forearm, Right Updated:  03/03/20 0616     Blood Culture, Routine Gram stain aer bottle: Gram positive cocci      Gram stain porfirio bottle: Gram positive cocci      Results called to and read back by:Xenia Sargent RN-[facility];  03/01/2020        02:44 CJD      METHICILLIN RESISTANT STAPHYLOCOCCUS AUREUS  For susceptibility see order #[id number]      Narrative:       Aerobic and anaerobic    Blood culture [[id number]]  (Abnormal) Collected:  03/01/20 0521    Order Status:  Completed Specimen:  Blood Updated:  03/03/20 0616     Blood Culture, Routine Gram stain aer bottle: Gram positive cocci      Results called to and read back by:Tami Posadas RN-[facility];        03/02/2020  02:01 CJD      METHICILLIN RESISTANT STAPHYLOCOCCUS AUREUS  For susceptibility see order #[id number]      Blood culture x two cultures. Draw prior to antibiotics. [[id number]]  (Abnormal)  (Susceptibility) Collected:  02/29/20 1542    Order Status:  Completed Specimen:  Blood from Peripheral, Forearm, Right Updated:  03/03/20 0614     Blood Culture, Routine Gram stain aer bottle: Gram positive cocci       Gram stain porfirio bottle: Gram positive cocci      Results called to and read back by:Xenia Sargent, RN-[facility];  03/01/2020        02:45 CJD      METHICILLIN RESISTANT STAPHYLOCOCCUS AUREUS  Results called to and read back by:Javi Gordon RN  03/02/2020  11:05   JBM  JBM      Narrative:       Aerobic and anaerobic    Urine culture [[id number]] Collected:  02/29/20 1522    Order Status:  Completed Specimen:  Urine Updated:  03/02/20 0652     Urine Culture, Routine No growth    Narrative:       Preferred Collection Type->Urine, Clean Catch  Specimen Source->Urine        Imaging Reviewed:  Chest x-rays    CXR Mild central hilar bronchovascular crowding, possibly secondary to low lung volumes and atelectasis or very mild edema, or atypical infection/bronchitis as above    US1.  Sludge seen in the gallbladder, without findings to specifically suggest acute cholecystitis (noting borderline gallbladder wall thickening).  2.  Rounded echogenic focus in the right lobe of the liver, with imaging features of an hepatic hemangioma, consider ultrasound follow-up in 6 months to document stability.  3.  Numerous small periportal lymph nodes, likely reactive/inflammatory in nature.  Consider correlation with liver function tests and hepatitis serologies.    CT chest/abd/pelvis 3/3  Bilateral diffuse nodular predominantly pleural based opacities without cavitation.  Findings are most consistent with septic emboli.  Multifocal pneumonia could not be excluded.  There is bibasilar airspace disease which may represent atelectasis or infiltrates with small bilateral pleural effusions  Mild hepatosplenomegaly.  The hyperechoic mass in the right lobe of the liver is not seen on CT and most likely represents hemangioma  No acute abnormality within the abdomen or pelvis    Cardiology:    Transthoracic echo 3/1  Left Ventricle Normal ejection fraction at 60%. Normal left ventricular cavity size. Normal wall thickness observed. Normal left  ventricular diastolic function. Normal left atrial pressure. No wall motion abnormalities.   Right Ventricle Normal cavity size, wall thickness and systolic function. Wall motion normal.   Left Atrium The left atrium is normal.   Right Atrium There is normal right atrial size.   Aortic Valve The aortic valve appears structurally normal. There is no stenosis. No regurgitation. There is normal leaflet mobility.The LVOT diameter is 2.02 cm.   Mitral Valve The mitral valve appears structurally normal. There is normal leaflet mobility. No regurgitation.   Tricuspid Valve The tricuspid valve appears structurally normal. No stenosis. Trace regurgitation. There is normal leaflet mobility. The estimated PA systolic pressure is 26 mmHg.   Pulmonic Valve The pulmonic valve was not well visualized. No stenosis. No regurgitation.   IVC/SVC Normal central venous pressure (3 mm Hg).   Ascending Aorta The aortic root and ascending aorta are normal in size.   Pericardium No pericardial effusion.     NAVA 3/6:  1. Aortic valve is structurally normal with good leaflet excursion. No significant regurgitation.   2. Mitral valve is structurally normal with good leaflet excursion. Trace regurgitation.   3. Tricuspid valve: There appears to be a small vegetation (0.9 X 0.4 cm) attached to the base of the septal leaflet. There also appears to be prolapse and flail of the leaflets causing severe eccentric tricuspid regurgitation.   4. Pulmonary valve is structurally normal with good leaflet excursion. No significant regurgitation.  5. Overall LVEF and RVEF appears normal.   6. No obvious shunt across the interatrial septum by color flow Doppler and agitated saline.     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 Septic pulmonary emboli   Flail valve leaflet with tricuspid regurg  Elevated markers of  inflammation including procal, CRP, ESR. improving  TELLY.  Improved.   Epigastric right upper quadrant discomfort and generalized abdominal discomfort.  Jaundice Transaminitis, heavy opiate use, H/o Hep C, untreated, ammonia normal:  Bilirubin improved  IVDU. heroin  Anemia   Protein malnutrition.  Hypoalbuminemia =  2.6  anxiety  This patient is high risk for life-threatening deterioration and death secondary to above comorbidities and need for IV treatment.      Recommendations:  Continue daptomycin 8 mg per kg plus ceftaroline (shown to clear bacteremia more quickly)  When blood cultures are clear for 2-3 days, will revert  Back to vancomycin   ok to present to LTAC. She would prefer [facility]    Prognosis is improved

## 2020-03-08 NOTE — SUBJECTIVE & OBJECTIVE
Interval History: Pt is awaiting LTAC placement.    Review of Systems   Constitutional: Positive for fatigue.   HENT: Negative.    Respiratory: Negative for shortness of breath.    Cardiovascular: Positive for chest pain.   Gastrointestinal: Negative.    Genitourinary: Negative.    Musculoskeletal: Positive for arthralgias and myalgias.   Neurological: Negative.    Psychiatric/Behavioral: Negative.      Objective:     Vital Signs (Most Recent):  Temp: 98.3 °F (36.8 °C) (03/08/20 1100)  Pulse: (!) 112 (03/08/20 1100)  Resp: 17 (03/08/20 1100)  BP: 132/83 (03/08/20 1100)  SpO2: 98 % (03/08/20 1100) Vital Signs (24h Range):  Temp:  [97.9 °F (36.6 °C)-98.7 °F (37.1 °C)] 98.3 °F (36.8 °C)  Pulse:  [] 112  Resp:  [17-20] 17  SpO2:  [95 %-98 %] 98 %  BP: (119-142)/(76-89) 132/83     Weight: 80.7 kg (177 lb 14.6 oz)  Body mass index is 27.05 kg/m².    Intake/Output Summary (Last 24 hours) at 3/8/2020 1515  Last data filed at 3/8/2020 1000  Gross per 24 hour   Intake 460 ml   Output --   Net 460 ml      Physical Exam   Constitutional: She is oriented to person, place, and time. No distress.   HENT:   Head: Normocephalic and atraumatic.   Eyes: Pupils are equal, round, and reactive to light. Conjunctivae and EOM are normal.   Neck: Normal range of motion. Neck supple.   Cardiovascular: Normal rate and regular rhythm.   Murmur heard.  Pulmonary/Chest: Effort normal and breath sounds normal.   Abdominal: Soft. Bowel sounds are normal.   Musculoskeletal: Normal range of motion.   Neurological: She is alert and oriented to person, place, and time.   Skin: She is not diaphoretic.   Psychiatric: She has a normal mood and affect.       Significant Labs:   Blood Culture:   Recent Labs   Lab 03/07/20  0656   LABBLOO No Growth to date  No Growth to date     BMP:   Recent Labs   Lab 03/08/20  0730   GLU 97   *   K 4.0      CO2 24   BUN 11   CREATININE 0.5   CALCIUM 8.8   MG 1.7     CBC:   Recent Labs   Lab  03/07/20  0541 03/08/20  0730   WBC 14.81* 15.09*   HGB 8.6* 8.7*   HCT 26.3* 26.8*    369*     CMP:   Recent Labs   Lab 03/07/20  0541 03/08/20  0730   * 134*   K 4.1 4.0    101   CO2 24 24   GLU 98 97   BUN 15 11   CREATININE 0.6 0.5   CALCIUM 8.3* 8.8   PROT 7.9 8.0   ALBUMIN 2.4* 2.7*   BILITOT 2.8* 2.3*   ALKPHOS 141* 134   AST 23 23   ALT 28 28   ANIONGAP 4* 9   EGFRNONAA >60.0 >60.0     Magnesium:   Recent Labs   Lab 03/07/20  0541 03/08/20  0730   MG 1.8 1.7     Urine Culture: No results for input(s): LABURIN in the last 48 hours.    Significant Imaging: I have reviewed all pertinent imaging results/findings within the past 24 hours.

## 2020-03-09 LAB
ALBUMIN SERPL BCP-MCNC: 2.7 G/DL (ref 3.5–5.2)
ALP SERPL-CCNC: 122 U/L (ref 55–135)
ALT SERPL W/O P-5'-P-CCNC: 29 U/L (ref 10–44)
ANION GAP SERPL CALC-SCNC: 6 MMOL/L (ref 8–16)
AST SERPL-CCNC: 24 U/L (ref 10–40)
BACTERIA BLD CULT: ABNORMAL
BACTERIA BLD CULT: NORMAL
BASOPHILS # BLD AUTO: 0.08 K/UL (ref 0–0.2)
BASOPHILS NFR BLD: 0.5 % (ref 0–1.9)
BILIRUB SERPL-MCNC: 2 MG/DL (ref 0.1–1)
BUN SERPL-MCNC: 13 MG/DL (ref 6–20)
CALCIUM SERPL-MCNC: 8.9 MG/DL (ref 8.7–10.5)
CHLORIDE SERPL-SCNC: 103 MMOL/L (ref 95–110)
CO2 SERPL-SCNC: 27 MMOL/L (ref 23–29)
CREAT SERPL-MCNC: 0.5 MG/DL (ref 0.5–1.4)
DIFFERENTIAL METHOD: ABNORMAL
EOSINOPHIL # BLD AUTO: 0.2 K/UL (ref 0–0.5)
EOSINOPHIL NFR BLD: 1.6 % (ref 0–8)
ERYTHROCYTE [DISTWIDTH] IN BLOOD BY AUTOMATED COUNT: 14.4 % (ref 11.5–14.5)
EST. GFR  (AFRICAN AMERICAN): >60 ML/MIN/1.73 M^2
EST. GFR  (NON AFRICAN AMERICAN): >60 ML/MIN/1.73 M^2
GLUCOSE SERPL-MCNC: 96 MG/DL (ref 70–110)
HCT VFR BLD AUTO: 26.7 % (ref 37–48.5)
HGB BLD-MCNC: 8.5 G/DL (ref 12–16)
IMM GRANULOCYTES # BLD AUTO: 0.29 K/UL (ref 0–0.04)
IMM GRANULOCYTES NFR BLD AUTO: 2 % (ref 0–0.5)
LYMPHOCYTES # BLD AUTO: 2.6 K/UL (ref 1–4.8)
LYMPHOCYTES NFR BLD: 17.8 % (ref 18–48)
MAGNESIUM SERPL-MCNC: 1.8 MG/DL (ref 1.6–2.6)
MCH RBC QN AUTO: 30.5 PG (ref 27–31)
MCHC RBC AUTO-ENTMCNC: 31.8 G/DL (ref 32–36)
MCV RBC AUTO: 96 FL (ref 82–98)
MONOCYTES # BLD AUTO: 1.2 K/UL (ref 0.3–1)
MONOCYTES NFR BLD: 8.1 % (ref 4–15)
NEUTROPHILS # BLD AUTO: 10.2 K/UL (ref 1.8–7.7)
NEUTROPHILS NFR BLD: 70 % (ref 38–73)
NRBC BLD-RTO: 0 /100 WBC
PLATELET # BLD AUTO: 437 K/UL (ref 150–350)
PMV BLD AUTO: 10 FL (ref 9.2–12.9)
POTASSIUM SERPL-SCNC: 4.2 MMOL/L (ref 3.5–5.1)
PROT SERPL-MCNC: 8.2 G/DL (ref 6–8.4)
RBC # BLD AUTO: 2.79 M/UL (ref 4–5.4)
SODIUM SERPL-SCNC: 136 MMOL/L (ref 136–145)
WBC # BLD AUTO: 14.65 K/UL (ref 3.9–12.7)

## 2020-03-09 PROCEDURE — 12000002 HC ACUTE/MED SURGE SEMI-PRIVATE ROOM

## 2020-03-09 PROCEDURE — 87040 BLOOD CULTURE FOR BACTERIA: CPT

## 2020-03-09 PROCEDURE — 94761 N-INVAS EAR/PLS OXIMETRY MLT: CPT

## 2020-03-09 PROCEDURE — 80053 COMPREHEN METABOLIC PANEL: CPT

## 2020-03-09 PROCEDURE — 85025 COMPLETE CBC W/AUTO DIFF WBC: CPT

## 2020-03-09 PROCEDURE — 99900035 HC TECH TIME PER 15 MIN (STAT)

## 2020-03-09 PROCEDURE — 63600175 PHARM REV CODE 636 W HCPCS: Performed by: INTERNAL MEDICINE

## 2020-03-09 PROCEDURE — 63600175 PHARM REV CODE 636 W HCPCS: Mod: JG | Performed by: INTERNAL MEDICINE

## 2020-03-09 PROCEDURE — 27000221 HC OXYGEN, UP TO 24 HOURS

## 2020-03-09 PROCEDURE — 83735 ASSAY OF MAGNESIUM: CPT

## 2020-03-09 PROCEDURE — 25000003 PHARM REV CODE 250: Performed by: INTERNAL MEDICINE

## 2020-03-09 RX ADMIN — METHADONE HYDROCHLORIDE 30 MG: 10 TABLET ORAL at 09:03

## 2020-03-09 RX ADMIN — DAPTOMYCIN 645 MG: 500 INJECTION, POWDER, LYOPHILIZED, FOR SOLUTION INTRAVENOUS at 11:03

## 2020-03-09 RX ADMIN — ENOXAPARIN SODIUM 40 MG: 100 INJECTION SUBCUTANEOUS at 05:03

## 2020-03-09 RX ADMIN — CEFTAROLINE FOSAMIL 600 MG: 600 POWDER, FOR SOLUTION INTRAVENOUS at 04:03

## 2020-03-09 RX ADMIN — CEFTAROLINE FOSAMIL 600 MG: 600 POWDER, FOR SOLUTION INTRAVENOUS at 01:03

## 2020-03-09 RX ADMIN — CEFTAROLINE FOSAMIL 600 MG: 600 POWDER, FOR SOLUTION INTRAVENOUS at 08:03

## 2020-03-09 NOTE — PROGRESS NOTES
"Progress Note  Infectious Disease    Reason for Consult:  Probable endocarditis    02/29/2020.  Consult note. Soraya Gee is a  appearing 34 y.o. female with past medical history of hepatitis-C, IV drug use, in present for 3 years, released in March 2019, that is when she resumed IV drugs.  She comes in complaining of shaking chills, fever, myalgia, malaise, generalized weakness, 3 days duration after injecting some "bad drugs".  Initially she thought she had cotton fever but the symptoms persisted so she came to ER.  Patient also complains of shortness of breath, she was tachycardic and tachypneic on admission.  She has noticed swelling throughout.  Her albumin is low  Patient is a IV drug abuse and uses heroin twice a day, for a total daily dose of 1 g.  No sick contacts.  She is admitted in ICU for closer observation to make sure she does not withdrawal.  Patient is tachycardic, tachypneic, hurting all over, her history is limited by although this    03/01/2020.  Withdrawal symptoms are improved with Precedex drip; although she still feels like she is breathing fast and she is hurting all over.  She spiked fever of 101.3.  Blood cultures came positive for GPC.  No urinary complaints, no new skin or joint complaint, no cough or phlegm.  03/02/2020.  She spiked another fever of 102.3.  All cultures are turning up to be MRSA  CRP improved from 20/5 down to 13.  WBC is still elevated.  She gets confused on and off but then she interacts appropriately.    3/3:  Chart reviewed and discussed with Dr. Mccormack.  Persistently positive blood cultures with MRSA, vancomycin EDDIE 1.  Persistently hectic fever and septic appearance, improve hyperbilirubinemia, right upper quadrant tenderness, generalized aches and pains, sedated a bit on Precedex and methadone, neurologically intact.  3/4: still febrile through late last night. 3/2 cultures negative, but 3/3 cultures have GPC. CT chest/abd/pelvis reviewed and septic pulmonary " "emboli are present, but no spleen, muscle or paraspinous lesions noted.  She is currently up in the chair, off Precedex.  We discussed her diagnosis and she is willing to receive the treatment.  Does not have much appetite but she will drink the in [place].  She still hurts everywhere and does have some pleurisy.  3/5: temperature improving. Blood cultures from yesterday are negative so far. She looks much improved, clearer mentally, still very uncomfortable, somewhat anxious. Not remembering information given yesterday. Expresses desire to "get off " the drugs. Still has no appetite. Drinking a little ensure. No diarrhea. Still splinting from pleurisy. Discussed with her about the NAVA. Length of therapy.   3/6: afebrile. Blood cultures neg x 2 days. Very anxious/hyperventilating, about NAVA. D/w anesthesia. Reassured her. No nausea, vomiting. Ok for picc line.  3/7: afebrile. VSS. 1 blood culture from yesterday drawn peripherally "left wrist" has GPC. I cannot confirm that it wasn't drawn from midline but site is different. NAVA demonstrated TV vegetation with flail leaflet and TR. No change in vision, no new MSK pain, or skin lesion. Still has some pleural discomfort associated with septic emboli.   3/8: 3/6 blood culture identified as Staph aureus. She went outside but now knows she is restricted. She has showered and put on make up. She is eating well. No diarrhea. Mild LE edema which she states is not new. No antibiotic intolerance. No change in vision, new MSK pain, new skin lesion  3/9: blood cultures not drawn until this afternoon. She feels well. Eating well. Would like to walk more. LTAC eval in progress.     Antibiotics (From admission, onward)    Start     Stop Route Frequency Ordered    03/08/20 1200  DAPTOmycin (CUBICIN) 645 mg in sodium chloride 0.9% IVPB      -- IV Every 24 hours (non-standard times) 03/07/20 1251    03/03/20 1000  ceftaroline (TEFLARO) 600 mg in dextrose 5 % 50 mL IVPB (ready to mix " system)      -- IV Every 8 hours (non-standard times) 03/03/20 0847        Antifungals (From admission, onward)    None        Antivirals (From admission, onward)    None            EXAM & DIAGNOSTICS REVIEWED:   Vitals:     Temp:  [98.2 °F (36.8 °C)-99.2 °F (37.3 °C)]   Temp: 98.4 °F (36.9 °C) (03/09/20 1713)  Pulse: 99 (03/09/20 1713)  Resp: 20 (03/09/20 1713)  BP: 119/79 (03/09/20 1713)  SpO2: 97 % (03/09/20 1713)  No intake or output data in the 24 hours ending 03/09/20 1741  Vitals:    03/09/20 1713   BP: 119/79   Pulse: 99   Resp: 20   Temp: 98.4 °F (36.9 °C)         General:  Alert cooperative, tachypneic,   Eyes:  anicteric, PERRL, EOMI, no scleral conjunctival lesions  ENT:  No ulcers, exudates, thrush, nares patent, edentulous/dentures.     Neck:  Supple,   Lungs: clear  Heart:  Regular,  soft 1/6 systolic murmur  Abd:  Soft, ND, normal BS, less tender.  :  Voiding without difficulty  Musc:  No focal joint swelling, synovitis, myositis, moves all extremities  Skin:  Marks from prior IV use.   , without septic emboli, no subungual petechia   Wound:   Neuro:  speech fluent, face symmetric, moves all extremities, no focal weakness.   Psych:  Alert, v   cooperative   Lymphatic:        Extrem:  No palpable edema  , phlebitis, cellulitis, warm and well perfused  VAD:   midline left arm 3/5 picc  Isolation:  Contact    Lines/Tubes/Drains:  Peripheral IV    General Labs reviewed:  Recent Labs   Lab 03/07/20  0541 03/08/20  0730 03/09/20  0359   WBC 14.81* 15.09* 14.65*   HGB 8.6* 8.7* 8.5*   HCT 26.3* 26.8* 26.7*    369* 437*       Recent Labs   Lab 03/07/20  0541 03/08/20  0730 03/09/20  0359   * 134* 136   K 4.1 4.0 4.2    101 103   CO2 24 24 27   BUN 15 11 13   CREATININE 0.6 0.5 0.5   CALCIUM 8.3* 8.8 8.9   PROT 7.9 8.0 8.2   BILITOT 2.8* 2.3* 2.0*   ALKPHOS 141* 134 122   ALT 28 28 29   AST 23 23 24       Recent Labs   Lab 03/05/20  0318   CRP 10.18*             Micro:  Microbiology  Results (last 7 days)     Procedure Component Value Units Date/Time    Blood culture [[id number]] Collected:  03/09/20 1622    Order Status:  Sent Specimen:  Blood from Line, PICC Right Basilic Updated:  03/09/20 1658    Blood culture [[id number]] Collected:  03/07/20 0656    Order Status:  Completed Specimen:  Blood Updated:  03/09/20 1032     Blood Culture, Routine No Growth to date      No Growth to date      No Growth to date    Blood culture [[id number]]  (Abnormal)  (Susceptibility) Collected:  03/06/20 0350    Order Status:  Completed Specimen:  Blood Updated:  03/09/20 0644     Blood Culture, Routine Gram stain aer bottle: Gram positive cocci      Results called to and read back by:Darius Olvera RN-[address] [place] [address];        03/07/2020  05:25 CJD      METHICILLIN RESISTANT STAPHYLOCOCCUS AUREUS  Known MRSA patient      Blood culture [[id number]] Collected:  03/04/20 0358    Order Status:  Completed Specimen:  Blood Updated:  03/09/20 0632     Blood Culture, Routine No growth after 5 days.    Blood culture [[id number]] Collected:  03/05/20 0317    Order Status:  Completed Specimen:  Blood Updated:  03/09/20 0632     Blood Culture, Routine No Growth to date      No Growth to date      No Growth to date      No Growth to date      No Growth to date    Blood culture [[id number]]     Order Status:  No result Specimen:  Blood     Blood culture [[id number]]  (Abnormal)  (Susceptibility) Collected:  03/02/20 0441    Order Status:  Completed Specimen:  Blood from Antecubital, Right Updated:  03/06/20 0744     Blood Culture, Routine Gram stain aer bottle: Gram positive cocci in clusters resembling Staph      Positive results previously called on [id number]      METHICILLIN RESISTANT STAPHYLOCOCCUS AUREUS  Results called to and read back by: Dahlia Briscoe RN MICU  03/05/2020  14:01 by MERY      Blood culture [[id number]]  (Abnormal) Collected:  03/03/20 0502    Order Status:  Completed Specimen:  Blood Updated:  03/04/20 1119     Blood  Culture, Routine Gram stain aer bottle: Gram positive cocci       Results called to and read back by: Israel Ledesma RN MICU.      METHICILLIN RESISTANT STAPHYLOCOCCUS AUREUS  For susceptibility see order #[id number]      Blood culture [[id number]]  (Abnormal) Collected:  02/29/20 1605    Order Status:  Completed Specimen:  Blood from Peripheral, Upper Arm, Right Updated:  03/03/20 0617     Blood Culture, Routine Gram stain aer bottle: Gram positive cocci      Gram stain porfirio bottle: Gram positive cocci      Positive results previously called      METHICILLIN RESISTANT STAPHYLOCOCCUS AUREUS  For susceptibility see order #[id number]      Blood culture x two cultures. Draw prior to antibiotics. [[id number]]  (Abnormal) Collected:  02/29/20 1517    Order Status:  Completed Specimen:  Blood from Peripheral, Forearm, Right Updated:  03/03/20 0616     Blood Culture, Routine Gram stain aer bottle: Gram positive cocci      Gram stain porfirio bottle: Gram positive cocci      Results called to and read back by:Xenia Sargent RN-[facility];  03/01/2020        02:44 CJD      METHICILLIN RESISTANT STAPHYLOCOCCUS AUREUS  For susceptibility see order #[id number]      Narrative:       Aerobic and anaerobic    Blood culture [[id number]]  (Abnormal) Collected:  03/01/20 0521    Order Status:  Completed Specimen:  Blood Updated:  03/03/20 0616     Blood Culture, Routine Gram stain aer bottle: Gram positive cocci      Results called to and read back by:Tami Posadas RN-[facility];        03/02/2020  02:01 CJD      METHICILLIN RESISTANT STAPHYLOCOCCUS AUREUS  For susceptibility see order #[id number]      Blood culture x two cultures. Draw prior to antibiotics. [[id number]]  (Abnormal)  (Susceptibility) Collected:  02/29/20 1542    Order Status:  Completed Specimen:  Blood from Peripheral, Forearm, Right Updated:  03/03/20 0614     Blood Culture, Routine Gram stain aer bottle: Gram positive cocci      Gram stain porfirio bottle: Gram positive cocci      Results  called to and read back by:Xenia Sargent, ONEIDA-[facility];  03/01/2020        02:45 CJSALVADOR      METHICILLIN RESISTANT STAPHYLOCOCCUS AUREUS  Results called to and read back by:Javi Gordon RN  03/02/2020  11:05   JBM  JBM      Narrative:       Aerobic and anaerobic        Imaging Reviewed:  Chest x-rays    CXR Mild central hilar bronchovascular crowding, possibly secondary to low lung volumes and atelectasis or very mild edema, or atypical infection/bronchitis as above    US1.  Sludge seen in the gallbladder, without findings to specifically suggest acute cholecystitis (noting borderline gallbladder wall thickening).  2.  Rounded echogenic focus in the right lobe of the liver, with imaging features of an hepatic hemangioma, consider ultrasound follow-up in 6 months to document stability.  3.  Numerous small periportal lymph nodes, likely reactive/inflammatory in nature.  Consider correlation with liver function tests and hepatitis serologies.    CT chest/abd/pelvis 3/3  Bilateral diffuse nodular predominantly pleural based opacities without cavitation.  Findings are most consistent with septic emboli.  Multifocal pneumonia could not be excluded.  There is bibasilar airspace disease which may represent atelectasis or infiltrates with small bilateral pleural effusions  Mild hepatosplenomegaly.  The hyperechoic mass in the right lobe of the liver is not seen on CT and most likely represents hemangioma  No acute abnormality within the abdomen or pelvis    Cardiology:    Transthoracic echo 3/1  Left Ventricle Normal ejection fraction at 60%. Normal left ventricular cavity size. Normal wall thickness observed. Normal left ventricular diastolic function. Normal left atrial pressure. No wall motion abnormalities.   Right Ventricle Normal cavity size, wall thickness and systolic function. Wall motion normal.   Left Atrium The left atrium is normal.   Right Atrium There is normal right atrial size.   Aortic Valve The aortic valve  appears structurally normal. There is no stenosis. No regurgitation. There is normal leaflet mobility.The LVOT diameter is 2.02 cm.   Mitral Valve The mitral valve appears structurally normal. There is normal leaflet mobility. No regurgitation.   Tricuspid Valve The tricuspid valve appears structurally normal. No stenosis. Trace regurgitation. There is normal leaflet mobility. The estimated PA systolic pressure is 26 mmHg.   Pulmonic Valve The pulmonic valve was not well visualized. No stenosis. No regurgitation.   IVC/SVC Normal central venous pressure (3 mm Hg).   Ascending Aorta The aortic root and ascending aorta are normal in size.   Pericardium No pericardial effusion.     NAVA 3/6:  1. Aortic valve is structurally normal with good leaflet excursion. No significant regurgitation.   2. Mitral valve is structurally normal with good leaflet excursion. Trace regurgitation.   3. Tricuspid valve: There appears to be a small vegetation (0.9 X 0.4 cm) attached to the base of the septal leaflet. There also appears to be prolapse and flail of the leaflets causing severe eccentric tricuspid regurgitation.   4. Pulmonary valve is structurally normal with good leaflet excursion. No significant regurgitation.  5. Overall LVEF and RVEF appears normal.   6. No obvious shunt across the interatrial septum by color flow Doppler and agitated saline.     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 Septic pulmonary emboli   Flail valve leaflet with tricuspid regurg  Elevated markers of inflammation including procal, CRP, ESR. improving  TELLY. resolved   Epigastric right upper quadrant discomfort and generalized abdominal discomfort.-resolved  Jaundice Transaminitis, heavy opiate use, H/o Hep C, untreated, ammonia normal:  Bilirubin improved  IVDU. heroin  Anemia   Protein malnutrition.   Hypoalbuminemia =  2.6  anxiety  This patient is high risk for life-threatening deterioration and death secondary to above comorbidities and need for IV treatment.      Recommendations:  Continue daptomycin 8 mg per kg plus ceftaroline (shown to clear bacteremia more quickly)  When blood cultures are clear for 2-3 days, will revert  Back to vancomycin    present to LTAC. She would prefer [facility]    Prognosis is improved

## 2020-03-09 NOTE — SUBJECTIVE & OBJECTIVE
Interval History: Pt is concerned Re IV procedures.    Review of Systems   Constitutional: Positive for fatigue. Negative for chills and fever.   HENT: Negative.    Respiratory: Negative.    Cardiovascular: Negative.    Gastrointestinal: Negative.    Genitourinary: Negative.    Musculoskeletal: Positive for myalgias.   Neurological: Negative.    Psychiatric/Behavioral: Positive for dysphoric mood.     Objective:     Vital Signs (Most Recent):  Temp: 98.5 °F (36.9 °C) (03/09/20 1153)  Pulse: 77 (03/09/20 1153)  Resp: 19 (03/09/20 1153)  BP: (!) 142/89 (03/09/20 1153)  SpO2: 97 % (03/09/20 1153) Vital Signs (24h Range):  Temp:  [98.2 °F (36.8 °C)-99.2 °F (37.3 °C)] 98.5 °F (36.9 °C)  Pulse:  [] 77  Resp:  [14-19] 19  SpO2:  [95 %-100 %] 97 %  BP: (109-147)/() 142/89     Weight: 80.7 kg (177 lb 14.6 oz)  Body mass index is 27.05 kg/m².    Intake/Output Summary (Last 24 hours) at 3/9/2020 1305  Last data filed at 3/8/2020 1700  Gross per 24 hour   Intake 240 ml   Output --   Net 240 ml      Physical Exam    Significant Labs:   BMP:   Recent Labs   Lab 03/09/20 0359   GLU 96      K 4.2      CO2 27   BUN 13   CREATININE 0.5   CALCIUM 8.9   MG 1.8     CBC:   Recent Labs   Lab 03/08/20  0730 03/09/20 0359   WBC 15.09* 14.65*   HGB 8.7* 8.5*   HCT 26.8* 26.7*   * 437*     CMP:   Recent Labs   Lab 03/08/20  0730 03/09/20 0359   * 136   K 4.0 4.2    103   CO2 24 27   GLU 97 96   BUN 11 13   CREATININE 0.5 0.5   CALCIUM 8.8 8.9   PROT 8.0 8.2   ALBUMIN 2.7* 2.7*   BILITOT 2.3* 2.0*   ALKPHOS 134 122   AST 23 24   ALT 28 29   ANIONGAP 9 6*   EGFRNONAA >60.0 >60.0     Magnesium:   Recent Labs   Lab 03/08/20  0730 03/09/20  0359   MG 1.7 1.8       Significant Imaging: I have reviewed all pertinent imaging results/findings within the past 24 hours.

## 2020-03-09 NOTE — PLAN OF CARE
03/09/20 1537   Discharge Assessment   Assessment Type Discharge Planning Assessment   Confirmed/corrected address and phone number on facesheet? Yes   Assessment information obtained from? Patient   Communicated expected length of stay with patient/caregiver yes   Prior to hospitilization cognitive status: Alert/Oriented   Prior to hospitalization functional status: Independent   Current cognitive status: Alert/Oriented   Current Functional Status: Independent   Lives With alone   Able to Return to Prior Arrangements yes   Is patient able to care for self after discharge? Yes   Patient's perception of discharge disposition home or selfcare   Readmission Within the Last 30 Days no previous admission in last 30 days   Patient currently being followed by outpatient case management? No   Patient currently receives any other outside agency services? No   Equipment Currently Used at Home none   Do you have any problems affording any of your prescribed medications? No   Is the patient taking medications as prescribed? yes   Does the patient have transportation home? Yes   Transportation Anticipated family or friend will provide   Does the patient receive services at the [facility]? No   Discharge Plan A Long-term acute care facility (LTAC)   Discharge Plan B Home Health   DME Needed Upon Discharge  none   Patient/Family in Agreement with Plan yes

## 2020-03-09 NOTE — PLAN OF CARE
03/09/20 1540   Post-Acute Status   Post-Acute Authorization Placement   Post-Acute Placement Status Referrals Sent   Spoke with patient about Freedom of Choice Form, explained to patient and family that they have the right to choose any agency, and a list of agencies was provided to patient and family to review, they verbalized an understanding, pt signed form, and form scanned into CM notes. Sent referral to [facility]. Aaliyah from [facility] came to talk to patient and is submitting to the insurance today for authorization.

## 2020-03-09 NOTE — RESPIRATORY THERAPY
03/09/20 0001   Patient Assessment/Suction   Level of Consciousness (AVPU) alert   Respiratory Effort Normal;Unlabored   Expansion/Accessory Muscles/Retractions expansion symmetric;no retractions;no use of accessory muscles   All Lung Fields Breath Sounds clear   Rhythm/Pattern, Respiratory no shortness of breath reported;pattern regular;unlabored   PRE-TX-O2   O2 Device (Oxygen Therapy) nasal cannula   Flow (L/min) 0   SpO2 97 %   Pulse Oximetry Type Intermittent   Pulse 85   Resp 14   Respiratory Evaluation   $ Care Plan Tech Time 15 min

## 2020-03-10 LAB
ALBUMIN SERPL BCP-MCNC: 2.6 G/DL (ref 3.5–5.2)
ALP SERPL-CCNC: 118 U/L (ref 55–135)
ALT SERPL W/O P-5'-P-CCNC: 33 U/L (ref 10–44)
ANION GAP SERPL CALC-SCNC: 6 MMOL/L (ref 8–16)
AST SERPL-CCNC: 29 U/L (ref 10–40)
BACTERIA BLD CULT: NORMAL
BASOPHILS # BLD AUTO: 0.09 K/UL (ref 0–0.2)
BASOPHILS NFR BLD: 0.7 % (ref 0–1.9)
BILIRUB SERPL-MCNC: 1.8 MG/DL (ref 0.1–1)
BUN SERPL-MCNC: 15 MG/DL (ref 6–20)
CALCIUM SERPL-MCNC: 8.8 MG/DL (ref 8.7–10.5)
CHLORIDE SERPL-SCNC: 104 MMOL/L (ref 95–110)
CO2 SERPL-SCNC: 26 MMOL/L (ref 23–29)
CREAT SERPL-MCNC: 0.5 MG/DL (ref 0.5–1.4)
CRP SERPL-MCNC: 2.52 MG/DL (ref 0–0.75)
CRP SERPL-MCNC: 2.52 MG/DL (ref 0–0.75)
DIFFERENTIAL METHOD: ABNORMAL
EOSINOPHIL # BLD AUTO: 0.3 K/UL (ref 0–0.5)
EOSINOPHIL NFR BLD: 2.2 % (ref 0–8)
ERYTHROCYTE [DISTWIDTH] IN BLOOD BY AUTOMATED COUNT: 14.9 % (ref 11.5–14.5)
EST. GFR  (AFRICAN AMERICAN): >60 ML/MIN/1.73 M^2
EST. GFR  (NON AFRICAN AMERICAN): >60 ML/MIN/1.73 M^2
GLUCOSE SERPL-MCNC: 94 MG/DL (ref 70–110)
HCT VFR BLD AUTO: 24.9 % (ref 37–48.5)
HGB BLD-MCNC: 8 G/DL (ref 12–16)
IMM GRANULOCYTES # BLD AUTO: 0.21 K/UL (ref 0–0.04)
IMM GRANULOCYTES NFR BLD AUTO: 1.6 % (ref 0–0.5)
LYMPHOCYTES # BLD AUTO: 2.8 K/UL (ref 1–4.8)
LYMPHOCYTES NFR BLD: 21.5 % (ref 18–48)
MAGNESIUM SERPL-MCNC: 1.7 MG/DL (ref 1.6–2.6)
MCH RBC QN AUTO: 31 PG (ref 27–31)
MCHC RBC AUTO-ENTMCNC: 32.1 G/DL (ref 32–36)
MCV RBC AUTO: 97 FL (ref 82–98)
MONOCYTES # BLD AUTO: 1.1 K/UL (ref 0.3–1)
MONOCYTES NFR BLD: 8.6 % (ref 4–15)
NEUTROPHILS # BLD AUTO: 8.4 K/UL (ref 1.8–7.7)
NEUTROPHILS NFR BLD: 65.4 % (ref 38–73)
NRBC BLD-RTO: 0 /100 WBC
PLATELET # BLD AUTO: 483 K/UL (ref 150–350)
PMV BLD AUTO: 10.3 FL (ref 9.2–12.9)
POTASSIUM SERPL-SCNC: 4.2 MMOL/L (ref 3.5–5.1)
PROT SERPL-MCNC: 8.1 G/DL (ref 6–8.4)
RBC # BLD AUTO: 2.58 M/UL (ref 4–5.4)
SODIUM SERPL-SCNC: 136 MMOL/L (ref 136–145)
WBC # BLD AUTO: 12.84 K/UL (ref 3.9–12.7)

## 2020-03-10 PROCEDURE — 83735 ASSAY OF MAGNESIUM: CPT

## 2020-03-10 PROCEDURE — 85025 COMPLETE CBC W/AUTO DIFF WBC: CPT

## 2020-03-10 PROCEDURE — 87040 BLOOD CULTURE FOR BACTERIA: CPT | Mod: 59

## 2020-03-10 PROCEDURE — 63600175 PHARM REV CODE 636 W HCPCS: Performed by: INTERNAL MEDICINE

## 2020-03-10 PROCEDURE — 12000002 HC ACUTE/MED SURGE SEMI-PRIVATE ROOM

## 2020-03-10 PROCEDURE — 25000003 PHARM REV CODE 250: Performed by: INTERNAL MEDICINE

## 2020-03-10 PROCEDURE — 80053 COMPREHEN METABOLIC PANEL: CPT

## 2020-03-10 PROCEDURE — 87040 BLOOD CULTURE FOR BACTERIA: CPT

## 2020-03-10 PROCEDURE — 86140 C-REACTIVE PROTEIN: CPT

## 2020-03-10 PROCEDURE — 63600175 PHARM REV CODE 636 W HCPCS: Mod: JG | Performed by: INTERNAL MEDICINE

## 2020-03-10 RX ADMIN — CEFTAROLINE FOSAMIL 600 MG: 600 POWDER, FOR SOLUTION INTRAVENOUS at 04:03

## 2020-03-10 RX ADMIN — ENOXAPARIN SODIUM 40 MG: 100 INJECTION SUBCUTANEOUS at 04:03

## 2020-03-10 RX ADMIN — OXYCODONE HYDROCHLORIDE 5 MG: 5 TABLET ORAL at 09:03

## 2020-03-10 RX ADMIN — METHADONE HYDROCHLORIDE 30 MG: 10 TABLET ORAL at 08:03

## 2020-03-10 RX ADMIN — VANCOMYCIN HYDROCHLORIDE 1500 MG: 1 INJECTION, POWDER, LYOPHILIZED, FOR SOLUTION INTRAVENOUS at 12:03

## 2020-03-10 RX ADMIN — VANCOMYCIN HYDROCHLORIDE 1500 MG: 1 INJECTION, POWDER, LYOPHILIZED, FOR SOLUTION INTRAVENOUS at 11:03

## 2020-03-10 NOTE — CARE UPDATE
03/09/20 7413   Patient Assessment/Suction   Level of Consciousness (AVPU) alert   Respiratory Effort Normal;Unlabored   Expansion/Accessory Muscles/Retractions expansion symmetric;no retractions;no use of accessory muscles   All Lung Fields Breath Sounds clear   Rhythm/Pattern, Respiratory no shortness of breath reported;pattern regular;unlabored   PRE-TX-O2   O2 Device (Oxygen Therapy) nasal cannula   Flow (L/min) 0   SpO2 98 %   Pulse Oximetry Type Intermittent   $ Pulse Oximetry - Multiple Charge Pulse Oximetry - Multiple   Pulse 80   Resp 16   Respiratory Interventions   Cough And Deep Breathing done with encouragement   Breathing Techniques/Airway Clearance deep/controlled cough encouraged;diaphragmatic breathing promoted   Respiratory Evaluation   $ Care Plan Tech Time 15 min   Evaluation For Re-Eval 3 day   Home Oxygen   Has Home Oxygen? No   Home Aerosol, MDI, DPI, and Other Treatments/Therapies   Home Respiratory Therapy Per Patient/Review of Chart No

## 2020-03-10 NOTE — PROGRESS NOTES
Pharmacokinetic Initial Assessment: IV Vancomycin    Assessment/Plan:    Restart intravenous vancomycin with loading dose of 1500 mg once followed by a maintenance dose of vancomycin 1500 mg IV every 12 hours  Desired empiric serum trough concentration is 15 to 20 mcg/mL  Draw vancomycin trough level 30 min prior to fourth dose on 3/11 at approximately 23:00   Pharmacy will continue to follow and monitor vancomycin.      Please contact pharmacy at extension [phone] with any questions regarding this assessment.     Thank you for the consult,   Cinthia Billingsley       Patient brief summary:  Soraya Gee is a 34 y.o. female initiated on antimicrobial therapy with IV Vancomycin for treatment of suspected endocarditis    Drug Allergies:   Review of patient's allergies indicates:  No Known Allergies    Actual Body Weight:   80.7 kg    Renal Function:   Estimated Creatinine Clearance: 178.4 mL/min (based on SCr of 0.5 mg/dL).,     Dialysis Method (if applicable):  N/A    CBC (last 72 hours):  Recent Labs   Lab Result Units 03/08/20  0730 03/09/20  0359 03/10/20  0436   WBC K/uL 15.09* 14.65* 12.84*   Hemoglobin g/dL 8.7* 8.5* 8.0*   Hematocrit % 26.8* 26.7* 24.9*   Platelets K/uL 369* 437* 483*   Gran% % 71.1 70.0 65.4   Lymph% % 16.4* 17.8* 21.5   Mono% % 7.5 8.1 8.6   Eosinophil% % 1.7 1.6 2.2   Basophil% % 0.5 0.5 0.7   Differential Method  Automated Automated Automated       Metabolic Panel (last 72 hours):  Recent Labs   Lab Result Units 03/08/20  0730 03/09/20  0359 03/10/20  0436   Sodium mmol/L 134* 136 136   Potassium mmol/L 4.0 4.2 4.2   Chloride mmol/L 101 103 104   CO2 mmol/L 24 27 26   Glucose mg/dL 97 96 94   BUN, Bld mg/dL 11 13 15   Creatinine mg/dL 0.5 0.5 0.5   Albumin g/dL 2.7* 2.7* 2.6*   Total Bilirubin mg/dL 2.3* 2.0* 1.8*   Alkaline Phosphatase U/L 134 122 118   AST U/L 23 24 29   ALT U/L 28 29 33   Magnesium mg/dL 1.7 1.8 1.7       Drug levels (last 3 results):  No results for input(s): VANCOMYCINRA,  VANCOMYCINPE, VANCOMYCINTR in the last 72 hours.    Microbiologic Results:  Microbiology Results (last 7 days)       Procedure Component Value Units Date/Time    Blood culture [[id number]] Collected:  03/07/20 0656    Order Status:  Completed Specimen:  Blood Updated:  03/10/20 1032     Blood Culture, Routine No Growth to date      No Growth to date      No Growth to date      No Growth to date    Blood culture [[id number]] Collected:  03/05/20 0317    Order Status:  Completed Specimen:  Blood Updated:  03/10/20 0632     Blood Culture, Routine No growth after 5 days.    Blood culture [[id number]] Collected:  03/10/20 0420    Order Status:  Sent Specimen:  Blood from Line, PICC Right Brachial Updated:  03/10/20 0421    Blood culture [[id number]] Collected:  03/09/20 1622    Order Status:  Completed Specimen:  Blood from Line, PICC Right Basilic Updated:  03/09/20 2358     Blood Culture, Routine No Growth to date    Narrative:       Draw from picc    Blood culture [[id number]]  (Abnormal)  (Susceptibility) Collected:  03/06/20 0350    Order Status:  Completed Specimen:  Blood Updated:  03/09/20 0644     Blood Culture, Routine Gram stain aer bottle: Gram positive cocci      Results called to and read back by:Darius Olvera RN-[address] [place] [address];        03/07/2020  05:25 CJD      METHICILLIN RESISTANT STAPHYLOCOCCUS AUREUS  Known MRSA patient      Blood culture [[id number]] Collected:  03/04/20 0358    Order Status:  Completed Specimen:  Blood Updated:  03/09/20 0632     Blood Culture, Routine No growth after 5 days.    Blood culture [[id number]]     Order Status:  No result Specimen:  Blood     Blood culture [[id number]]  (Abnormal)  (Susceptibility) Collected:  03/02/20 0441    Order Status:  Completed Specimen:  Blood from Antecubital, Right Updated:  03/06/20 0744     Blood Culture, Routine Gram stain aer bottle: Gram positive cocci in clusters resembling Staph      Positive results previously called on [id number]      METHICILLIN  RESISTANT STAPHYLOCOCCUS AUREUS  Results called to and read back by: Dahlia Briscoe RN MICU  03/05/2020  14:01 by MERY      Blood culture [[id number]]  (Abnormal) Collected:  03/03/20 0502    Order Status:  Completed Specimen:  Blood Updated:  03/04/20 1119     Blood Culture, Routine Gram stain aer bottle: Gram positive cocci       Results called to and read back by: Israel Ledesma RN MICU.      METHICILLIN RESISTANT STAPHYLOCOCCUS AUREUS  For susceptibility see order #[id number]

## 2020-03-10 NOTE — CARE UPDATE
Alla from [facility] will be here to see patient after 9:00am today.

## 2020-03-10 NOTE — PLAN OF CARE
Problem: Adult Inpatient Plan of Care  Goal: Plan of Care Review  Outcome: Ongoing, Progressing  Goal: Patient-Specific Goal (Individualization)  Outcome: Ongoing, Progressing  Goal: Absence of Hospital-Acquired Illness or Injury  Outcome: Ongoing, Progressing  Goal: Optimal Comfort and Wellbeing  Outcome: Ongoing, Progressing  Goal: Readiness for Transition of Care  Outcome: Ongoing, Progressing  Goal: Rounds/Family Conference  Outcome: Ongoing, Progressing     Problem: Fall Injury Risk  Goal: Absence of Fall and Fall-Related Injury  Outcome: Ongoing, Progressing     Problem: Skin Injury Risk Increased  Goal: Skin Health and Integrity  Outcome: Ongoing, Progressing     Problem: Infection  Goal: Infection Symptom Resolution  Outcome: Ongoing, Progressing     Problem: Wound  Goal: Optimal Wound Healing  Outcome: Ongoing, Progressing     Problem: Restraint, Nonbehavioral (Nonviolent)  Goal: Discontinuation Criteria Achieved  Outcome: Ongoing, Progressing  Goal: Personal Dignity and Safety Maintained  Outcome: Ongoing, Progressing     Problem: Oral Intake Inadequate  Goal: Improved Oral Intake  Outcome: Ongoing, Progressing     Problem: Malnutrition  Goal: Improved Nutritional Intake  Outcome: Ongoing, Progressing

## 2020-03-10 NOTE — PROGRESS NOTES
"Progress Note  Infectious Disease    Reason for Consult:  Probable endocarditis    02/29/2020.  Consult note. Soraya Gee is a  appearing 34 y.o. female with past medical history of hepatitis-C, IV drug use, in present for 3 years, released in March 2019, that is when she resumed IV drugs.  She comes in complaining of shaking chills, fever, myalgia, malaise, generalized weakness, 3 days duration after injecting some "bad drugs".  Initially she thought she had cotton fever but the symptoms persisted so she came to ER.  Patient also complains of shortness of breath, she was tachycardic and tachypneic on admission.  She has noticed swelling throughout.  Her albumin is low  Patient is a IV drug abuse and uses heroin twice a day, for a total daily dose of 1 g.  No sick contacts.  She is admitted in ICU for closer observation to make sure she does not withdrawal.  Patient is tachycardic, tachypneic, hurting all over, her history is limited by although this    03/01/2020.  Withdrawal symptoms are improved with Precedex drip; although she still feels like she is breathing fast and she is hurting all over.  She spiked fever of 101.3.  Blood cultures came positive for GPC.  No urinary complaints, no new skin or joint complaint, no cough or phlegm.  03/02/2020.  She spiked another fever of 102.3.  All cultures are turning up to be MRSA  CRP improved from 20/5 down to 13.  WBC is still elevated.  She gets confused on and off but then she interacts appropriately.    3/3:  Chart reviewed and discussed with Dr. Mccormack.  Persistently positive blood cultures with MRSA, vancomycin EDDIE 1.  Persistently hectic fever and septic appearance, improve hyperbilirubinemia, right upper quadrant tenderness, generalized aches and pains, sedated a bit on Precedex and methadone, neurologically intact.  3/4: still febrile through late last night. 3/2 cultures negative, but 3/3 cultures have GPC. CT chest/abd/pelvis reviewed and septic pulmonary " "emboli are present, but no spleen, muscle or paraspinous lesions noted.  She is currently up in the chair, off Precedex.  We discussed her diagnosis and she is willing to receive the treatment.  Does not have much appetite but she will drink the in [place].  She still hurts everywhere and does have some pleurisy.  3/5: temperature improving. Blood cultures from yesterday are negative so far. She looks much improved, clearer mentally, still very uncomfortable, somewhat anxious. Not remembering information given yesterday. Expresses desire to "get off " the drugs. Still has no appetite. Drinking a little ensure. No diarrhea. Still splinting from pleurisy. Discussed with her about the NAVA. Length of therapy.   3/6: afebrile. Blood cultures neg x 2 days. Very anxious/hyperventilating, about NAVA. D/w anesthesia. Reassured her. No nausea, vomiting. Ok for picc line.  3/7: afebrile. VSS. 1 blood culture from yesterday drawn peripherally "left wrist" has GPC. I cannot confirm that it wasn't drawn from midline but site is different. NAVA demonstrated TV vegetation with flail leaflet and TR. No change in vision, no new MSK pain, or skin lesion. Still has some pleural discomfort associated with septic emboli.   3/8: 3/6 blood culture identified as Staph aureus. She went outside but now knows she is restricted. She has showered and put on make up. She is eating well. No diarrhea. Mild LE edema which she states is not new. No antibiotic intolerance. No change in vision, new MSK pain, new skin lesion  3/9: blood cultures not drawn until this afternoon. She feels well. Eating well. Would like to walk more. LTAC eval in progress.   3/10:  In great spirits, recent blood cultures negative.  Was visited by representative from the LTAC of her choice.  Changed over to vancomycin again.  No pleuritic complaints, appetite is good, no diarrhea, no problems with PICC line today.  No change in vision, no new musculoskeletal pain or skin " "lesions.    Antibiotics (From admission, onward)    Start     Stop Route Frequency Ordered    03/10/20 1202  vancomycin - pharmacy to dose  (vancomycin IVPB)      -- IV pharmacy to manage frequency 03/10/20 1102    03/10/20 1200  vancomycin (VANCOCIN) 1,500 mg in dextrose 5 % 500 mL IVPB     Note to Pharmacy:  Ht: 5' 8" (1.727 m)  Wt: 80.7 kg (177 lb 14.6 oz)  Estimated Creatinine Clearance: 178.4 mL/min (based on SCr of 0.5 mg/dL).      -- IV Every 12 hours (non-standard times) 03/10/20 1111        Antifungals (From admission, onward)    None        Antivirals (From admission, onward)    None            EXAM & DIAGNOSTICS REVIEWED:   Vitals:     Temp:  [98.1 °F (36.7 °C)-98.8 °F (37.1 °C)]   Temp: 98.5 °F (36.9 °C) (03/10/20 1200)  Pulse: 84 (03/10/20 1200)  Resp: 18 (03/10/20 1200)  BP: (!) 150/83 (03/10/20 1200)  SpO2: 97 % (03/10/20 1200)    Intake/Output Summary (Last 24 hours) at 3/10/2020 1502  Last data filed at 3/10/2020 0400  Gross per 24 hour   Intake 800 ml   Output --   Net 800 ml     Vitals:    03/10/20 1200   BP: (!) 150/83   Pulse: 84   Resp: 18   Temp: 98.5 °F (36.9 °C)         General:  Alert cooperative , looks and feels great   Eyes:  anicteric, PERRL, EOMI, no scleral conjunctival lesions  ENT:  No ulcers, exudates, thrush, nares patent, edentulous/dentures.     Neck:  Supple,   Lungs: clear  Heart:  Regular,  soft 1/6 systolic murmur  Abd:  Soft, ND, normal BS, less tender.  :  Voiding without difficulty  Musc:  No focal joint swelling, synovitis, myositis, moves all extremities  Skin:  Marks from prior IV use.   , without septic emboli, no subungual petechia   Wound:   Neuro:  speech fluent, face symmetric, moves all extremities, no focal weakness.   Psych:  Alert, pleasant, motivated  cooperative   Lymphatic:        Extrem:  No palpable edema  , phlebitis, cellulitis, warm and well perfused  VAD:   midline left arm 3/5 picc right on  Isolation:  Contact    Lines/Tubes/Drains:  Peripheral " IV    General Labs reviewed:  Recent Labs   Lab 03/08/20  0730 03/09/20  0359 03/10/20  0436   WBC 15.09* 14.65* 12.84*   HGB 8.7* 8.5* 8.0*   HCT 26.8* 26.7* 24.9*   * 437* 483*       Recent Labs   Lab 03/08/20  0730 03/09/20  0359 03/10/20  0436   * 136 136   K 4.0 4.2 4.2    103 104   CO2 24 27 26   BUN 11 13 15   CREATININE 0.5 0.5 0.5   CALCIUM 8.8 8.9 8.8   PROT 8.0 8.2 8.1   BILITOT 2.3* 2.0* 1.8*   ALKPHOS 134 122 118   ALT 28 29 33   AST 23 24 29       Recent Labs   Lab 03/05/20  0318 03/10/20  0436   CRP 10.18* 2.52*  2.52*             Micro:  Microbiology Results (last 7 days)     Procedure Component Value Units Date/Time    Blood culture [[id number]] Collected:  03/10/20 1155    Order Status:  Sent Specimen:  Blood from Line, PICC Right Basilic Updated:  03/10/20 1235    Blood culture [[id number]] Collected:  03/10/20 0420    Order Status:  Completed Specimen:  Blood from Line, PICC Right Brachial Updated:  03/10/20 1117     Blood Culture, Routine No Growth to date    Narrative:       Unit draw per picc line    Blood culture [[id number]] Collected:  03/07/20 0656    Order Status:  Completed Specimen:  Blood Updated:  03/10/20 1032     Blood Culture, Routine No Growth to date      No Growth to date      No Growth to date      No Growth to date    Blood culture [[id number]] Collected:  03/05/20 0317    Order Status:  Completed Specimen:  Blood Updated:  03/10/20 0632     Blood Culture, Routine No growth after 5 days.    Blood culture [[id number]] Collected:  03/09/20 1622    Order Status:  Completed Specimen:  Blood from Line, PICC Right Basilic Updated:  03/09/20 2358     Blood Culture, Routine No Growth to date    Narrative:       Draw from picc    Blood culture [[id number]]  (Abnormal)  (Susceptibility) Collected:  03/06/20 0350    Order Status:  Completed Specimen:  Blood Updated:  03/09/20 0644     Blood Culture, Routine Gram stain aer bottle: Gram positive cocci      Results called to  and read back by:Darius Olvera [id number];        03/07/2020  05:25 CJD      METHICILLIN RESISTANT STAPHYLOCOCCUS AUREUS  Known MRSA patient      Blood culture [[id number]] Collected:  03/04/20 0358    Order Status:  Completed Specimen:  Blood Updated:  03/09/20 0632     Blood Culture, Routine No growth after 5 days.    Blood culture [[id number]]  (Abnormal)  (Susceptibility) Collected:  03/02/20 0441    Order Status:  Completed Specimen:  Blood from Antecubital, Right Updated:  03/06/20 0744     Blood Culture, Routine Gram stain aer bottle: Gram positive cocci in clusters resembling Staph      Positive results previously called on [id number]      METHICILLIN RESISTANT STAPHYLOCOCCUS AUREUS  Results called to and read back by: Dahlia Briscoe RN MICU  03/05/2020  14:01 by DRP      Blood culture [[id number]]  (Abnormal) Collected:  03/03/20 0502    Order Status:  Completed Specimen:  Blood Updated:  03/04/20 1119     Blood Culture, Routine Gram stain aer bottle: Gram positive cocci       Results called to and read back by: Israel Ledesma RN MICU.      METHICILLIN RESISTANT STAPHYLOCOCCUS AUREUS  For susceptibility see order #[id number]          Imaging Reviewed:  Chest x-rays    CXR Mild central hilar bronchovascular crowding, possibly secondary to low lung volumes and atelectasis or very mild edema, or atypical infection/bronchitis as above    US1.  Sludge seen in the gallbladder, without findings to specifically suggest acute cholecystitis (noting borderline gallbladder wall thickening).  2.  Rounded echogenic focus in the right lobe of the liver, with imaging features of an hepatic hemangioma, consider ultrasound follow-up in 6 months to document stability.  3.  Numerous small periportal lymph nodes, likely reactive/inflammatory in nature.  Consider correlation with liver function tests and hepatitis serologies.    CT chest/abd/pelvis 3/3  Bilateral diffuse nodular predominantly pleural based opacities without  cavitation.  Findings are most consistent with septic emboli.  Multifocal pneumonia could not be excluded.  There is bibasilar airspace disease which may represent atelectasis or infiltrates with small bilateral pleural effusions  Mild hepatosplenomegaly.  The hyperechoic mass in the right lobe of the liver is not seen on CT and most likely represents hemangioma  No acute abnormality within the abdomen or pelvis    Cardiology:    Transthoracic echo 3/1  Left Ventricle Normal ejection fraction at 60%. Normal left ventricular cavity size. Normal wall thickness observed. Normal left ventricular diastolic function. Normal left atrial pressure. No wall motion abnormalities.   Right Ventricle Normal cavity size, wall thickness and systolic function. Wall motion normal.   Left Atrium The left atrium is normal.   Right Atrium There is normal right atrial size.   Aortic Valve The aortic valve appears structurally normal. There is no stenosis. No regurgitation. There is normal leaflet mobility.The LVOT diameter is 2.02 cm.   Mitral Valve The mitral valve appears structurally normal. There is normal leaflet mobility. No regurgitation.   Tricuspid Valve The tricuspid valve appears structurally normal. No stenosis. Trace regurgitation. There is normal leaflet mobility. The estimated PA systolic pressure is 26 mmHg.   Pulmonic Valve The pulmonic valve was not well visualized. No stenosis. No regurgitation.   IVC/SVC Normal central venous pressure (3 mm Hg).   Ascending Aorta The aortic root and ascending aorta are normal in size.   Pericardium No pericardial effusion.     NAVA 3/6:  1. Aortic valve is structurally normal with good leaflet excursion. No significant regurgitation.   2. Mitral valve is structurally normal with good leaflet excursion. Trace regurgitation.   3. Tricuspid valve: There appears to be a small vegetation (0.9 X 0.4 cm) attached to the base of the septal leaflet. There also appears to be prolapse and flail  of the leaflets causing severe eccentric tricuspid regurgitation.   4. Pulmonary valve is structurally normal with good leaflet excursion. No significant regurgitation.  5. Overall LVEF and RVEF appears normal.   6. No obvious shunt across the interatrial septum by color flow Doppler and agitated saline.     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 Septic pulmonary emboli   Flail valve leaflet with tricuspid regurg  Elevated markers of inflammation including procal, CRP, ESR. improving  TELLY. resolved   Epigastric right upper quadrant discomfort and generalized abdominal discomfort.-resolved  Jaundice Transaminitis, heavy opiate use, H/o Hep C, untreated, ammonia normal:  Bilirubin improved  IVDU. heroin  Anemia   Protein malnutrition.  Hypoalbuminemia =  2.6  anxiety  This patient is high risk for life-threatening deterioration and death secondary to above comorbidities and need for IV treatment.      Recommendations:  Converting back to vancomycin  End date of antibiotics: 4/4   LTAC placment in progress    Prognosis is good

## 2020-03-10 NOTE — PROGRESS NOTES
"[facility]  Adult Nutrition   Progress Note (Follow-Up)    SUMMARY     Recommendations  Recommendation/Intervention: 1. Continue diet and supplements as tolerated 2.  to assist with meal choices daily 3. Rec'd thiamine, folic acid, MVI supplementation  Goals: 1. Pt to meet at least 75% estimated needs via PO diet/supplements   Nutrition Goal Status: progressing towards goal  Communication of RD Recs: discussed on rounds    Dietitian Rounds Brief    Pt seen for f/u. Intake improving. Consuming >75% meals and 100% ONS. Tolerating diet w/o any complaints. No N/V. LBM 3/9. + antbx infusing. Plans to go to LTAC upon discharge. RD obtained prefs--will communicate to kitchen. Continue ONS to aid in meeting needs.    Reason for Assessment  Reason For Assessment: RD follow-up    Nutrition Risk Screen  Nutrition Risk Screen: no indicators present  [REMOVED]      Wound 20 0400 medial Buttocks-Wound Image: Images linked  MST Score: 0  Have you recently lost weight without trying?: No  Weight loss score: 0  Have you been eating poorly because of a decreased appetite?: No  Appetite score: 0       Nutrition/Diet History  Patient Reported Diet/Restrictions/Preferences: general  Typical Food/Fluid Intake: fair   Spiritual, Cultural Beliefs, Synagogue Practices, Values that Affect Care: no  Food Allergies: NKFA  Factors Affecting Nutritional Intake: None identified at this time    Anthropometrics  Temp: 98.3 °F (36.8 °C)  Height Method: Stated  Height: 5' 8" (172.7 cm)  Height (inches): 68 in  Weight Method: Bed Scale  Weight: 80.7 kg (177 lb 14.6 oz)  Weight (lb): 177.91 lb  Ideal Body Weight (IBW), Female: 140 lb  % Ideal Body Weight, Female (lb): 133.07 %  BMI (Calculated): 27.1  BMI Grade: 25 - 29.9 - overweight  Weight Loss: unintentional  Usual Body Weight (UBW), k.4 kg(UBW = about 190 lbs per patient )  % Usual Body Weight: 93.6  % Weight Change From Usual Weight: -6.6 %       Weight " History:  Wt Readings from Last 10 Encounters:   03/05/20 80.7 kg (177 lb 14.6 oz)   08/19/19 87.5 kg (193 lb)       Lab/Procedures/Meds: Pertinent Labs Reviewed  Clinical Chemistry:  Recent Labs   Lab 03/10/20  0436      K 4.2      CO2 26   GLU 94   BUN 15   CREATININE 0.5   CALCIUM 8.8   PROT 8.1   ALBUMIN 2.6*   BILITOT 1.8*   ALKPHOS 118   AST 29   ALT 33   ANIONGAP 6*   ESTGFRAFRICA >60.0   EGFRNONAA >60.0   MG 1.7     CBC:   Recent Labs   Lab 03/10/20  0436   WBC 12.84*   RBC 2.58*   HGB 8.0*   HCT 24.9*   *   MCV 97   MCH 31.0   MCHC 32.1     Lipid Panel:  No results for input(s): CHOL, HDL, LDLCALC, TRIG, CHOLHDL in the last 168 hours.  Cardiac Profile:  No results for input(s): BNP, CPK, CPKMB, TROPONINI, CKTOTAL in the last 168 hours.  Inflammatory Labs:  Recent Labs   Lab 03/05/20  0318 03/10/20  0436   CRP 10.18* 2.52*  2.52*     Diabetes:  No results for input(s): HGBA1C, POCTGLUCOSE in the last 168 hours.  Thyroid & Parathyroid:  No results for input(s): TSH, FREET4, E6QVZGX, G0XHQOY, THYROIDAB in the last 168 hours.  Vitamins:  No results for input(s): [id number], [id number] in the last 168 hours.    Medications: Pertinent Medications reviewed  Scheduled Meds:   ceftaroline (TEFLARO) IVPB  600 mg Intravenous Q8H    DAPTOmycin (CUBICIN)  IV  8 mg/kg Intravenous Q24H    enoxaparin  40 mg Subcutaneous Daily    methadone  30 mg Oral Daily     Continuous Infusions:  PRN Meds:.acetaminophen, calcium chloride IVPB, calcium chloride IVPB, calcium chloride IVPB, ibuprofen, lorazepam, magnesium oxide, magnesium sulfate IVPB, magnesium sulfate IVPB, magnesium sulfate IVPB, magnesium sulfate IVPB, ondansetron, oxyCODONE, potassium chloride in water, potassium chloride in water, potassium chloride in water, potassium chloride in water, potassium chloride, potassium chloride, potassium chloride, potassium chloride, DIPH,PERTUS (ADACEL),TETANUS PF VAC (ADULT)    Estimated/Assessed  Needs  Weight Used For Calorie Calculations: 80.7 kg (177 lb 14.6 oz)  Energy Calorie Requirements (kcal): 6504-5654 kcals/day (25-30 kcals/kg)  Energy Need Method: Kcal/kg  Protein Requirements:  g/day (1.2-1.5 g/kg)  Weight Used For Protein Calculations: 80.7 kg (177 lb 14.6 oz)     Estimated Fluid Requirement Method: RDA Method  RDA Method (mL): 2018       Nutrition Prescription Ordered  Current Diet Order: regular  Oral Nutrition Supplement: Ensure Enlive TID (1050 kcal, 60 g pro)    Evaluation of Received Nutrient/Fluid Intake  Energy Calories Required: meeting needs  Protein Required: meeting needs  Fluid Required: meeting needs  Tolerance: tolerating     Intake/Output Summary (Last 24 hours) at 3/10/2020 0944  Last data filed at 3/10/2020 0400  Gross per 24 hour   Intake 800 ml   Output --   Net 800 ml        % Meal Intake: 75 - 100 %    Nutrition Risk  Level of Risk/Frequency of Follow-up: moderate - high     Monitor and Evaluation  Food and Nutrient Intake: food and beverage intake, energy intake  Food and Nutrient Adminstration: diet order  Physical Activity and Function: nutrition-related ADLs and IADLs  Anthropometric Measurements: weight change, weight  Biochemical Data, Medical Tests and Procedures: electrolyte and renal panel, gastrointestinal profile, glucose/endocrine profile  Nutrition-Focused Physical Findings: overall appearance, extremities, muscles and bones     Nutrition Follow-Up  RD Follow-up?: Yes    Adri Wiley RD 03/10/2020 9:44 AM

## 2020-03-11 LAB
ALBUMIN SERPL BCP-MCNC: 2.6 G/DL (ref 3.5–5.2)
ALP SERPL-CCNC: 105 U/L (ref 55–135)
ALT SERPL W/O P-5'-P-CCNC: 40 U/L (ref 10–44)
ANION GAP SERPL CALC-SCNC: 5 MMOL/L (ref 8–16)
AST SERPL-CCNC: 32 U/L (ref 10–40)
BASOPHILS # BLD AUTO: 0.09 K/UL (ref 0–0.2)
BASOPHILS NFR BLD: 0.9 % (ref 0–1.9)
BILIRUB SERPL-MCNC: 1.6 MG/DL (ref 0.1–1)
BUN SERPL-MCNC: 16 MG/DL (ref 6–20)
CALCIUM SERPL-MCNC: 8.9 MG/DL (ref 8.7–10.5)
CHLORIDE SERPL-SCNC: 105 MMOL/L (ref 95–110)
CO2 SERPL-SCNC: 27 MMOL/L (ref 23–29)
CREAT SERPL-MCNC: 0.7 MG/DL (ref 0.5–1.4)
DIFFERENTIAL METHOD: ABNORMAL
EOSINOPHIL # BLD AUTO: 0.3 K/UL (ref 0–0.5)
EOSINOPHIL NFR BLD: 3 % (ref 0–8)
ERYTHROCYTE [DISTWIDTH] IN BLOOD BY AUTOMATED COUNT: 14.9 % (ref 11.5–14.5)
EST. GFR  (AFRICAN AMERICAN): >60 ML/MIN/1.73 M^2
EST. GFR  (NON AFRICAN AMERICAN): >60 ML/MIN/1.73 M^2
GLUCOSE SERPL-MCNC: 90 MG/DL (ref 70–110)
HCT VFR BLD AUTO: 24.6 % (ref 37–48.5)
HGB BLD-MCNC: 8 G/DL (ref 12–16)
IMM GRANULOCYTES # BLD AUTO: 0.13 K/UL (ref 0–0.04)
IMM GRANULOCYTES NFR BLD AUTO: 1.3 % (ref 0–0.5)
LYMPHOCYTES # BLD AUTO: 2.8 K/UL (ref 1–4.8)
LYMPHOCYTES NFR BLD: 27.8 % (ref 18–48)
MAGNESIUM SERPL-MCNC: 1.7 MG/DL (ref 1.6–2.6)
MCH RBC QN AUTO: 31.6 PG (ref 27–31)
MCHC RBC AUTO-ENTMCNC: 32.5 G/DL (ref 32–36)
MCV RBC AUTO: 97 FL (ref 82–98)
MONOCYTES # BLD AUTO: 1 K/UL (ref 0.3–1)
MONOCYTES NFR BLD: 9.8 % (ref 4–15)
NEUTROPHILS # BLD AUTO: 5.8 K/UL (ref 1.8–7.7)
NEUTROPHILS NFR BLD: 57.2 % (ref 38–73)
NRBC BLD-RTO: 0 /100 WBC
PLATELET # BLD AUTO: 498 K/UL (ref 150–350)
PMV BLD AUTO: 9.9 FL (ref 9.2–12.9)
POTASSIUM SERPL-SCNC: 4.1 MMOL/L (ref 3.5–5.1)
PROT SERPL-MCNC: 8 G/DL (ref 6–8.4)
RBC # BLD AUTO: 2.53 M/UL (ref 4–5.4)
SODIUM SERPL-SCNC: 137 MMOL/L (ref 136–145)
VANCOMYCIN TROUGH SERPL-MCNC: 14.5 UG/ML (ref 10–22)
WBC # BLD AUTO: 10.08 K/UL (ref 3.9–12.7)

## 2020-03-11 PROCEDURE — 12000002 HC ACUTE/MED SURGE SEMI-PRIVATE ROOM

## 2020-03-11 PROCEDURE — 25000003 PHARM REV CODE 250: Performed by: INTERNAL MEDICINE

## 2020-03-11 PROCEDURE — 83735 ASSAY OF MAGNESIUM: CPT

## 2020-03-11 PROCEDURE — 85025 COMPLETE CBC W/AUTO DIFF WBC: CPT

## 2020-03-11 PROCEDURE — 63600175 PHARM REV CODE 636 W HCPCS: Performed by: INTERNAL MEDICINE

## 2020-03-11 PROCEDURE — 80053 COMPREHEN METABOLIC PANEL: CPT

## 2020-03-11 PROCEDURE — 80202 ASSAY OF VANCOMYCIN: CPT

## 2020-03-11 RX ORDER — MAGNESIUM SULFATE HEPTAHYDRATE 40 MG/ML
2 INJECTION, SOLUTION INTRAVENOUS ONCE
Status: DISCONTINUED | OUTPATIENT
Start: 2020-03-11 | End: 2020-03-11

## 2020-03-11 RX ADMIN — ENOXAPARIN SODIUM 40 MG: 100 INJECTION SUBCUTANEOUS at 05:03

## 2020-03-11 RX ADMIN — ACETAMINOPHEN 650 MG: 325 TABLET ORAL at 09:03

## 2020-03-11 RX ADMIN — MAGNESIUM SULFATE 2 G: 2 INJECTION INTRAVENOUS at 05:03

## 2020-03-11 RX ADMIN — METHADONE HYDROCHLORIDE 30 MG: 10 TABLET ORAL at 08:03

## 2020-03-11 RX ADMIN — VANCOMYCIN HYDROCHLORIDE 1500 MG: 1 INJECTION, POWDER, LYOPHILIZED, FOR SOLUTION INTRAVENOUS at 11:03

## 2020-03-11 NOTE — NURSING
5 beat run of Vtach, no sob,  is aware, will cont to monitor, pt had 2 more run of Vtach, Dr Doty contacted and order received for mg 2 gram, infusing now

## 2020-03-11 NOTE — PROGRESS NOTES
[facility] Medicine  Progress Note    Patient Name: Soraya Gee  MRN: [id number]  Patient Class: IP- Inpatient   Admission Date: 2/29/2020  2:42 PM  Attending Physician: Stacie Fitzgerald MD  Primary Care Provider: Jus Griggs Iii, MD  Face-to-Face encounter date: 03/10/2020     Active Hospital Problems    Diagnosis  POA    *Acute endocarditis [I33.9]  Yes    Endocarditis [I38]  Yes    IVDU (intravenous drug user) [F19.90]  Yes    Hepatitis C [B19.20]  Yes    TELLY (acute kidney injury) [N17.9]  Yes      Resolved Hospital Problems   No resolved problems to display.      Assessment & Plan:   Soraya Gee is a 34 y.o. female with:    MRSA Endocarditis with persistent Bacteremia  Septic Pulmonary Emboli  - per ID recs, pt now back on vancomycin  - end date abx: 4/4/2020  - LTAC placement in progress    IVDU / heroin -abuse  - methadone  - will need outpt follow-up    Moderate Protein Malnutrition 2/2 IV Drug Abuse  - nutrition following    HCV  - pt will need outpt follow-up    Other chronic conditions:  Home meds as appropriate  Electrolyte derangement:  Trending BMP, Mg; Replacement prn  DVT ppx:  lovenox  FULL CODE    Discharge Planning:     LTAC in progress    Subjective:      HPI:    34-year-old patient getting admitted with suspected sepsis from acute infective endocarditis  Patient is a IV drug abuse and uses heroin on a daily basis  For the past 2 days she has been having fever/myalgia/malaise and generalized weakness all over the body  Later she started having shortness of breath and patient did notice that her legs are getting more swollen  Because of the persistence of symptoms she came to the emergency room and got admitted  Patient initially thought she might have got cotton fever  Per patient if she will not take heroin every day she will develop severe withdrawal symptoms  No other issues.  She lives alone but has got a boyfriend who lives very nearby    Hospital  "Course:    Patient was admitted with most likely bacterial endocarditis  She has daily urine IV drug user.  Methadone started yesterday and increased the dose further  Still having persistent fevers, blood culture with persistent  MRSA  Seen and examined the patient she is awake alert oriented and able to answer all the questions, off Precedex  Patient is going to need NAVA     3/3: Ms Gee is sedated on precedex and methadone. Pt arouses but has limited response to questions but she did move all extremities,     3/4: Pt is awake and alert with c/o 8/10 flank pain. Pt has 4/10 pain with oxycontin for break thru pain. She has no neurologic complaints. I have discussed the case with Dr Rodriguez and appreciate her imput. Pt nurse and I discussed the need for rehab Re opiate dependency.     3/5: Pt states that the methadone really helps her pain and opiate cravings. She has taken only 1 oxycodone today. Pt with seek treatment at a methadone clinic post discharge. Her side pain has improved from an 8/10 to < 4/10. Pt has improved enough to be sent to telemetry     3/6: Ms Elizondo has no complaints. Case discussed with Dr Rodriguez may go to an LTiiAC for further therapy. I will consult  for the above     3/7: I am feeling better, my side pain is low( 4/10 at worst) . Pt agrees to LTAC placement     3/8: The patient is achy but comfortable with pain < 4/10    3/10:  No pain today.  No SOB.  R ankle edema noted.    Review of Systems   All systems reviewed and are negative except as noted per above.  Objective:     Physical Exam  /84   Pulse 91   Temp 98.2 °F (36.8 °C) (Oral)   Resp 18   Ht 5' 8" (1.727 m)   Wt 80.7 kg (177 lb 14.6 oz)   LMP  (LMP Unknown)   SpO2 99%   Breastfeeding? No   BMI 27.05 kg/m²     Gen: alert, responsive   HEENT:  Eyes - no pallor  External ears with no lesions  Nares patent  Mouth - lips chapped  CV: RRR, +murmur  Lungs: CTA B/L  Abd: +BS, soft, NT, ND  Ext: R ankle " edema  Skin: warm, dry  Neuro: grossly intact  Psych: pleasant    Recent Labs   Lab 03/10/20  0436   WBC 12.84*   HGB 8.0*   HCT 24.9*   *     Recent Labs   Lab 03/10/20  0436   CALCIUM 8.8   ALBUMIN 2.6*   PROT 8.1      K 4.2   CO2 26      BUN 15   CREATININE 0.5   ALKPHOS 118   ALT 33   AST 29   BILITOT 1.8*     No results found for: POCTGLUCOSE   Microbiology Results (last 7 days)     Procedure Component Value Units Date/Time    Blood culture [[id number]] Collected:  03/10/20 1155    Order Status:  Completed Specimen:  Blood from Line, PICC Right Basilic Updated:  03/10/20 1917     Blood Culture, Routine No Growth to date    Narrative:       Draw from picc    Blood culture [[id number]] Collected:  03/09/20 1622    Order Status:  Completed Specimen:  Blood from Line, PICC Right Basilic Updated:  03/10/20 1832     Blood Culture, Routine No Growth to date      No Growth to date    Narrative:       Draw from picc    Blood culture [[id number]] Collected:  03/10/20 0420    Order Status:  Completed Specimen:  Blood from Line, PICC Right Brachial Updated:  03/10/20 1117     Blood Culture, Routine No Growth to date    Narrative:       Unit draw per picc line    Blood culture [[id number]] Collected:  03/07/20 0656    Order Status:  Completed Specimen:  Blood Updated:  03/10/20 1032     Blood Culture, Routine No Growth to date      No Growth to date      No Growth to date      No Growth to date    Blood culture [[id number]] Collected:  03/05/20 0317    Order Status:  Completed Specimen:  Blood Updated:  03/10/20 0632     Blood Culture, Routine No growth after 5 days.    Blood culture [[id number]]  (Abnormal)  (Susceptibility) Collected:  03/06/20 0350    Order Status:  Completed Specimen:  Blood Updated:  03/09/20 0644     Blood Culture, Routine Gram stain aer bottle: Gram positive cocci      Results called to and read back by:Darius Olvera [id number];        03/07/2020  05:25 CJD      METHICILLIN RESISTANT  STAPHYLOCOCCUS AUREUS  Known MRSA patient      Blood culture [[id number]] Collected:  03/04/20 0358    Order Status:  Completed Specimen:  Blood Updated:  03/09/20 0632     Blood Culture, Routine No growth after 5 days.    Blood culture [[id number]]  (Abnormal)  (Susceptibility) Collected:  03/02/20 0441    Order Status:  Completed Specimen:  Blood from Antecubital, Right Updated:  03/06/20 0744     Blood Culture, Routine Gram stain aer bottle: Gram positive cocci in clusters resembling Staph      Positive results previously called on [id number]      METHICILLIN RESISTANT STAPHYLOCOCCUS AUREUS  Results called to and read back by: Dahlia Briscoe RN MICU  03/05/2020  14:01 by [email or web address]      Blood culture [[id number]]  (Abnormal) Collected:  03/03/20 0502    Order Status:  Completed Specimen:  Blood Updated:  03/04/20 1119     Blood Culture, Routine Gram stain aer bottle: Gram positive cocci       Results called to and read back by: Israel Ledesma RN MICU.      METHICILLIN RESISTANT STAPHYLOCOCCUS AUREUS  For susceptibility see order #[id number]          X-Ray Chest AP Portable   Final Result      Interval insertion of a right PICC as above         Electronically signed by: Cooper Coronel MD   Date:    03/06/2020   Time:    10:21      CT Chest Abdomen Pelvis With Contrast   Final Result      Bilateral diffuse nodular predominantly pleural based opacities without cavitation.  Findings are most consistent with septic emboli.  Multifocal pneumonia could not be excluded.  There is bibasilar airspace disease which may represent atelectasis or infiltrates with small bilateral pleural effusions      Mild hepatosplenomegaly.  The hyperechoic mass in the right lobe of the liver is not seen on CT and most likely represents hemangioma      No acute abnormality within the abdomen or pelvis         Electronically signed by: Sun Todd MD   Date:    03/03/2020   Time:    12:12      CT Head Without Contrast   Final Result      1. No acute  intracranial abnormality.   2. Prominent focus of CSF density measuring 9 x 25 mm in anteromedial left middle cranial fossa.  This most likely represents an incidental arachnoid cyst.  Further imaging evaluation with outpatient MRI brain without and with IV contrast is suggested.         Electronically signed by: Gallito Carmona MD   Date:    03/02/2020   Time:    17:24      X-Ray Chest AP Portable   Final Result      Improvement of the central hilar interstitial opacities with faint ground-glass opacities in lung bases suggestive of resolving edema         Electronically signed by: Sun Todd MD   Date:    03/02/2020   Time:    08:55      US Abdomen Limited   Final Result      1.  Sludge seen in the gallbladder, without findings to specifically suggest acute cholecystitis (noting borderline gallbladder wall thickening).      2.  Rounded echogenic focus in the right lobe of the liver, with imaging features of an hepatic hemangioma, consider ultrasound follow-up in 6 months to document stability.      3.  Numerous small periportal lymph nodes, likely reactive/inflammatory in nature.  Consider correlation with liver function tests and hepatitis serologies.      .         Electronically signed by: Daryl Kyle MD   Date:    02/29/2020   Time:    20:01      X-Ray Chest AP Portable   Final Result      Mild central hilar bronchovascular crowding, possibly secondary to low lung volumes and atelectasis or very mild edema, or atypical infection/bronchitis as above         Electronically signed by: Daryl Kyle MD   Date:    02/29/2020   Time:    15:28      X-Ray Chest AP Portable    (Results Pending)       All labs, images, and other studies - including those not included in this note -- were reviewed personally by me.    Inpatient medications  Scheduled Meds:   enoxaparin  40 mg Subcutaneous Daily    methadone  30 mg Oral Daily    vancomycin (VANCOCIN) IVPB  1,500 mg Intravenous Q12H     Continuous  Infusions:  PRN Meds:.acetaminophen, calcium chloride IVPB, calcium chloride IVPB, calcium chloride IVPB, ibuprofen, lorazepam, magnesium oxide, magnesium sulfate IVPB, magnesium sulfate IVPB, magnesium sulfate IVPB, magnesium sulfate IVPB, ondansetron, oxyCODONE, potassium chloride in water, potassium chloride in water, potassium chloride in water, potassium chloride in water, potassium chloride, potassium chloride, potassium chloride, potassium chloride, DIPH,PERTUS (ADACEL),TETANUS PF VAC (ADULT), Pharmacy to dose Vancomycin consult **AND** vancomycin - pharmacy to dose    Above encounter included review of the medical records, interviewing and examining the patient face-to-face, discussion with family and other health care providers, ordering and interpreting lab/test results and formulating a plan of care.     Stacie Fitzgerald MD  [facility] Hospitalist

## 2020-03-11 NOTE — PROGRESS NOTES
[facility] Medicine  Progress Note    Patient Name: Soraya Gee  MRN: [id number]  Patient Class: IP- Inpatient   Admission Date: 2/29/2020  2:42 PM  Attending Physician: Stacie Fitzgerald MD  Primary Care Provider: Jus Griggs Iii, MD  Face-to-Face encounter date: 03/11/2020     Active Hospital Problems    Diagnosis  POA    *Acute endocarditis [I33.9]  Yes    Endocarditis [I38]  Yes    IVDU (intravenous drug user) [F19.90]  Yes    Hepatitis C [B19.20]  Yes    TELLY (acute kidney injury) [N17.9]  Yes      Resolved Hospital Problems   No resolved problems to display.      Assessment & Plan:   Soraya Gee is a 34 y.o. female with:    MRSA Endocarditis with persistent Bacteremia  Septic Pulmonary Emboli  - per ID recs, pt now back on vancomycin  - end date abx: 4/4/2020  - LTAC placement in progress    IVDU / heroin -abuse  - methadone  - will need outpt follow-up    Moderate Protein Malnutrition 2/2 IV Drug Abuse  - nutrition following    HCV  - pt will need outpt follow-up    Other chronic conditions:  Home meds as appropriate  Electrolyte derangement:  Trending BMP, Mg; Replacement prn  DVT ppx:  lovenox  FULL CODE    Discharge Planning:     LTAC in progress    Subjective:      HPI:    34-year-old patient getting admitted with suspected sepsis from acute infective endocarditis  Patient is a IV drug abuse and uses heroin on a daily basis  For the past 2 days she has been having fever/myalgia/malaise and generalized weakness all over the body  Later she started having shortness of breath and patient did notice that her legs are getting more swollen  Because of the persistence of symptoms she came to the emergency room and got admitted  Patient initially thought she might have got cotton fever  Per patient if she will not take heroin every day she will develop severe withdrawal symptoms  No other issues.  She lives alone but has got a boyfriend who lives very nearby    Hospital  "Course:    Patient was admitted with most likely bacterial endocarditis  She has daily urine IV drug user.  Methadone started yesterday and increased the dose further  Still having persistent fevers, blood culture with persistent  MRSA  Seen and examined the patient she is awake alert oriented and able to answer all the questions, off Precedex  Patient is going to need NAVA     3/3: Ms Gee is sedated on precedex and methadone. Pt arouses but has limited response to questions but she did move all extremities,     3/4: Pt is awake and alert with c/o 8/10 flank pain. Pt has 4/10 pain with oxycontin for break thru pain. She has no neurologic complaints. I have discussed the case with Dr Rodriguez and appreciate her imput. Pt nurse and I discussed the need for rehab Re opiate dependency.     3/5: Pt states that the methadone really helps her pain and opiate cravings. She has taken only 1 oxycodone today. Pt with seek treatment at a methadone clinic post discharge. Her side pain has improved from an 8/10 to < 4/10. Pt has improved enough to be sent to telemetry     3/6: Ms Elizondo has no complaints. Case discussed with Dr Rodriguez may go to an LTiiAC for further therapy. I will consult  for the above     3/7: I am feeling better, my side pain is low( 4/10 at worst) . Pt agrees to LTAC placement     3/8: The patient is achy but comfortable with pain < 4/10    3/10:  No pain today.  No SOB.  R ankle edema noted.    3/11:  Pt resting comfortably.  No pain.  No SOB.  No dizziness.    Review of Systems   All systems reviewed and are negative except as noted per above.  Objective:     Physical Exam  /63 (BP Location: Right arm, Patient Position: Lying)   Pulse 81   Temp 98.5 °F (36.9 °C) (Oral)   Resp 18   Ht 5' 8" (1.727 m)   Wt 80.7 kg (177 lb 14.6 oz)   LMP  (LMP Unknown)   SpO2 98%   Breastfeeding? No   BMI 27.05 kg/m²     Gen: alert, responsive   HEENT:  Eyes - no pallor  External ears with no " lesions  Nares patent  Mouth - lips chapped  CV: RRR, +murmur  Lungs: CTA B/L  Abd: +BS, soft, NT, ND  Ext: R ankle edema  Skin: warm, dry  Neuro: grossly intact  Psych: pleasant    Recent Labs   Lab 03/11/20  0442   WBC 10.08   HGB 8.0*   HCT 24.6*   *     Recent Labs   Lab 03/11/20  0442   CALCIUM 8.9   ALBUMIN 2.6*   PROT 8.0      K 4.1   CO2 27      BUN 16   CREATININE 0.7   ALKPHOS 105   ALT 40   AST 32   BILITOT 1.6*     No results found for: POCTGLUCOSE   Microbiology Results (last 7 days)     Procedure Component Value Units Date/Time    Blood culture [[id number]] Collected:  03/07/20 0656    Order Status:  Completed Specimen:  Blood Updated:  03/11/20 1032     Blood Culture, Routine No Growth to date      No Growth to date      No Growth to date      No Growth to date      No Growth to date    Blood culture [[id number]] Collected:  03/10/20 0420    Order Status:  Completed Specimen:  Blood from Line, PICC Right Brachial Updated:  03/11/20 0632     Blood Culture, Routine No Growth to date      No Growth to date    Narrative:       Unit draw per picc line    Blood culture [[id number]] Collected:  03/10/20 1155    Order Status:  Completed Specimen:  Blood from Line, PICC Right Basilic Updated:  03/10/20 1917     Blood Culture, Routine No Growth to date    Narrative:       Draw from picc    Blood culture [[id number]] Collected:  03/09/20 1622    Order Status:  Completed Specimen:  Blood from Line, PICC Right Basilic Updated:  03/10/20 1832     Blood Culture, Routine No Growth to date      No Growth to date    Narrative:       Draw from picc    Blood culture [[id number]] Collected:  03/05/20 0317    Order Status:  Completed Specimen:  Blood Updated:  03/10/20 0632     Blood Culture, Routine No growth after 5 days.    Blood culture [[id number]]  (Abnormal)  (Susceptibility) Collected:  03/06/20 0350    Order Status:  Completed Specimen:  Blood Updated:  03/09/20 0644     Blood Culture, Routine Gram  stain aer bottle: Gram positive cocci      Results called to and read back by:Darius Olvera [id number];        03/07/2020  05:25 CJD      METHICILLIN RESISTANT STAPHYLOCOCCUS AUREUS  Known MRSA patient      Blood culture [[id number]] Collected:  03/04/20 0358    Order Status:  Completed Specimen:  Blood Updated:  03/09/20 0632     Blood Culture, Routine No growth after 5 days.    Blood culture [[id number]]  (Abnormal)  (Susceptibility) Collected:  03/02/20 0441    Order Status:  Completed Specimen:  Blood from Antecubital, Right Updated:  03/06/20 0744     Blood Culture, Routine Gram stain aer bottle: Gram positive cocci in clusters resembling Staph      Positive results previously called on [id number]      METHICILLIN RESISTANT STAPHYLOCOCCUS AUREUS  Results called to and read back by: Dahlia Briscoe RN MICU  03/05/2020  14:01 by DRP          X-Ray Chest AP Portable   Final Result      Interval insertion of a right PICC as above         Electronically signed by: Cooper Coronel MD   Date:    03/06/2020   Time:    10:21      CT Chest Abdomen Pelvis With Contrast   Final Result      Bilateral diffuse nodular predominantly pleural based opacities without cavitation.  Findings are most consistent with septic emboli.  Multifocal pneumonia could not be excluded.  There is bibasilar airspace disease which may represent atelectasis or infiltrates with small bilateral pleural effusions      Mild hepatosplenomegaly.  The hyperechoic mass in the right lobe of the liver is not seen on CT and most likely represents hemangioma      No acute abnormality within the abdomen or pelvis         Electronically signed by: Sun Todd MD   Date:    03/03/2020   Time:    12:12      CT Head Without Contrast   Final Result      1. No acute intracranial abnormality.   2. Prominent focus of CSF density measuring 9 x 25 mm in anteromedial left middle cranial fossa.  This most likely represents an incidental arachnoid cyst.  Further imaging  evaluation with outpatient MRI brain without and with IV contrast is suggested.         Electronically signed by: Gallito Carmona MD   Date:    03/02/2020   Time:    17:24      X-Ray Chest AP Portable   Final Result      Improvement of the central hilar interstitial opacities with faint ground-glass opacities in lung bases suggestive of resolving edema         Electronically signed by: Sun Todd MD   Date:    03/02/2020   Time:    08:55      US Abdomen Limited   Final Result      1.  Sludge seen in the gallbladder, without findings to specifically suggest acute cholecystitis (noting borderline gallbladder wall thickening).      2.  Rounded echogenic focus in the right lobe of the liver, with imaging features of an hepatic hemangioma, consider ultrasound follow-up in 6 months to document stability.      3.  Numerous small periportal lymph nodes, likely reactive/inflammatory in nature.  Consider correlation with liver function tests and hepatitis serologies.      .         Electronically signed by: Daryl Kyle MD   Date:    02/29/2020   Time:    20:01      X-Ray Chest AP Portable   Final Result      Mild central hilar bronchovascular crowding, possibly secondary to low lung volumes and atelectasis or very mild edema, or atypical infection/bronchitis as above         Electronically signed by: Daryl Kyle MD   Date:    02/29/2020   Time:    15:28      X-Ray Chest AP Portable    (Results Pending)       All labs, images, and other studies - including those not included in this note -- were reviewed personally by me.    Inpatient medications  Scheduled Meds:   enoxaparin  40 mg Subcutaneous Daily    magnesium sulfate IVPB  2 g Intravenous Once    methadone  30 mg Oral Daily    vancomycin (VANCOCIN) IVPB  1,500 mg Intravenous Q12H     Continuous Infusions:  PRN Meds:.acetaminophen, calcium chloride IVPB, calcium chloride IVPB, calcium chloride IVPB, ibuprofen, lorazepam, magnesium oxide, magnesium sulfate  IVPB, magnesium sulfate IVPB, magnesium sulfate IVPB, magnesium sulfate IVPB, ondansetron, oxyCODONE, potassium chloride in water, potassium chloride in water, potassium chloride in water, potassium chloride in water, potassium chloride, potassium chloride, potassium chloride, potassium chloride, DIPH,PERTUS (ADACEL),TETANUS PF VAC (ADULT), Pharmacy to dose Vancomycin consult **AND** vancomycin - pharmacy to dose    Above encounter included review of the medical records, interviewing and examining the patient face-to-face, discussion with family and other health care providers, ordering and interpreting lab/test results and formulating a plan of care.     Stacie Fitzgerald MD  [facility] Hospitalist

## 2020-03-12 LAB
ALBUMIN SERPL BCP-MCNC: 2.8 G/DL (ref 3.5–5.2)
ALP SERPL-CCNC: 107 U/L (ref 55–135)
ALT SERPL W/O P-5'-P-CCNC: 46 U/L (ref 10–44)
ANION GAP SERPL CALC-SCNC: 6 MMOL/L (ref 8–16)
AST SERPL-CCNC: 35 U/L (ref 10–40)
BACTERIA BLD CULT: NORMAL
BASOPHILS # BLD AUTO: 0.14 K/UL (ref 0–0.2)
BASOPHILS NFR BLD: 1.2 % (ref 0–1.9)
BILIRUB SERPL-MCNC: 1.8 MG/DL (ref 0.1–1)
BUN SERPL-MCNC: 19 MG/DL (ref 6–20)
CALCIUM SERPL-MCNC: 8.8 MG/DL (ref 8.7–10.5)
CHLORIDE SERPL-SCNC: 103 MMOL/L (ref 95–110)
CO2 SERPL-SCNC: 26 MMOL/L (ref 23–29)
CREAT SERPL-MCNC: 0.6 MG/DL (ref 0.5–1.4)
DIFFERENTIAL METHOD: ABNORMAL
EOSINOPHIL # BLD AUTO: 0.2 K/UL (ref 0–0.5)
EOSINOPHIL NFR BLD: 2 % (ref 0–8)
ERYTHROCYTE [DISTWIDTH] IN BLOOD BY AUTOMATED COUNT: 15.3 % (ref 11.5–14.5)
EST. GFR  (AFRICAN AMERICAN): >60 ML/MIN/1.73 M^2
EST. GFR  (NON AFRICAN AMERICAN): >60 ML/MIN/1.73 M^2
GLUCOSE SERPL-MCNC: 88 MG/DL (ref 70–110)
HCT VFR BLD AUTO: 26.6 % (ref 37–48.5)
HGB BLD-MCNC: 8.7 G/DL (ref 12–16)
IMM GRANULOCYTES # BLD AUTO: 0.08 K/UL (ref 0–0.04)
IMM GRANULOCYTES NFR BLD AUTO: 0.7 % (ref 0–0.5)
LYMPHOCYTES # BLD AUTO: 3.2 K/UL (ref 1–4.8)
LYMPHOCYTES NFR BLD: 28.4 % (ref 18–48)
MAGNESIUM SERPL-MCNC: 1.9 MG/DL (ref 1.6–2.6)
MCH RBC QN AUTO: 31.8 PG (ref 27–31)
MCHC RBC AUTO-ENTMCNC: 32.7 G/DL (ref 32–36)
MCV RBC AUTO: 97 FL (ref 82–98)
MONOCYTES # BLD AUTO: 0.8 K/UL (ref 0.3–1)
MONOCYTES NFR BLD: 7.5 % (ref 4–15)
NEUTROPHILS # BLD AUTO: 6.8 K/UL (ref 1.8–7.7)
NEUTROPHILS NFR BLD: 60.2 % (ref 38–73)
NRBC BLD-RTO: 0 /100 WBC
PLATELET # BLD AUTO: 514 K/UL (ref 150–350)
PMV BLD AUTO: 9.1 FL (ref 9.2–12.9)
POTASSIUM SERPL-SCNC: 4.2 MMOL/L (ref 3.5–5.1)
PROT SERPL-MCNC: 8.4 G/DL (ref 6–8.4)
RBC # BLD AUTO: 2.74 M/UL (ref 4–5.4)
SODIUM SERPL-SCNC: 135 MMOL/L (ref 136–145)
WBC # BLD AUTO: 11.24 K/UL (ref 3.9–12.7)

## 2020-03-12 PROCEDURE — 85025 COMPLETE CBC W/AUTO DIFF WBC: CPT

## 2020-03-12 PROCEDURE — 87641 MR-STAPH DNA AMP PROBE: CPT

## 2020-03-12 PROCEDURE — 80053 COMPREHEN METABOLIC PANEL: CPT

## 2020-03-12 PROCEDURE — 63600175 PHARM REV CODE 636 W HCPCS: Performed by: FAMILY MEDICINE

## 2020-03-12 PROCEDURE — 63600175 PHARM REV CODE 636 W HCPCS: Performed by: INTERNAL MEDICINE

## 2020-03-12 PROCEDURE — 25000003 PHARM REV CODE 250: Performed by: INTERNAL MEDICINE

## 2020-03-12 PROCEDURE — 12000002 HC ACUTE/MED SURGE SEMI-PRIVATE ROOM

## 2020-03-12 PROCEDURE — 83735 ASSAY OF MAGNESIUM: CPT

## 2020-03-12 RX ORDER — MAGNESIUM SULFATE HEPTAHYDRATE 40 MG/ML
2 INJECTION, SOLUTION INTRAVENOUS ONCE
Status: COMPLETED | OUTPATIENT
Start: 2020-03-12 | End: 2020-03-12

## 2020-03-12 RX ADMIN — VANCOMYCIN HYDROCHLORIDE 1500 MG: 1 INJECTION, POWDER, LYOPHILIZED, FOR SOLUTION INTRAVENOUS at 12:03

## 2020-03-12 RX ADMIN — MAGNESIUM SULFATE 2 G: 2 INJECTION INTRAVENOUS at 08:03

## 2020-03-12 RX ADMIN — METHADONE HYDROCHLORIDE 30 MG: 10 TABLET ORAL at 08:03

## 2020-03-12 NOTE — PLAN OF CARE
PT STABLE; VSS WDL. PT HAS NO PAIN OR COMPLAINS.  SAFETY PRECAUTIONS IMPLEMENTED.  TEACHING PROVIDED. PT VERBALIZES UNDERSTANDING.     Problem: Adult Inpatient Plan of Care  Goal: Plan of Care Review  Outcome: Ongoing, Progressing  Goal: Patient-Specific Goal (Individualization)  Outcome: Ongoing, Progressing  Goal: Absence of Hospital-Acquired Illness or Injury  Outcome: Ongoing, Progressing  Goal: Optimal Comfort and Wellbeing  Outcome: Ongoing, Progressing  Goal: Readiness for Transition of Care  Outcome: Ongoing, Progressing  Goal: Rounds/Family Conference  Outcome: Ongoing, Progressing     Problem: Fall Injury Risk  Goal: Absence of Fall and Fall-Related Injury  Outcome: Ongoing, Progressing     Problem: Skin Injury Risk Increased  Goal: Skin Health and Integrity  Outcome: Ongoing, Progressing     Problem: Infection  Goal: Infection Symptom Resolution  Outcome: Ongoing, Progressing     Problem: Wound  Goal: Optimal Wound Healing  Outcome: Ongoing, Progressing     Problem: Restraint, Nonbehavioral (Nonviolent)  Goal: Discontinuation Criteria Achieved  Outcome: Ongoing, Progressing  Goal: Personal Dignity and Safety Maintained  Outcome: Ongoing, Progressing     Problem: Oral Intake Inadequate  Goal: Improved Oral Intake  Outcome: Ongoing, Progressing     Problem: Malnutrition  Goal: Improved Nutritional Intake  Outcome: Ongoing, Progressing

## 2020-03-12 NOTE — PROGRESS NOTES
[facility] Medicine  Progress Note    Patient Name: Soraya Gee  MRN: [id number]  Patient Class: IP- Inpatient   Admission Date: 2/29/2020  2:42 PM  Attending Physician: Stacie Fitzgerald MD  Primary Care Provider: Jus Griggs Iii, MD  Face-to-Face encounter date: 03/12/2020     Active Hospital Problems    Diagnosis  POA    *Acute endocarditis [I33.9]  Yes    Endocarditis [I38]  Yes    IVDU (intravenous drug user) [F19.90]  Yes    Hepatitis C [B19.20]  Yes    TELLY (acute kidney injury) [N17.9]  Yes      Resolved Hospital Problems   No resolved problems to display.      Assessment & Plan:   Soraya Gee is a 34 y.o. female with:    MRSA Endocarditis with persistent Bacteremia  Septic Pulmonary Emboli  - per ID recs, pt now back on vancomycin  - end date abx: 4/4/2020  - LTAC placement denied; lnas-db-bqvg done, still denied  - SNF placement in progress    IVDU / heroin -abuse  - methadone  - will need outpt follow-up    Moderate Protein Malnutrition 2/2 IV Drug Abuse  - nutrition following    HCV  - pt will need outpt follow-up    Other chronic conditions:  Home meds as appropriate  Electrolyte derangement:  Trending BMP, Mg; Replacement prn  DVT ppx:  lovenox  FULL CODE    Discharge Planning:     SNF in progress    Subjective:      HPI:    34-year-old patient getting admitted with suspected sepsis from acute infective endocarditis  Patient is a IV drug abuse and uses heroin on a daily basis  For the past 2 days she has been having fever/myalgia/malaise and generalized weakness all over the body  Later she started having shortness of breath and patient did notice that her legs are getting more swollen  Because of the persistence of symptoms she came to the emergency room and got admitted  Patient initially thought she might have got cotton fever  Per patient if she will not take heroin every day she will develop severe withdrawal symptoms  No other issues.  She lives alone but has  "got a boyfriend who lives very nearby    Hospital Course:    Patient was admitted with most likely bacterial endocarditis  She has daily urine IV drug user.  Methadone started yesterday and increased the dose further  Still having persistent fevers, blood culture with persistent  MRSA  Seen and examined the patient she is awake alert oriented and able to answer all the questions, off Precedex  Patient is going to need NAVA     3/3: Ms Gee is sedated on precedex and methadone. Pt arouses but has limited response to questions but she did move all extremities,     3/4: Pt is awake and alert with c/o 8/10 flank pain. Pt has 4/10 pain with oxycontin for break thru pain. She has no neurologic complaints. I have discussed the case with Dr Rodriguez and appreciate her imput. Pt nurse and I discussed the need for rehab Re opiate dependency.     3/5: Pt states that the methadone really helps her pain and opiate cravings. She has taken only 1 oxycodone today. Pt with seek treatment at a methadone clinic post discharge. Her side pain has improved from an 8/10 to < 4/10. Pt has improved enough to be sent to telemetry     3/6: Ms Elizondo has no complaints. Case discussed with Dr Rodriguez may go to an LTiiAC for further therapy. I will consult  for the above     3/7: I am feeling better, my side pain is low( 4/10 at worst) . Pt agrees to LTAC placement     3/8: The patient is achy but comfortable with pain < 4/10    3/10:  No pain today.  No SOB.  R ankle edema noted.    3/11:  Pt resting comfortably.  No pain.  No SOB.  No dizziness.    3/12:  LTAC denied.  I will not send home an IVDU with a PICC.  SNF now in progress.  Pt denies pain or SOB.    Review of Systems   All systems reviewed and are negative except as noted per above.  Objective:     Physical Exam  BP (!) 140/88   Pulse 100   Temp 98.6 °F (37 °C) (Oral)   Resp 20   Ht 5' 8" (1.727 m)   Wt 80.7 kg (177 lb 14.6 oz)   LMP  (LMP Unknown)   SpO2 99%   " Breastfeeding? No   BMI 27.05 kg/m²     Gen: alert, responsive   HEENT:  Eyes - no pallor  External ears with no lesions  Nares patent  Mouth - lips chapped  CV: RRR, +murmur  Lungs: CTA B/L  Abd: +BS, soft, NT, ND  Ext: R ankle edema  Skin: warm, dry  Neuro: grossly intact  Psych: pleasant    Recent Labs   Lab 03/12/20  0349   WBC 11.24   HGB 8.7*   HCT 26.6*   *     Recent Labs   Lab 03/12/20  0349   CALCIUM 8.8   ALBUMIN 2.8*   PROT 8.4   *   K 4.2   CO2 26      BUN 19   CREATININE 0.6   ALKPHOS 107   ALT 46*   AST 35   BILITOT 1.8*     No results found for: POCTGLUCOSE   Microbiology Results (last 7 days)     Procedure Component Value Units Date/Time    Blood culture [[id number]] Collected:  03/10/20 1155    Order Status:  Completed Specimen:  Blood from Line, PICC Right Basilic Updated:  03/12/20 1432     Blood Culture, Routine No Growth to date      No Growth to date      No Growth to date    Narrative:       Draw from picc    Blood culture [[id number]] Collected:  03/07/20 0656    Order Status:  Completed Specimen:  Blood Updated:  03/12/20 1032     Blood Culture, Routine No growth after 5 days.    Blood culture [[id number]] Collected:  03/10/20 0420    Order Status:  Completed Specimen:  Blood from Line, PICC Right Brachial Updated:  03/12/20 0632     Blood Culture, Routine No Growth to date      No Growth to date      No Growth to date    Narrative:       Unit draw per picc line    MRSA Screen by PCR [[id number]] Collected:  03/12/20 0330    Order Status:  Sent Specimen:  Nasopharyngeal Swab from Nasal Updated:  03/12/20 0410    Blood culture [[id number]] Collected:  03/09/20 1622    Order Status:  Completed Specimen:  Blood from Line, PICC Right Basilic Updated:  03/11/20 1832     Blood Culture, Routine No Growth to date      No Growth to date      No Growth to date    Narrative:       Draw from picc    Blood culture [[id number]] Collected:  03/05/20 0317    Order Status:  Completed Specimen:   Blood Updated:  03/10/20 0632     Blood Culture, Routine No growth after 5 days.    Blood culture [[id number]]  (Abnormal)  (Susceptibility) Collected:  03/06/20 0350    Order Status:  Completed Specimen:  Blood Updated:  03/09/20 0644     Blood Culture, Routine Gram stain aer bottle: Gram positive cocci      Results called to and read back by:Darius Olvera [id number];        03/07/2020  05:25 CJD      METHICILLIN RESISTANT STAPHYLOCOCCUS AUREUS  Known MRSA patient      Blood culture [[id number]] Collected:  03/04/20 0358    Order Status:  Completed Specimen:  Blood Updated:  03/09/20 0632     Blood Culture, Routine No growth after 5 days.    Blood culture [[id number]]  (Abnormal)  (Susceptibility) Collected:  03/02/20 0441    Order Status:  Completed Specimen:  Blood from Antecubital, Right Updated:  03/06/20 0744     Blood Culture, Routine Gram stain aer bottle: Gram positive cocci in clusters resembling Staph      Positive results previously called on [id number]      METHICILLIN RESISTANT STAPHYLOCOCCUS AUREUS  Results called to and read back by: Dahlia Briscoe RN MICU  03/05/2020  14:01 by DRP          X-Ray Chest AP Portable   Final Result      Interval insertion of a right PICC as above         Electronically signed by: Cooper Coronel MD   Date:    03/06/2020   Time:    10:21      CT Chest Abdomen Pelvis With Contrast   Final Result      Bilateral diffuse nodular predominantly pleural based opacities without cavitation.  Findings are most consistent with septic emboli.  Multifocal pneumonia could not be excluded.  There is bibasilar airspace disease which may represent atelectasis or infiltrates with small bilateral pleural effusions      Mild hepatosplenomegaly.  The hyperechoic mass in the right lobe of the liver is not seen on CT and most likely represents hemangioma      No acute abnormality within the abdomen or pelvis         Electronically signed by: Sun Todd MD   Date:    03/03/2020   Time:    12:12       CT Head Without Contrast   Final Result      1. No acute intracranial abnormality.   2. Prominent focus of CSF density measuring 9 x 25 mm in anteromedial left middle cranial fossa.  This most likely represents an incidental arachnoid cyst.  Further imaging evaluation with outpatient MRI brain without and with IV contrast is suggested.         Electronically signed by: Gallito Carmona MD   Date:    03/02/2020   Time:    17:24      X-Ray Chest AP Portable   Final Result      Improvement of the central hilar interstitial opacities with faint ground-glass opacities in lung bases suggestive of resolving edema         Electronically signed by: Sun Todd MD   Date:    03/02/2020   Time:    08:55      US Abdomen Limited   Final Result      1.  Sludge seen in the gallbladder, without findings to specifically suggest acute cholecystitis (noting borderline gallbladder wall thickening).      2.  Rounded echogenic focus in the right lobe of the liver, with imaging features of an hepatic hemangioma, consider ultrasound follow-up in 6 months to document stability.      3.  Numerous small periportal lymph nodes, likely reactive/inflammatory in nature.  Consider correlation with liver function tests and hepatitis serologies.      .         Electronically signed by: Daryl Kyle MD   Date:    02/29/2020   Time:    20:01      X-Ray Chest AP Portable   Final Result      Mild central hilar bronchovascular crowding, possibly secondary to low lung volumes and atelectasis or very mild edema, or atypical infection/bronchitis as above         Electronically signed by: Daryl Kyle MD   Date:    02/29/2020   Time:    15:28      X-Ray Chest AP Portable    (Results Pending)       All labs, images, and other studies - including those not included in this note -- were reviewed personally by me.    Inpatient medications  Scheduled Meds:   enoxaparin  40 mg Subcutaneous Daily    methadone  30 mg Oral Daily    [START ON 3/13/2020]  vancomycin (VANCOCIN) IVPB  1,500 mg Intravenous Q10H     Continuous Infusions:  PRN Meds:.acetaminophen, calcium chloride IVPB, calcium chloride IVPB, calcium chloride IVPB, ibuprofen, lorazepam, magnesium oxide, magnesium sulfate IVPB, magnesium sulfate IVPB, magnesium sulfate IVPB, magnesium sulfate IVPB, ondansetron, oxyCODONE, potassium chloride in water, potassium chloride in water, potassium chloride in water, potassium chloride in water, potassium chloride, potassium chloride, potassium chloride, potassium chloride, DIPH,PERTUS (ADACEL),TETANUS PF VAC (ADULT), Pharmacy to dose Vancomycin consult **AND** vancomycin - pharmacy to dose    Above encounter included review of the medical records, interviewing and examining the patient face-to-face, discussion with family and other health care providers, ordering and interpreting lab/test results and formulating a plan of care.     Stacie Fitzgerald MD  [facility] Hospitalist

## 2020-03-12 NOTE — PLAN OF CARE
03/12/20 1116   Post-Acute Status   Post-Acute Authorization Placement   Post-Acute Placement Status Pending Payor Medical Review   Patient was denied by insurance. Called Dr. Fitzgerald to do peer to peer. Was able to call insurance and talked to Deloris to set up peer to peer for 2:00pm today with dr. Daniel Linn for LTAC. Dr. Fitzgerald is aware.

## 2020-03-13 LAB
MRSA SCREEN BY PCR: NEGATIVE
VANCOMYCIN TROUGH SERPL-MCNC: 17.9 UG/ML (ref 10–22)

## 2020-03-13 PROCEDURE — 12000002 HC ACUTE/MED SURGE SEMI-PRIVATE ROOM

## 2020-03-13 PROCEDURE — 80202 ASSAY OF VANCOMYCIN: CPT

## 2020-03-13 PROCEDURE — 63600175 PHARM REV CODE 636 W HCPCS: Performed by: FAMILY MEDICINE

## 2020-03-13 PROCEDURE — 25000003 PHARM REV CODE 250: Performed by: INTERNAL MEDICINE

## 2020-03-13 RX ADMIN — METHADONE HYDROCHLORIDE 30 MG: 10 TABLET ORAL at 08:03

## 2020-03-13 RX ADMIN — VANCOMYCIN HYDROCHLORIDE 1500 MG: 1 INJECTION, POWDER, LYOPHILIZED, FOR SOLUTION INTRAVENOUS at 01:03

## 2020-03-13 RX ADMIN — VANCOMYCIN HYDROCHLORIDE 1500 MG: 1 INJECTION, POWDER, LYOPHILIZED, FOR SOLUTION INTRAVENOUS at 02:03

## 2020-03-13 RX ADMIN — VANCOMYCIN HYDROCHLORIDE 1500 MG: 1 INJECTION, POWDER, LYOPHILIZED, FOR SOLUTION INTRAVENOUS at 11:03

## 2020-03-13 RX ADMIN — ACETAMINOPHEN 650 MG: 325 TABLET ORAL at 10:03

## 2020-03-13 NOTE — PLAN OF CARE
03/13/20 1734   Post-Acute Status   Post-Acute Authorization Placement   Post-Acute Placement Status Referrals Sent   Peer to Peer results showed that more SNF appropriate. Spoke with patient about Freedom of Choice Form, explained to patient and family that they have the right to choose any agency, and a list of agencies was provided to patient and family to review, they verbalized an understanding, pt signed form, and form scanned into CM notes. Sent referrals to [facility], [facility], and [facility]. Will follow up.

## 2020-03-13 NOTE — PROGRESS NOTES
"Progress Note  Infectious Disease    Reason for Consult:  Probable endocarditis    02/29/2020.  Consult note. Soraya Gee is a  appearing 34 y.o. female with past medical history of hepatitis-C, IV drug use, in present for 3 years, released in March 2019, that is when she resumed IV drugs.  She comes in complaining of shaking chills, fever, myalgia, malaise, generalized weakness, 3 days duration after injecting some "bad drugs".  Initially she thought she had cotton fever but the symptoms persisted so she came to ER.  Patient also complains of shortness of breath, she was tachycardic and tachypneic on admission.  She has noticed swelling throughout.  Her albumin is low  Patient is a IV drug abuse and uses heroin twice a day, for a total daily dose of 1 g.  No sick contacts.  She is admitted in ICU for closer observation to make sure she does not withdrawal.  Patient is tachycardic, tachypneic, hurting all over, her history is limited by although this    03/01/2020.  Withdrawal symptoms are improved with Precedex drip; although she still feels like she is breathing fast and she is hurting all over.  She spiked fever of 101.3.  Blood cultures came positive for GPC.  No urinary complaints, no new skin or joint complaint, no cough or phlegm.  03/02/2020.  She spiked another fever of 102.3.  All cultures are turning up to be MRSA  CRP improved from 20/5 down to 13.  WBC is still elevated.  She gets confused on and off but then she interacts appropriately.    3/3:  Chart reviewed and discussed with Dr. Mccormack.  Persistently positive blood cultures with MRSA, vancomycin EDDIE 1.  Persistently hectic fever and septic appearance, improve hyperbilirubinemia, right upper quadrant tenderness, generalized aches and pains, sedated a bit on Precedex and methadone, neurologically intact.  3/4: still febrile through late last night. 3/2 cultures negative, but 3/3 cultures have GPC. CT chest/abd/pelvis reviewed and septic pulmonary " "emboli are present, but no spleen, muscle or paraspinous lesions noted.  She is currently up in the chair, off Precedex.  We discussed her diagnosis and she is willing to receive the treatment.  Does not have much appetite but she will drink the in [place].  She still hurts everywhere and does have some pleurisy.  3/5: temperature improving. Blood cultures from yesterday are negative so far. She looks much improved, clearer mentally, still very uncomfortable, somewhat anxious. Not remembering information given yesterday. Expresses desire to "get off " the drugs. Still has no appetite. Drinking a little ensure. No diarrhea. Still splinting from pleurisy. Discussed with her about the NAVA. Length of therapy.   3/6: afebrile. Blood cultures neg x 2 days. Very anxious/hyperventilating, about NAVA. D/w anesthesia. Reassured her. No nausea, vomiting. Ok for picc line.  3/7: afebrile. VSS. 1 blood culture from yesterday drawn peripherally "left wrist" has GPC. I cannot confirm that it wasn't drawn from midline but site is different. NAVA demonstrated TV vegetation with flail leaflet and TR. No change in vision, no new MSK pain, or skin lesion. Still has some pleural discomfort associated with septic emboli.   3/8: 3/6 blood culture identified as Staph aureus. She went outside but now knows she is restricted. She has showered and put on make up. She is eating well. No diarrhea. Mild LE edema which she states is not new. No antibiotic intolerance. No change in vision, new MSK pain, new skin lesion  3/9: blood cultures not drawn until this afternoon. She feels well. Eating well. Would like to walk more. LTAC eval in progress.   3/10:  In great spirits, recent blood cultures negative.  Was visited by representative from the LTAC of her choice.  Changed over to vancomycin again.  No pleuritic complaints, appetite is good, no diarrhea, no problems with PICC line today.  No change in vision, no new musculoskeletal pain or skin " "lesions.    03/13/2020 Tmax 99.4.  afebrile now.  No chills.  She has the same chest discomfort, 2/10, worse when breathing in deep, better with rest, no associated cough or phlegm.  She admits that she feels way better than a week ago.  Good appetite.  No nausea no vomiting no diarrhea.      Antibiotics (From admission, onward)    Start     Stop Route Frequency Ordered    03/13/20 0200  vancomycin (VANCOCIN) 1,500 mg in dextrose 5 % 500 mL IVPB     Note to Pharmacy:  Ht: 5' 8" (1.727 m)  Wt: 80.7 kg (177 lb 14.6 oz)  Estimated Creatinine Clearance: 178.4 mL/min (based on SCr of 0.5 mg/dL).      -- IV Every 10 hours 03/13/20 0131    03/10/20 1202  vancomycin - pharmacy to dose  (vancomycin IVPB)      -- IV pharmacy to manage frequency 03/10/20 1102        Antifungals (From admission, onward)    None        Antivirals (From admission, onward)    None            EXAM & DIAGNOSTICS REVIEWED:   Vitals:     Temp:  [98.1 °F (36.7 °C)-99.4 °F (37.4 °C)]   Temp: 98.1 °F (36.7 °C) (03/13/20 0710)  Pulse: 94 (03/13/20 0710)  Resp: 16 (03/13/20 0710)  BP: 109/70 (03/13/20 0710)  SpO2: 97 % (03/13/20 0710)    Intake/Output Summary (Last 24 hours) at 3/13/2020 1214  Last data filed at 3/12/2020 1600  Gross per 24 hour   Intake 360 ml   Output --   Net 360 ml     Vitals:    03/13/20 0710   BP: 109/70   Pulse: 94   Resp: 16   Temp: 98.1 °F (36.7 °C)         General:  Alert cooperative , looks and feels great   Eyes:  anicteric, PERRL, EOMI, no scleral conjunctival lesions  ENT:  No ulcers, exudates, thrush, nares patent, edentulous/dentures.     Neck:  Supple,   Lungs: clear  Heart:  Regular,  soft 1/6 systolic murmur  Abd:  Soft, ND, normal BS, nontender  :  Voiding without difficulty  Musc:  No focal joint swelling, synovitis, myositis, moves all extremities  Skin:  Marks from prior IV use.    without septic emboli, no subungual petechia   Wound:   Neuro:  speech fluent, face symmetric, moves all extremities, no focal " weakness.   Psych:  Alert, pleasant, motivated  cooperative   Lymphatic:        Extrem:  No palpable edema  , phlebitis, cellulitis, warm and well perfused  VAD:   midline left arm 3/5 picc right on  Isolation:  Contact    Lines/Tubes/Drains:  Peripheral IV    General Labs reviewed:  Recent Labs   Lab 03/10/20  0436 03/11/20 0442 03/12/20 0349   WBC 12.84* 10.08 11.24   HGB 8.0* 8.0* 8.7*   HCT 24.9* 24.6* 26.6*   * 498* 514*       Recent Labs   Lab 03/10/20  0436 03/11/20 0442 03/12/20 0349    137 135*   K 4.2 4.1 4.2    105 103   CO2 26 27 26   BUN 15 16 19   CREATININE 0.5 0.7 0.6   CALCIUM 8.8 8.9 8.8   PROT 8.1 8.0 8.4   BILITOT 1.8* 1.6* 1.8*   ALKPHOS 118 105 107   ALT 33 40 46*   AST 29 32 35       Recent Labs   Lab 03/10/20  0436   CRP 2.52*  2.52*             Micro:  Microbiology Results (last 7 days)     Procedure Component Value Units Date/Time    Blood culture [[id number]] Collected:  03/10/20 0420    Order Status:  Completed Specimen:  Blood from Line, PICC Right Brachial Updated:  03/13/20 0632     Blood Culture, Routine No Growth to date      No Growth to date      No Growth to date      No Growth to date    Narrative:       Unit draw per picc line    MRSA Screen by PCR [[id number]] Collected:  03/12/20 0330    Order Status:  Completed Specimen:  Nasopharyngeal Swab from Nasal Updated:  03/13/20 0148     MRSA SCREEN BY PCR Negative    Blood culture [[id number]] Collected:  03/09/20 1622    Order Status:  Completed Specimen:  Blood from Line, PICC Right Basilic Updated:  03/12/20 1832     Blood Culture, Routine No Growth to date      No Growth to date      No Growth to date      No Growth to date    Narrative:       Draw from picc    Blood culture [[id number]] Collected:  03/10/20 1155    Order Status:  Completed Specimen:  Blood from Line, PICC Right Basilic Updated:  03/12/20 1432     Blood Culture, Routine No Growth to date      No Growth to date      No Growth to date     Narrative:       Draw from picc    Blood culture [[id number]] Collected:  03/07/20 0656    Order Status:  Completed Specimen:  Blood Updated:  03/12/20 1032     Blood Culture, Routine No growth after 5 days.    Blood culture [[id number]] Collected:  03/05/20 0317    Order Status:  Completed Specimen:  Blood Updated:  03/10/20 0632     Blood Culture, Routine No growth after 5 days.    Blood culture [[id number]]  (Abnormal)  (Susceptibility) Collected:  03/06/20 0350    Order Status:  Completed Specimen:  Blood Updated:  03/09/20 0644     Blood Culture, Routine Gram stain aer bottle: Gram positive cocci      Results called to and read back by:Darius Olvera RN-[address] [place] [address];        03/07/2020  05:25 CJD      METHICILLIN RESISTANT STAPHYLOCOCCUS AUREUS  Known MRSA patient      Blood culture [[id number]] Collected:  03/04/20 0358    Order Status:  Completed Specimen:  Blood Updated:  03/09/20 0632     Blood Culture, Routine No growth after 5 days.        Imaging Reviewed:  Chest x-rays    CXR Mild central hilar bronchovascular crowding, possibly secondary to low lung volumes and atelectasis or very mild edema, or atypical infection/bronchitis as above    US1.  Sludge seen in the gallbladder, without findings to specifically suggest acute cholecystitis (noting borderline gallbladder wall thickening).  2.  Rounded echogenic focus in the right lobe of the liver, with imaging features of an hepatic hemangioma, consider ultrasound follow-up in 6 months to document stability.  3.  Numerous small periportal lymph nodes, likely reactive/inflammatory in nature.  Consider correlation with liver function tests and hepatitis serologies.    CT chest/abd/pelvis 3/3  Bilateral diffuse nodular predominantly pleural based opacities without cavitation.  Findings are most consistent with septic emboli.  Multifocal pneumonia could not be excluded.  There is bibasilar airspace disease which may represent atelectasis or infiltrates with small  bilateral pleural effusions  Mild hepatosplenomegaly.  The hyperechoic mass in the right lobe of the liver is not seen on CT and most likely represents hemangioma  No acute abnormality within the abdomen or pelvis    Cardiology:    Transthoracic echo 3/1  Left Ventricle Normal ejection fraction at 60%. Normal left ventricular cavity size. Normal wall thickness observed. Normal left ventricular diastolic function. Normal left atrial pressure. No wall motion abnormalities.   Right Ventricle Normal cavity size, wall thickness and systolic function. Wall motion normal.   Left Atrium The left atrium is normal.   Right Atrium There is normal right atrial size.   Aortic Valve The aortic valve appears structurally normal. There is no stenosis. No regurgitation. There is normal leaflet mobility.The LVOT diameter is 2.02 cm.   Mitral Valve The mitral valve appears structurally normal. There is normal leaflet mobility. No regurgitation.   Tricuspid Valve The tricuspid valve appears structurally normal. No stenosis. Trace regurgitation. There is normal leaflet mobility. The estimated PA systolic pressure is 26 mmHg.   Pulmonic Valve The pulmonic valve was not well visualized. No stenosis. No regurgitation.   IVC/SVC Normal central venous pressure (3 mm Hg).   Ascending Aorta The aortic root and ascending aorta are normal in size.   Pericardium No pericardial effusion.     NAVA 3/6:  1. Aortic valve is structurally normal with good leaflet excursion. No significant regurgitation.   2. Mitral valve is structurally normal with good leaflet excursion. Trace regurgitation.   3. Tricuspid valve: There appears to be a small vegetation (0.9 X 0.4 cm) attached to the base of the septal leaflet. There also appears to be prolapse and flail of the leaflets causing severe eccentric tricuspid regurgitation.   4. Pulmonary valve is structurally normal with good leaflet excursion. No significant regurgitation.  5. Overall LVEF and RVEF appears  normal.   6. No obvious shunt across the interatrial septum by color flow Doppler and agitated saline.     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 Septic pulmonary emboli  Flail valve leaflet with tricuspid regurg  Elevated markers of inflammation including procal, CRP, ESR. improving  TELLY. resolved   Epigastric right upper quadrant discomfort and generalized abdominal discomfort.-resolved  Jaundice Transaminitis, heavy opiate use, H/o Hep C, untreated, ammonia normal:  Bilirubin improved  IVDU. heroin  Anemia;   Protein malnutrition.  Hypoalbuminemia =  2.6; anxiety  This patient is high risk for life-threatening deterioration and death secondary to above comorbidities and need for IV treatment.      Recommendations:  Patient has been treated with appropriate antibiotics since 02/29/2020.  Initially with vancomycin and Dapto;   then with daptomycin and Teflaro.  Converted  back to vancomycin  On 03/10   End date of IV antibiotics: 4/4, =/-followed by po doxy for lungs x 2 weeks??    LTAC placment in progress

## 2020-03-13 NOTE — PLAN OF CARE
MRSA is Negative. Pt asking when she will be off contact precautions.. Day shift Hospitalist to address the removal of contact precautions

## 2020-03-13 NOTE — PROGRESS NOTES
[facility]  Progress Note    Patient Name: Soraya Gee  MRN: [id number]  Patient Class: IP- Inpatient   Admission Date: 2/29/2020  2:42 PM  Attending Physician: Stacie Fitzgerald MD  Primary Care Provider: Jus Griggs Iii, MD  Face-to-Face encounter date: 03/13/2020     Active Hospital Problems    Diagnosis  POA    *Acute endocarditis [I33.9]  Yes    Endocarditis [I38]  Yes    IVDU (intravenous drug user) [F19.90]  Yes    Hepatitis C [B19.20]  Yes    TELLY (acute kidney injury) [N17.9]  Yes      Resolved Hospital Problems   No resolved problems to display.      Assessment & Plan:   Soraya Gee is a 34 y.o. female with:    MRSA Endocarditis with persistent Bacteremia  Septic Pulmonary Emboli  - per ID recs, vancomycin  - end date abx: 4/4/2020  - LTAC placement denied; [id number]-[id number] done, still denied  - SNF placement in progress    IVDU / heroin -abuse  - methadone  - will need outpt follow-up    Moderate Protein Malnutrition 2/2 IV Drug Abuse  - nutrition following    HCV  - pt will need outpt follow-up    Other chronic conditions:  Home meds as appropriate  Electrolyte derangement:  Trending BMP, Mg; Replacement prn  DVT ppx:  lovenox  FULL CODE    Discharge Planning:     SNF in progress    Subjective:      HPI:    34-year-old patient getting admitted with suspected sepsis from acute infective endocarditis  Patient is a IV drug abuse and uses heroin on a daily basis  For the past 2 days she has been having fever/myalgia/malaise and generalized weakness all over the body  Later she started having shortness of breath and patient did notice that her legs are getting more swollen  Because of the persistence of symptoms she came to the emergency room and got admitted  Patient initially thought she might have got cotton fever  Per patient if she will not take heroin every day she will develop severe withdrawal symptoms  No other issues.  She lives alone but has got a boyfriend  who lives very nearby    Hospital Course:    Patient was admitted with most likely bacterial endocarditis  She has daily urine IV drug user.  Methadone started yesterday and increased the dose further  Still having persistent fevers, blood culture with persistent  MRSA  Seen and examined the patient she is awake alert oriented and able to answer all the questions, off Precedex  Patient is going to need NAVA     3/3: Ms Gee is sedated on precedex and methadone. Pt arouses but has limited response to questions but she did move all extremities,     3/4: Pt is awake and alert with c/o 8/10 flank pain. Pt has 4/10 pain with oxycontin for break thru pain. She has no neurologic complaints. I have discussed the case with Dr Rodriguez and appreciate her imput. Pt nurse and I discussed the need for rehab Re opiate dependency.     3/5: Pt states that the methadone really helps her pain and opiate cravings. She has taken only 1 oxycodone today. Pt with seek treatment at a methadone clinic post discharge. Her side pain has improved from an 8/10 to < 4/10. Pt has improved enough to be sent to telemetry     3/6: Ms Elizondo has no complaints. Case discussed with Dr Rodriguez may go to an LTiiAC for further therapy. I will consult  for the above     3/7: I am feeling better, my side pain is low( 4/10 at worst) . Pt agrees to LTAC placement     3/8: The patient is achy but comfortable with pain < 4/10    3/10:  No pain today.  No SOB.  R ankle edema noted.    3/11:  Pt resting comfortably.  No pain.  No SOB.  No dizziness.    3/12:  LTAC denied.  I will not send home an IVDU with a PICC.  SNF now in progress.  Pt denies pain or SOB.    3/13:  SNF in progress.  No pain, no SOB.  In good spirits.    Review of Systems   All systems reviewed and are negative except as noted per above.  Objective:     Physical Exam  /70 (BP Location: Left arm, Patient Position: Lying)   Pulse 94   Temp 98.1 °F (36.7 °C) (Oral)   Resp  "16   Ht 5' 8" (1.727 m)   Wt 80.7 kg (177 lb 14.6 oz)   LMP  (LMP Unknown)   SpO2 97%   Breastfeeding? No   BMI 27.05 kg/m²     Gen: alert, responsive   HEENT:  Eyes - no pallor  External ears with no lesions  Nares patent  Mouth - lips chapped  CV: RRR, +murmur  Lungs: CTA B/L  Abd: +BS, soft, NT, ND  Ext: R ankle edema  Skin: warm, dry  Neuro: grossly intact  Psych: pleasant    No results for input(s): WBC, HGB, HCT, PLT in the last 24 hours.  No results for input(s): GLUCOSE, CALCIUM, ALBUMIN, PROT, NA, K, CO2, CL, BUN, CREATININE, ALKPHOS, ALT, AST, BILITOT in the last 24 hours.  No results found for: POCTGLUCOSE   Microbiology Results (last 7 days)     Procedure Component Value Units Date/Time    Blood culture [[id number]] Collected:  03/10/20 0420    Order Status:  Completed Specimen:  Blood from Line, PICC Right Brachial Updated:  03/13/20 0632     Blood Culture, Routine No Growth to date      No Growth to date      No Growth to date      No Growth to date    Narrative:       Unit draw per picc line    MRSA Screen by PCR [[id number]] Collected:  03/12/20 0330    Order Status:  Completed Specimen:  Nasopharyngeal Swab from Nasal Updated:  03/13/20 0148     MRSA SCREEN BY PCR Negative    Blood culture [[id number]] Collected:  03/09/20 1622    Order Status:  Completed Specimen:  Blood from Line, PICC Right Basilic Updated:  03/12/20 1832     Blood Culture, Routine No Growth to date      No Growth to date      No Growth to date      No Growth to date    Narrative:       Draw from picc    Blood culture [[id number]] Collected:  03/10/20 1155    Order Status:  Completed Specimen:  Blood from Line, PICC Right Basilic Updated:  03/12/20 1432     Blood Culture, Routine No Growth to date      No Growth to date      No Growth to date    Narrative:       Draw from picc    Blood culture [[id number]] Collected:  03/07/20 0656    Order Status:  Completed Specimen:  Blood Updated:  03/12/20 1032     Blood Culture, Routine No " growth after 5 days.    Blood culture [[id number]] Collected:  03/05/20 0317    Order Status:  Completed Specimen:  Blood Updated:  03/10/20 0632     Blood Culture, Routine No growth after 5 days.    Blood culture [[id number]]  (Abnormal)  (Susceptibility) Collected:  03/06/20 0350    Order Status:  Completed Specimen:  Blood Updated:  03/09/20 0644     Blood Culture, Routine Gram stain aer bottle: Gram positive cocci      Results called to and read back by:Darius Olvera RN-[address] [place] [address];        03/07/2020  05:25 CJD      METHICILLIN RESISTANT STAPHYLOCOCCUS AUREUS  Known MRSA patient      Blood culture [[id number]] Collected:  03/04/20 0358    Order Status:  Completed Specimen:  Blood Updated:  03/09/20 0632     Blood Culture, Routine No growth after 5 days.        X-Ray Chest AP Portable   Final Result      Interval insertion of a right PICC as above         Electronically signed by: Cooper Coronel MD   Date:    03/06/2020   Time:    10:21      CT Chest Abdomen Pelvis With Contrast   Final Result      Bilateral diffuse nodular predominantly pleural based opacities without cavitation.  Findings are most consistent with septic emboli.  Multifocal pneumonia could not be excluded.  There is bibasilar airspace disease which may represent atelectasis or infiltrates with small bilateral pleural effusions      Mild hepatosplenomegaly.  The hyperechoic mass in the right lobe of the liver is not seen on CT and most likely represents hemangioma      No acute abnormality within the abdomen or pelvis         Electronically signed by: Sun Todd MD   Date:    03/03/2020   Time:    12:12      CT Head Without Contrast   Final Result      1. No acute intracranial abnormality.   2. Prominent focus of CSF density measuring 9 x 25 mm in anteromedial left middle cranial fossa.  This most likely represents an incidental arachnoid cyst.  Further imaging evaluation with outpatient MRI brain without and with IV contrast is suggested.          Electronically signed by: Gallito Carmona MD   Date:    03/02/2020   Time:    17:24      X-Ray Chest AP Portable   Final Result      Improvement of the central hilar interstitial opacities with faint ground-glass opacities in lung bases suggestive of resolving edema         Electronically signed by: Sun Todd MD   Date:    03/02/2020   Time:    08:55      US Abdomen Limited   Final Result      1.  Sludge seen in the gallbladder, without findings to specifically suggest acute cholecystitis (noting borderline gallbladder wall thickening).      2.  Rounded echogenic focus in the right lobe of the liver, with imaging features of an hepatic hemangioma, consider ultrasound follow-up in 6 months to document stability.      3.  Numerous small periportal lymph nodes, likely reactive/inflammatory in nature.  Consider correlation with liver function tests and hepatitis serologies.      .         Electronically signed by: Daryl Kyle MD   Date:    02/29/2020   Time:    20:01      X-Ray Chest AP Portable   Final Result      Mild central hilar bronchovascular crowding, possibly secondary to low lung volumes and atelectasis or very mild edema, or atypical infection/bronchitis as above         Electronically signed by: Daryl Kyle MD   Date:    02/29/2020   Time:    15:28      X-Ray Chest AP Portable    (Results Pending)       All labs, images, and other studies - including those not included in this note -- were reviewed personally by me.    Inpatient medications  Scheduled Meds:   enoxaparin  40 mg Subcutaneous Daily    methadone  30 mg Oral Daily    vancomycin (VANCOCIN) IVPB  1,500 mg Intravenous Q10H     Continuous Infusions:  PRN Meds:.acetaminophen, calcium chloride IVPB, calcium chloride IVPB, calcium chloride IVPB, ibuprofen, lorazepam, magnesium oxide, magnesium sulfate IVPB, magnesium sulfate IVPB, magnesium sulfate IVPB, magnesium sulfate IVPB, ondansetron, oxyCODONE, potassium chloride in water,  potassium chloride in water, potassium chloride in water, potassium chloride in water, potassium chloride, potassium chloride, potassium chloride, potassium chloride, DIPH,PERTUS (ADACEL),TETANUS PF VAC (ADULT), Pharmacy to dose Vancomycin consult **AND** vancomycin - pharmacy to dose    Above encounter included review of the medical records, interviewing and examining the patient face-to-face, discussion with family and other health care providers, ordering and interpreting lab/test results and formulating a plan of care.     Stacie Fitzgerald MD  [facility] Hospitalist

## 2020-03-14 LAB
ALBUMIN SERPL BCP-MCNC: 2.7 G/DL (ref 3.5–5.2)
ALP SERPL-CCNC: 97 U/L (ref 55–135)
ALT SERPL W/O P-5'-P-CCNC: 50 U/L (ref 10–44)
ANION GAP SERPL CALC-SCNC: 7 MMOL/L (ref 8–16)
AST SERPL-CCNC: 36 U/L (ref 10–40)
BACTERIA BLD CULT: NORMAL
BASOPHILS # BLD AUTO: 0.07 K/UL (ref 0–0.2)
BASOPHILS NFR BLD: 0.8 % (ref 0–1.9)
BILIRUB SERPL-MCNC: 1.5 MG/DL (ref 0.1–1)
BILIRUB UR QL STRIP: NEGATIVE
BUN SERPL-MCNC: 23 MG/DL (ref 6–20)
CALCIUM SERPL-MCNC: 8.9 MG/DL (ref 8.7–10.5)
CHLORIDE SERPL-SCNC: 102 MMOL/L (ref 95–110)
CLARITY UR: ABNORMAL
CO2 SERPL-SCNC: 27 MMOL/L (ref 23–29)
COLOR UR: YELLOW
CREAT SERPL-MCNC: 0.6 MG/DL (ref 0.5–1.4)
DIFFERENTIAL METHOD: ABNORMAL
EOSINOPHIL # BLD AUTO: 0.2 K/UL (ref 0–0.5)
EOSINOPHIL NFR BLD: 2.9 % (ref 0–8)
ERYTHROCYTE [DISTWIDTH] IN BLOOD BY AUTOMATED COUNT: 15.6 % (ref 11.5–14.5)
EST. GFR  (AFRICAN AMERICAN): >60 ML/MIN/1.73 M^2
EST. GFR  (NON AFRICAN AMERICAN): >60 ML/MIN/1.73 M^2
GLUCOSE SERPL-MCNC: 92 MG/DL (ref 70–110)
GLUCOSE UR QL STRIP: NEGATIVE
HCT VFR BLD AUTO: 24.7 % (ref 37–48.5)
HGB BLD-MCNC: 8 G/DL (ref 12–16)
HGB UR QL STRIP: NEGATIVE
IMM GRANULOCYTES # BLD AUTO: 0.04 K/UL (ref 0–0.04)
IMM GRANULOCYTES NFR BLD AUTO: 0.5 % (ref 0–0.5)
KETONES UR QL STRIP: NEGATIVE
LEUKOCYTE ESTERASE UR QL STRIP: NEGATIVE
LYMPHOCYTES # BLD AUTO: 2.8 K/UL (ref 1–4.8)
LYMPHOCYTES NFR BLD: 33.6 % (ref 18–48)
MCH RBC QN AUTO: 31.7 PG (ref 27–31)
MCHC RBC AUTO-ENTMCNC: 32.4 G/DL (ref 32–36)
MCV RBC AUTO: 98 FL (ref 82–98)
MONOCYTES # BLD AUTO: 0.9 K/UL (ref 0.3–1)
MONOCYTES NFR BLD: 10.5 % (ref 4–15)
NEUTROPHILS # BLD AUTO: 4.3 K/UL (ref 1.8–7.7)
NEUTROPHILS NFR BLD: 51.7 % (ref 38–73)
NITRITE UR QL STRIP: NEGATIVE
NRBC BLD-RTO: 0 /100 WBC
PH UR STRIP: 6 [PH] (ref 5–8)
PLATELET # BLD AUTO: 383 K/UL (ref 150–350)
PMV BLD AUTO: 9.2 FL (ref 9.2–12.9)
POTASSIUM SERPL-SCNC: 4.2 MMOL/L (ref 3.5–5.1)
PROT SERPL-MCNC: 8 G/DL (ref 6–8.4)
PROT UR QL STRIP: ABNORMAL
RBC # BLD AUTO: 2.52 M/UL (ref 4–5.4)
SODIUM SERPL-SCNC: 136 MMOL/L (ref 136–145)
SP GR UR STRIP: 1.02 (ref 1–1.03)
URN SPEC COLLECT METH UR: ABNORMAL
UROBILINOGEN UR STRIP-ACNC: NEGATIVE EU/DL
WBC # BLD AUTO: 8.37 K/UL (ref 3.9–12.7)

## 2020-03-14 PROCEDURE — 25000003 PHARM REV CODE 250: Performed by: INTERNAL MEDICINE

## 2020-03-14 PROCEDURE — 63600175 PHARM REV CODE 636 W HCPCS: Performed by: FAMILY MEDICINE

## 2020-03-14 PROCEDURE — 81003 URINALYSIS AUTO W/O SCOPE: CPT

## 2020-03-14 PROCEDURE — 80053 COMPREHEN METABOLIC PANEL: CPT

## 2020-03-14 PROCEDURE — 85025 COMPLETE CBC W/AUTO DIFF WBC: CPT

## 2020-03-14 PROCEDURE — 12000002 HC ACUTE/MED SURGE SEMI-PRIVATE ROOM

## 2020-03-14 RX ADMIN — VANCOMYCIN HYDROCHLORIDE 1500 MG: 1 INJECTION, POWDER, LYOPHILIZED, FOR SOLUTION INTRAVENOUS at 11:03

## 2020-03-14 RX ADMIN — VANCOMYCIN HYDROCHLORIDE 1500 MG: 1 INJECTION, POWDER, LYOPHILIZED, FOR SOLUTION INTRAVENOUS at 05:03

## 2020-03-14 RX ADMIN — ACETAMINOPHEN 650 MG: 325 TABLET ORAL at 12:03

## 2020-03-14 RX ADMIN — METHADONE HYDROCHLORIDE 30 MG: 10 TABLET ORAL at 08:03

## 2020-03-14 NOTE — PROGRESS NOTES
[facility]  Progress Note    Patient Name: Soraya Gee  MRN: [id number]  Patient Class: IP- Inpatient   Admission Date: 2/29/2020  2:42 PM  Attending Physician: Stacie Fitzgerald MD  Primary Care Provider: Jus Griggs Iii, MD  Face-to-Face encounter date: 03/14/2020     Active Hospital Problems    Diagnosis  POA    *Acute endocarditis [I33.9]  Yes    Endocarditis [I38]  Yes    IVDU (intravenous drug user) [F19.90]  Yes    Hepatitis C [B19.20]  Yes    TELLY (acute kidney injury) [N17.9]  Yes      Resolved Hospital Problems   No resolved problems to display.      Assessment & Plan:   Soraya Gee is a 34 y.o. female with:    MRSA Endocarditis with persistent Bacteremia  Septic Pulmonary Emboli  - per ID recs, vancomycin  - end date abx: 4/4/2020  - LTAC placement denied; [id number] done, still denied  - SNF placement in progress    IVDU / heroin -abuse  - methadone  - will need outpt follow-up    Moderate Protein Malnutrition 2/2 IV Drug Abuse  - nutrition following    HCV  - pt will need outpt follow-up    Other chronic conditions:  Home meds as appropriate  Electrolyte derangement:  Trending BMP, Mg; Replacement prn  DVT ppx:  lovenox  FULL CODE    Discharge Planning:     SNF in progress    Subjective:      HPI:    34-year-old patient getting admitted with suspected sepsis from acute infective endocarditis  Patient is a IV drug abuse and uses heroin on a daily basis  For the past 2 days she has been having fever/myalgia/malaise and generalized weakness all over the body  Later she started having shortness of breath and patient did notice that her legs are getting more swollen  Because of the persistence of symptoms she came to the emergency room and got admitted  Patient initially thought she might have got cotton fever  Per patient if she will not take heroin every day she will develop severe withdrawal symptoms  No other issues.  She lives alone but has got a boyfriend  who lives very nearby    Hospital Course:    Patient was admitted with most likely bacterial endocarditis  She has daily urine IV drug user.  Methadone started yesterday and increased the dose further  Still having persistent fevers, blood culture with persistent  MRSA  Seen and examined the patient she is awake alert oriented and able to answer all the questions, off Precedex  Patient is going to need NAVA     3/3: Ms Gee is sedated on precedex and methadone. Pt arouses but has limited response to questions but she did move all extremities,     3/4: Pt is awake and alert with c/o 8/10 flank pain. Pt has 4/10 pain with oxycontin for break thru pain. She has no neurologic complaints. I have discussed the case with Dr Rodriguez and appreciate her imput. Pt nurse and I discussed the need for rehab Re opiate dependency.     3/5: Pt states that the methadone really helps her pain and opiate cravings. She has taken only 1 oxycodone today. Pt with seek treatment at a methadone clinic post discharge. Her side pain has improved from an 8/10 to < 4/10. Pt has improved enough to be sent to telemetry     3/6: Ms Elizondo has no complaints. Case discussed with Dr Rodriguez may go to an LTiiAC for further therapy. I will consult  for the above     3/7: I am feeling better, my side pain is low( 4/10 at worst) . Pt agrees to LTAC placement     3/8: The patient is achy but comfortable with pain < 4/10    3/10:  No pain today.  No SOB.  R ankle edema noted.    3/11:  Pt resting comfortably.  No pain.  No SOB.  No dizziness.    3/12:  LTAC denied.  I will not send home an IVDU with a PICC.  SNF now in progress.  Pt denies pain or SOB.    3/13:  SNF in progress.  No pain, no SOB.  In good spirits.    3/14: no pain.  No SOB.    Review of Systems   All systems reviewed and are negative except as noted per above.  Objective:     Physical Exam  BP (!) 133/93 Comment: patient just finished showering and taking a walk  Pulse 108   " Temp 98.5 °F (36.9 °C) (Oral)   Resp 18   Ht 5' 8" (1.727 m)   Wt 80.7 kg (177 lb 14.6 oz)   LMP  (LMP Unknown)   SpO2 97%   Breastfeeding? No   BMI 27.05 kg/m²     Gen: alert, responsive   HEENT:  Eyes - no pallor  External ears with no lesions  Nares patent  Mouth - lips chapped  CV: RRR, +murmur  Lungs: CTA B/L  Abd: +BS, soft, NT, ND  Ext: R ankle edema  Skin: warm, dry  Neuro: grossly intact  Psych: pleasant    Recent Labs   Lab 03/14/20  0500   WBC 8.37   HGB 8.0*   HCT 24.7*   *     Recent Labs   Lab 03/14/20  0500   CALCIUM 8.9   ALBUMIN 2.7*   PROT 8.0      K 4.2   CO2 27      BUN 23*   CREATININE 0.6   ALKPHOS 97   ALT 50*   AST 36   BILITOT 1.5*     No results found for: POCTGLUCOSE   Microbiology Results (last 7 days)     Procedure Component Value Units Date/Time    Blood culture [[id number]] Collected:  03/10/20 1155    Order Status:  Completed Specimen:  Blood from Line, PICC Right Basilic Updated:  03/14/20 1432     Blood Culture, Routine No Growth to date      No Growth to date      No Growth to date      No Growth to date      No Growth to date    Narrative:       Draw from picc    Blood culture [[id number]] Collected:  03/10/20 0420    Order Status:  Completed Specimen:  Blood from Line, PICC Right Brachial Updated:  03/14/20 0632     Blood Culture, Routine No Growth to date      No Growth to date      No Growth to date      No Growth to date      No Growth to date    Narrative:       Unit draw per picc line    Blood culture [[id number]] Collected:  03/09/20 1622    Order Status:  Completed Specimen:  Blood from Line, PICC Right Basilic Updated:  03/13/20 1832     Blood Culture, Routine No Growth to date      No Growth to date      No Growth to date      No Growth to date      No Growth to date    Narrative:       Draw from picc    MRSA Screen by PCR [[id number]] Collected:  03/12/20 0330    Order Status:  Completed Specimen:  Nasopharyngeal Swab from Nasal Updated:  " 03/13/20 0148     MRSA SCREEN BY PCR Negative    Blood culture [[id number]] Collected:  03/07/20 0656    Order Status:  Completed Specimen:  Blood Updated:  03/12/20 1032     Blood Culture, Routine No growth after 5 days.    Blood culture [[id number]] Collected:  03/05/20 0317    Order Status:  Completed Specimen:  Blood Updated:  03/10/20 0632     Blood Culture, Routine No growth after 5 days.    Blood culture [[id number]]  (Abnormal)  (Susceptibility) Collected:  03/06/20 0350    Order Status:  Completed Specimen:  Blood Updated:  03/09/20 0644     Blood Culture, Routine Gram stain aer bottle: Gram positive cocci      Results called to and read back by:Darius Olvera RN-[address] [place] [address];        03/07/2020  05:25 CJD      METHICILLIN RESISTANT STAPHYLOCOCCUS AUREUS  Known MRSA patient      Blood culture [[id number]] Collected:  03/04/20 0358    Order Status:  Completed Specimen:  Blood Updated:  03/09/20 0632     Blood Culture, Routine No growth after 5 days.        X-Ray Chest AP Portable   Final Result      Interval insertion of a right PICC as above         Electronically signed by: Cooper Coronel MD   Date:    03/06/2020   Time:    10:21      CT Chest Abdomen Pelvis With Contrast   Final Result      Bilateral diffuse nodular predominantly pleural based opacities without cavitation.  Findings are most consistent with septic emboli.  Multifocal pneumonia could not be excluded.  There is bibasilar airspace disease which may represent atelectasis or infiltrates with small bilateral pleural effusions      Mild hepatosplenomegaly.  The hyperechoic mass in the right lobe of the liver is not seen on CT and most likely represents hemangioma      No acute abnormality within the abdomen or pelvis         Electronically signed by: Sun Todd MD   Date:    03/03/2020   Time:    12:12      CT Head Without Contrast   Final Result      1. No acute intracranial abnormality.   2. Prominent focus of CSF density measuring 9 x 25 mm in  anteromedial left middle cranial fossa.  This most likely represents an incidental arachnoid cyst.  Further imaging evaluation with outpatient MRI brain without and with IV contrast is suggested.         Electronically signed by: Gallito Carmona MD   Date:    03/02/2020   Time:    17:24      X-Ray Chest AP Portable   Final Result      Improvement of the central hilar interstitial opacities with faint ground-glass opacities in lung bases suggestive of resolving edema         Electronically signed by: Sun Todd MD   Date:    03/02/2020   Time:    08:55      US Abdomen Limited   Final Result      1.  Sludge seen in the gallbladder, without findings to specifically suggest acute cholecystitis (noting borderline gallbladder wall thickening).      2.  Rounded echogenic focus in the right lobe of the liver, with imaging features of an hepatic hemangioma, consider ultrasound follow-up in 6 months to document stability.      3.  Numerous small periportal lymph nodes, likely reactive/inflammatory in nature.  Consider correlation with liver function tests and hepatitis serologies.      .         Electronically signed by: Daryl Kyle MD   Date:    02/29/2020   Time:    20:01      X-Ray Chest AP Portable   Final Result      Mild central hilar bronchovascular crowding, possibly secondary to low lung volumes and atelectasis or very mild edema, or atypical infection/bronchitis as above         Electronically signed by: Daryl Kyle MD   Date:    02/29/2020   Time:    15:28      X-Ray Chest AP Portable    (Results Pending)       All labs, images, and other studies - including those not included in this note -- were reviewed personally by me.    Inpatient medications  Scheduled Meds:   enoxaparin  40 mg Subcutaneous Daily    methadone  30 mg Oral Daily    vancomycin (VANCOCIN) IVPB  1,500 mg Intravenous Q10H     Continuous Infusions:  PRN Meds:.acetaminophen, calcium chloride IVPB, calcium chloride IVPB, calcium chloride  IVPB, ibuprofen, lorazepam, magnesium oxide, magnesium sulfate IVPB, magnesium sulfate IVPB, magnesium sulfate IVPB, magnesium sulfate IVPB, ondansetron, oxyCODONE, potassium chloride in water, potassium chloride in water, potassium chloride in water, potassium chloride in water, potassium chloride, potassium chloride, potassium chloride, potassium chloride, DIPH,PERTUS (ADACEL),TETANUS PF VAC (ADULT), Pharmacy to dose Vancomycin consult **AND** vancomycin - pharmacy to dose    Above encounter included review of the medical records, interviewing and examining the patient face-to-face, discussion with family and other health care providers, ordering and interpreting lab/test results and formulating a plan of care.     Stacie Fitzgerald MD  [facility] Hospitalist

## 2020-03-14 NOTE — PROGRESS NOTES
"Progress Note  Infectious Disease    Reason for Consult:  Probable endocarditis    02/29/2020.  Consult note. Soraya Gee is a  appearing 34 y.o. female with past medical history of hepatitis-C, IV drug use, in present for 3 years, released in March 2019, that is when she resumed IV drugs.  She comes in complaining of shaking chills, fever, myalgia, malaise, generalized weakness, 3 days duration after injecting some "bad drugs".  Initially she thought she had cotton fever but the symptoms persisted so she came to ER.  Patient also complains of shortness of breath, she was tachycardic and tachypneic on admission.  She has noticed swelling throughout.  Her albumin is low  Patient is a IV drug abuse and uses heroin twice a day, for a total daily dose of 1 g.  No sick contacts.  She is admitted in ICU for closer observation to make sure she does not withdrawal.  Patient is tachycardic, tachypneic, hurting all over, her history is limited by although this    03/01/2020.  Withdrawal symptoms are improved with Precedex drip; although she still feels like she is breathing fast and she is hurting all over.  She spiked fever of 101.3.  Blood cultures came positive for GPC.  No urinary complaints, no new skin or joint complaint, no cough or phlegm.  03/02/2020.  She spiked another fever of 102.3.  All cultures are turning up to be MRSA  CRP improved from 20/5 down to 13.  WBC is still elevated.  She gets confused on and off but then she interacts appropriately.    3/3:  Chart reviewed and discussed with Dr. Mccormack.  Persistently positive blood cultures with MRSA, vancomycin EDDIE 1.  Persistently hectic fever and septic appearance, improve hyperbilirubinemia, right upper quadrant tenderness, generalized aches and pains, sedated a bit on Precedex and methadone, neurologically intact.  3/4: still febrile through late last night. 3/2 cultures negative, but 3/3 cultures have GPC. CT chest/abd/pelvis reviewed and septic pulmonary " "emboli are present, but no spleen, muscle or paraspinous lesions noted.  She is currently up in the chair, off Precedex.  We discussed her diagnosis and she is willing to receive the treatment.  Does not have much appetite but she will drink the in [place].  She still hurts everywhere and does have some pleurisy.  3/5: temperature improving. Blood cultures from yesterday are negative so far. She looks much improved, clearer mentally, still very uncomfortable, somewhat anxious. Not remembering information given yesterday. Expresses desire to "get off " the drugs. Still has no appetite. Drinking a little ensure. No diarrhea. Still splinting from pleurisy. Discussed with her about the NAVA. Length of therapy.   3/6: afebrile. Blood cultures neg x 2 days. Very anxious/hyperventilating, about NAVA. D/w anesthesia. Reassured her. No nausea, vomiting. Ok for picc line.  3/7: afebrile. VSS. 1 blood culture from yesterday drawn peripherally "left wrist" has GPC. I cannot confirm that it wasn't drawn from midline but site is different. NAVA demonstrated TV vegetation with flail leaflet and TR. No change in vision, no new MSK pain, or skin lesion. Still has some pleural discomfort associated with septic emboli.   3/8: 3/6 blood culture identified as Staph aureus. She went outside but now knows she is restricted. She has showered and put on make up. She is eating well. No diarrhea. Mild LE edema which she states is not new. No antibiotic intolerance. No change in vision, new MSK pain, new skin lesion  3/9: blood cultures not drawn until this afternoon. She feels well. Eating well. Would like to walk more. LTAC eval in progress.   3/10:  In great spirits, recent blood cultures negative.  Was visited by representative from the LTAC of her choice.  Changed over to vancomycin again.  No pleuritic complaints, appetite is good, no diarrhea, no problems with PICC line today.  No change in vision, no new musculoskeletal pain or skin " "lesions.    03/13/2020 Tmax 99.4.  afebrile now.  No chills.  She has the same chest discomfort, 2/10, worse when breathing in deep, better with rest, no associated cough or phlegm.  She admits that she feels way better than a week ago.  Good appetite.  No nausea no vomiting no diarrhea.      03/14/2020.  Patient feels well.  She states she gets some temps on and off She had a temperature of 100.6°.  WBC is still low at 8.37.      Antibiotics (From admission, onward)    Start     Stop Route Frequency Ordered    03/13/20 0200  vancomycin (VANCOCIN) 1,500 mg in dextrose 5 % 500 mL IVPB     Note to Pharmacy:  Ht: 5' 8" (1.727 m)  Wt: 80.7 kg (177 lb 14.6 oz)  Estimated Creatinine Clearance: 178.4 mL/min (based on SCr of 0.5 mg/dL).      -- IV Every 10 hours 03/13/20 0131    03/10/20 1202  vancomycin - pharmacy to dose  (vancomycin IVPB)      -- IV pharmacy to manage frequency 03/10/20 1102        Antifungals (From admission, onward)    None        Antivirals (From admission, onward)    None            EXAM & DIAGNOSTICS REVIEWED:   Vitals:     Temp:  [97.8 °F (36.6 °C)-100.6 °F (38.1 °C)]   Temp: 98.5 °F (36.9 °C) (03/14/20 1149)  Pulse: 108 (03/14/20 1149)  Resp: 18 (03/14/20 1149)  BP: (!) 133/93(patient just finished showering and taking a walk) (03/14/20 1149)  SpO2: 97 % (03/14/20 1149)    Intake/Output Summary (Last 24 hours) at 3/14/2020 1559  Last data filed at 3/14/2020 1300  Gross per 24 hour   Intake 1080 ml   Output --   Net 1080 ml     Vitals:    03/14/20 1149   BP: (!) 133/93   Pulse: 108   Resp: 18   Temp: 98.5 °F (36.9 °C)         General:  Alert cooperative , looks and feels great   Eyes:  anicteric, PERRL, EOMI, no scleral conjunctival lesions  ENT:  No ulcers, exudates, thrush, nares patent, edentulous/dentures.     Neck:  Supple,   Lungs: clear  Heart:  Regular,  soft 1/6 systolic murmur  Abd:  Soft, ND, normal BS, nontender  :  Voiding without difficulty  Musc:  No focal joint swelling, " synovitis, myositis, moves all extremities  Skin:  Marks from prior IV use.    without septic emboli, no subungual petechia   Wound:   Neuro:  speech fluent, face symmetric, moves all extremities, no focal weakness.   Psych:  Alert, pleasant, motivated  cooperative   Lymphatic:        Extrem:  No palpable edema  , phlebitis, cellulitis, warm and well perfused  VAD:   midline left arm 3/5 picc right on  Isolation:  Contact    Lines/Tubes/Drains:  Peripheral IV    General Labs reviewed:  Recent Labs   Lab 03/11/20 0442 03/12/20 0349 03/14/20  0500   WBC 10.08 11.24 8.37   HGB 8.0* 8.7* 8.0*   HCT 24.6* 26.6* 24.7*   * 514* 383*       Recent Labs   Lab 03/11/20 0442 03/12/20 0349 03/14/20  0500    135* 136   K 4.1 4.2 4.2    103 102   CO2 27 26 27   BUN 16 19 23*   CREATININE 0.7 0.6 0.6   CALCIUM 8.9 8.8 8.9   PROT 8.0 8.4 8.0   BILITOT 1.6* 1.8* 1.5*   ALKPHOS 105 107 97   ALT 40 46* 50*   AST 32 35 36       Recent Labs   Lab 03/10/20  0436   CRP 2.52*  2.52*             Micro:  Microbiology Results (last 7 days)     Procedure Component Value Units Date/Time    Blood culture [[id number]] Collected:  03/10/20 1155    Order Status:  Completed Specimen:  Blood from Line, PICC Right Basilic Updated:  03/14/20 1432     Blood Culture, Routine No Growth to date      No Growth to date      No Growth to date      No Growth to date      No Growth to date    Narrative:       Draw from picc    Blood culture [[id number]] Collected:  03/10/20 0420    Order Status:  Completed Specimen:  Blood from Line, PICC Right Brachial Updated:  03/14/20 0632     Blood Culture, Routine No Growth to date      No Growth to date      No Growth to date      No Growth to date      No Growth to date    Narrative:       Unit draw per picc line    Blood culture [[id number]] Collected:  03/09/20 1622    Order Status:  Completed Specimen:  Blood from Line, PICC Right Basilic Updated:  03/13/20 1832     Blood Culture, Routine No  Growth to date      No Growth to date      No Growth to date      No Growth to date      No Growth to date    Narrative:       Draw from picc    MRSA Screen by PCR [[id number]] Collected:  03/12/20 0330    Order Status:  Completed Specimen:  Nasopharyngeal Swab from Nasal Updated:  03/13/20 0148     MRSA SCREEN BY PCR Negative    Blood culture [[id number]] Collected:  03/07/20 0656    Order Status:  Completed Specimen:  Blood Updated:  03/12/20 1032     Blood Culture, Routine No growth after 5 days.    Blood culture [[id number]] Collected:  03/05/20 0317    Order Status:  Completed Specimen:  Blood Updated:  03/10/20 0632     Blood Culture, Routine No growth after 5 days.    Blood culture [[id number]]  (Abnormal)  (Susceptibility) Collected:  03/06/20 0350    Order Status:  Completed Specimen:  Blood Updated:  03/09/20 0644     Blood Culture, Routine Gram stain aer bottle: Gram positive cocci      Results called to and read back by:Darius Olvera RN-[address] [place] [address];        03/07/2020  05:25 CJD      METHICILLIN RESISTANT STAPHYLOCOCCUS AUREUS  Known MRSA patient      Blood culture [[id number]] Collected:  03/04/20 0358    Order Status:  Completed Specimen:  Blood Updated:  03/09/20 0632     Blood Culture, Routine No growth after 5 days.        Imaging Reviewed:  Chest x-rays    CXR Mild central hilar bronchovascular crowding, possibly secondary to low lung volumes and atelectasis or very mild edema, or atypical infection/bronchitis as above    US1.  Sludge seen in the gallbladder, without findings to specifically suggest acute cholecystitis (noting borderline gallbladder wall thickening).  2.  Rounded echogenic focus in the right lobe of the liver, with imaging features of an hepatic hemangioma, consider ultrasound follow-up in 6 months to document stability.  3.  Numerous small periportal lymph nodes, likely reactive/inflammatory in nature.  Consider correlation with liver function tests and hepatitis serologies.    CT  chest/abd/pelvis 3/3  Bilateral diffuse nodular predominantly pleural based opacities without cavitation.  Findings are most consistent with septic emboli.  Multifocal pneumonia could not be excluded.  There is bibasilar airspace disease which may represent atelectasis or infiltrates with small bilateral pleural effusions  Mild hepatosplenomegaly.  The hyperechoic mass in the right lobe of the liver is not seen on CT and most likely represents hemangioma  No acute abnormality within the abdomen or pelvis    Cardiology:    Transthoracic echo 3/1  Left Ventricle Normal ejection fraction at 60%. Normal left ventricular cavity size. Normal wall thickness observed. Normal left ventricular diastolic function. Normal left atrial pressure. No wall motion abnormalities.   Right Ventricle Normal cavity size, wall thickness and systolic function. Wall motion normal.   Left Atrium The left atrium is normal.   Right Atrium There is normal right atrial size.   Aortic Valve The aortic valve appears structurally normal. There is no stenosis. No regurgitation. There is normal leaflet mobility.The LVOT diameter is 2.02 cm.   Mitral Valve The mitral valve appears structurally normal. There is normal leaflet mobility. No regurgitation.   Tricuspid Valve The tricuspid valve appears structurally normal. No stenosis. Trace regurgitation. There is normal leaflet mobility. The estimated PA systolic pressure is 26 mmHg.   Pulmonic Valve The pulmonic valve was not well visualized. No stenosis. No regurgitation.   IVC/SVC Normal central venous pressure (3 mm Hg).   Ascending Aorta The aortic root and ascending aorta are normal in size.   Pericardium No pericardial effusion.     NAVA 3/6:  1. Aortic valve is structurally normal with good leaflet excursion. No significant regurgitation.   2. Mitral valve is structurally normal with good leaflet excursion. Trace regurgitation.   3. Tricuspid valve: There appears to be a small vegetation (0.9 X 0.4  cm) attached to the base of the septal leaflet. There also appears to be prolapse and flail of the leaflets causing severe eccentric tricuspid regurgitation.   4. Pulmonary valve is structurally normal with good leaflet excursion. No significant regurgitation.  5. Overall LVEF and RVEF appears normal.   6. No obvious shunt across the interatrial septum by color flow Doppler and agitated saline.     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 Septic pulmonary emboli  Flail valve leaflet with tricuspid regurg  Elevated markers of inflammation including procal, CRP, ESR. improving  TELLY. resolved   Epigastric right upper quadrant discomfort and generalized abdominal discomfort.-resolved  Jaundice Transaminitis, heavy opiate use, H/o Hep C, untreated, ammonia normal:  Bilirubin improved  IVDU. heroin  Anemia;   Protein malnutrition.  Hypoalbuminemia =  2.6; anxiety  This patient is high risk for life-threatening deterioration and death secondary to above comorbidities and need for IV treatment.      Recommendations:  Patient has been treated with appropriate antibiotics since 02/29/2020.  Initially with vancomycin and Dapto;   then with daptomycin and Teflaro.  Converted  back to vancomycin  On 03/10   End date of IV antibiotics: 4/4, =/-followed by po doxy for lungs x 2 weeks??    LTAC placment in progress    I am not sure why patient develop 100.6 temperature.  Will investigate.  Also will discontinue left midline

## 2020-03-15 PROBLEM — I07.9 ENDOCARDITIS OF TRICUSPID VALVE: Status: ACTIVE | Noted: 2020-03-15

## 2020-03-15 PROBLEM — B95.62 MRSA BACTEREMIA: Status: ACTIVE | Noted: 2020-03-15

## 2020-03-15 PROBLEM — N17.9 AKI (ACUTE KIDNEY INJURY): Status: RESOLVED | Noted: 2020-02-29 | Resolved: 2020-03-15

## 2020-03-15 PROBLEM — D64.9 ANEMIA: Status: ACTIVE | Noted: 2020-03-15

## 2020-03-15 PROBLEM — I33.0 ACUTE BACTERIAL ENDOCARDITIS: Status: ACTIVE | Noted: 2020-02-29

## 2020-03-15 PROBLEM — D63.8 ANEMIA OF CHRONIC DISEASE: Status: ACTIVE | Noted: 2020-03-15

## 2020-03-15 PROBLEM — R78.81 MRSA BACTEREMIA: Status: ACTIVE | Noted: 2020-03-15

## 2020-03-15 LAB
BACTERIA BLD CULT: NORMAL
BACTERIA BLD CULT: NORMAL
CRP SERPL-MCNC: 1.87 MG/DL (ref 0–0.75)
PROCALCITONIN SERPL IA-MCNC: 0.08 NG/ML (ref 0–0.5)
VANCOMYCIN TROUGH SERPL-MCNC: 23.6 UG/ML (ref 10–22)

## 2020-03-15 PROCEDURE — 12000002 HC ACUTE/MED SURGE SEMI-PRIVATE ROOM

## 2020-03-15 PROCEDURE — 63600175 PHARM REV CODE 636 W HCPCS: Performed by: FAMILY MEDICINE

## 2020-03-15 PROCEDURE — 36415 COLL VENOUS BLD VENIPUNCTURE: CPT

## 2020-03-15 PROCEDURE — 87040 BLOOD CULTURE FOR BACTERIA: CPT

## 2020-03-15 PROCEDURE — 80202 ASSAY OF VANCOMYCIN: CPT

## 2020-03-15 PROCEDURE — 25000003 PHARM REV CODE 250: Performed by: INTERNAL MEDICINE

## 2020-03-15 PROCEDURE — 84145 PROCALCITONIN (PCT): CPT

## 2020-03-15 PROCEDURE — 86140 C-REACTIVE PROTEIN: CPT

## 2020-03-15 RX ADMIN — METHADONE HYDROCHLORIDE 30 MG: 10 TABLET ORAL at 08:03

## 2020-03-15 RX ADMIN — VANCOMYCIN HYDROCHLORIDE 1500 MG: 1 INJECTION, POWDER, LYOPHILIZED, FOR SOLUTION INTRAVENOUS at 08:03

## 2020-03-15 NOTE — PROGRESS NOTES
"Progress Note  Infectious Disease    Reason for Consult:  Probable endocarditis    02/29/2020.  Consult note. Soraya Gee is a  appearing 34 y.o. female with past medical history of hepatitis-C, IV drug use, in present for 3 years, released in March 2019, that is when she resumed IV drugs.  She comes in complaining of shaking chills, fever, myalgia, malaise, generalized weakness, 3 days duration after injecting some "bad drugs".  Initially she thought she had cotton fever but the symptoms persisted so she came to ER.  Patient also complains of shortness of breath, she was tachycardic and tachypneic on admission.  She has noticed swelling throughout.  Her albumin is low  Patient is a IV drug abuse and uses heroin twice a day, for a total daily dose of 1 g.  No sick contacts.  She is admitted in ICU for closer observation to make sure she does not withdrawal.  Patient is tachycardic, tachypneic, hurting all over, her history is limited by although this    03/01/2020.  Withdrawal symptoms are improved with Precedex drip; although she still feels like she is breathing fast and she is hurting all over.  She spiked fever of 101.3.  Blood cultures came positive for GPC.  No urinary complaints, no new skin or joint complaint, no cough or phlegm.  03/02/2020.  She spiked another fever of 102.3.  All cultures are turning up to be MRSA  CRP improved from 20/5 down to 13.  WBC is still elevated.  She gets confused on and off but then she interacts appropriately.    3/3:  Chart reviewed and discussed with Dr. Mccormack.  Persistently positive blood cultures with MRSA, vancomycin EDDIE 1.  Persistently hectic fever and septic appearance, improve hyperbilirubinemia, right upper quadrant tenderness, generalized aches and pains, sedated a bit on Precedex and methadone, neurologically intact.  3/4: still febrile through late last night. 3/2 cultures negative, but 3/3 cultures have GPC. CT chest/abd/pelvis reviewed and septic pulmonary " "emboli are present, but no spleen, muscle or paraspinous lesions noted.  She is currently up in the chair, off Precedex.  We discussed her diagnosis and she is willing to receive the treatment.  Does not have much appetite but she will drink the in [place].  She still hurts everywhere and does have some pleurisy.  3/5: temperature improving. Blood cultures from yesterday are negative so far. She looks much improved, clearer mentally, still very uncomfortable, somewhat anxious. Not remembering information given yesterday. Expresses desire to "get off " the drugs. Still has no appetite. Drinking a little ensure. No diarrhea. Still splinting from pleurisy. Discussed with her about the NAVA. Length of therapy.   3/6: afebrile. Blood cultures neg x 2 days. Very anxious/hyperventilating, about NAVA. D/w anesthesia. Reassured her. No nausea, vomiting. Ok for picc line.  3/7: afebrile. VSS. 1 blood culture from yesterday drawn peripherally "left wrist" has GPC. I cannot confirm that it wasn't drawn from midline but site is different. NAVA demonstrated TV vegetation with flail leaflet and TR. No change in vision, no new MSK pain, or skin lesion. Still has some pleural discomfort associated with septic emboli.   3/8: 3/6 blood culture identified as Staph aureus. She went outside but now knows she is restricted. She has showered and put on make up. She is eating well. No diarrhea. Mild LE edema which she states is not new. No antibiotic intolerance. No change in vision, new MSK pain, new skin lesion  3/9: blood cultures not drawn until this afternoon. She feels well. Eating well. Would like to walk more. LTAC eval in progress.   3/10:  In great spirits, recent blood cultures negative.  Was visited by representative from the LTAC of her choice.  Changed over to vancomycin again.  No pleuritic complaints, appetite is good, no diarrhea, no problems with PICC line today.  No change in vision, no new musculoskeletal pain or skin " "lesions.    03/13/2020 Tmax 99.4.  afebrile now.  No chills.  She has the same chest discomfort, 2/10, worse when breathing in deep, better with rest, no associated cough or phlegm.  She admits that she feels way better than a week ago.  Good appetite.  No nausea no vomiting no diarrhea.      03/14/2020.  Patient feels well.  She states she gets some temps on and off She had a temperature of 100.6°.  WBC is still low at 8.37.  03/15/2020.  Patient had no more fevers.  She continues to tolerate vancomycin well.  She has no new complaints.  She workup from yesterday looks negative so far.  Left arm midline has been discontinued.  Discussed with hospitalist.  Discussed with patient.    Antibiotics (From admission, onward)    Start     Stop Route Frequency Ordered    03/15/20 0800  vancomycin (VANCOCIN) 1,500 mg in dextrose 5 % 500 mL IVPB     Note to Pharmacy:  Ht: 5' 8" (1.727 m)  Wt: 80.7 kg (177 lb 14.6 oz)  Estimated Creatinine Clearance: 148.6 mL/min    -- IV Every 16 hours 03/15/20 0434    03/10/20 1202  vancomycin - pharmacy to dose  (vancomycin IVPB)      -- IV pharmacy to manage frequency 03/10/20 1102        Antifungals (From admission, onward)    None        Antivirals (From admission, onward)    None            EXAM & DIAGNOSTICS REVIEWED:   Vitals:     Temp:  [97.4 °F (36.3 °C)-99.2 °F (37.3 °C)]   Temp: 98 °F (36.7 °C) (03/15/20 1222)  Pulse: 86 (03/15/20 1222)  Resp: 17 (03/15/20 1222)  BP: 97/61 (03/15/20 1222)  SpO2: 97 % (03/15/20 1222)    Intake/Output Summary (Last 24 hours) at 3/15/2020 1320  Last data filed at 3/14/2020 1800  Gross per 24 hour   Intake 510 ml   Output --   Net 510 ml     Vitals:    03/15/20 1222   BP: 97/61   Pulse: 86   Resp: 17   Temp: 98 °F (36.7 °C)         General:  Alert cooperative , looks and feels great   Eyes:  anicteric, PERRL, EOMI, no scleral conjunctival lesions  ENT:  No ulcers, exudates, thrush, nares patent, edentulous/dentures.     Neck:  Supple, "   Lungs: clear  Heart:  Regular,  soft 1/6 systolic murmur  Abd:  Soft, ND, normal BS, nontender  :  Voiding without difficulty  Musc:  No focal joint swelling, synovitis, myositis, moves all extremities  Skin:  Marks from prior IV use.    without septic emboli, no subungual petechia   Wound:   Neuro:  speech fluent, face symmetric, moves all extremities, no focal weakness.   Psych:  Alert, pleasant, motivated  cooperative   Lymphatic:        Extrem:  No palpable edema  , phlebitis, cellulitis, warm and well perfused  VAD:   picc right  Isolation:  Contact    Lines/Tubes/Drains:  Peripheral IV    General Labs reviewed:  Recent Labs   Lab 03/11/20  0442 03/12/20  0349 03/14/20  0500   WBC 10.08 11.24 8.37   HGB 8.0* 8.7* 8.0*   HCT 24.6* 26.6* 24.7*   * 514* 383*       Recent Labs   Lab 03/11/20  0442 03/12/20  0349 03/14/20  0500    135* 136   K 4.1 4.2 4.2    103 102   CO2 27 26 27   BUN 16 19 23*   CREATININE 0.7 0.6 0.6   CALCIUM 8.9 8.8 8.9   PROT 8.0 8.4 8.0   BILITOT 1.6* 1.8* 1.5*   ALKPHOS 105 107 97   ALT 40 46* 50*   AST 32 35 36       Recent Labs   Lab 03/10/20  0436 03/15/20  0505   CRP 2.52*  2.52* 1.87*             Micro:  Microbiology Results (last 7 days)     Procedure Component Value Units Date/Time    Blood culture [[id number]] Collected:  03/15/20 0505    Order Status:  Completed Specimen:  Blood Updated:  03/15/20 1158     Blood Culture, Routine No Growth to date    Narrative:       Collection has been rescheduled by KS3 at 03/15/2020 04:18 Reason: pt   wants to see if RN will draw from PICC  Collection has been rescheduled by KS3 at 03/15/2020 04:18 Reason: pt   wants to see if RN will draw from PICC    Blood culture [[id number]] Collected:  03/10/20 0420    Order Status:  Completed Specimen:  Blood from Line, PICC Right Brachial Updated:  03/15/20 0632     Blood Culture, Routine No growth after 5 days.    Narrative:       Unit draw per picc line    Blood culture [[id number]]  Collected:  03/09/20 1622    Order Status:  Completed Specimen:  Blood from Line, PICC Right Basilic Updated:  03/14/20 1832     Blood Culture, Routine No growth after 5 days.    Narrative:       Draw from picc    Blood culture [[id number]] Collected:  03/10/20 1155    Order Status:  Completed Specimen:  Blood from Line, PICC Right Basilic Updated:  03/14/20 1432     Blood Culture, Routine No Growth to date      No Growth to date      No Growth to date      No Growth to date      No Growth to date    Narrative:       Draw from picc    MRSA Screen by PCR [[id number]] Collected:  03/12/20 0330    Order Status:  Completed Specimen:  Nasopharyngeal Swab from Nasal Updated:  03/13/20 0148     MRSA SCREEN BY PCR Negative    Blood culture [[id number]] Collected:  03/07/20 0656    Order Status:  Completed Specimen:  Blood Updated:  03/12/20 1032     Blood Culture, Routine No growth after 5 days.    Blood culture [[id number]] Collected:  03/05/20 0317    Order Status:  Completed Specimen:  Blood Updated:  03/10/20 0632     Blood Culture, Routine No growth after 5 days.    Blood culture [[id number]]  (Abnormal)  (Susceptibility) Collected:  03/06/20 0350    Order Status:  Completed Specimen:  Blood Updated:  03/09/20 0644     Blood Culture, Routine Gram stain aer bottle: Gram positive cocci      Results called to and read back by:Darius Olvera [id number];        03/07/2020  05:25 CJD      METHICILLIN RESISTANT STAPHYLOCOCCUS AUREUS  Known MRSA patient      Blood culture [[id number]] Collected:  03/04/20 0358    Order Status:  Completed Specimen:  Blood Updated:  03/09/20 0632     Blood Culture, Routine No growth after 5 days.        Imaging Reviewed:  Chest x-rays    CXR Mild central hilar bronchovascular crowding, possibly secondary to low lung volumes and atelectasis or very mild edema, or atypical infection/bronchitis as above    US1.  Sludge seen in the gallbladder, without findings to specifically suggest acute cholecystitis  (noting borderline gallbladder wall thickening).  2.  Rounded echogenic focus in the right lobe of the liver, with imaging features of an hepatic hemangioma, consider ultrasound follow-up in 6 months to document stability.  3.  Numerous small periportal lymph nodes, likely reactive/inflammatory in nature.  Consider correlation with liver function tests and hepatitis serologies.    CT chest/abd/pelvis 3/3  Bilateral diffuse nodular predominantly pleural based opacities without cavitation.  Findings are most consistent with septic emboli.  Multifocal pneumonia could not be excluded.  There is bibasilar airspace disease which may represent atelectasis or infiltrates with small bilateral pleural effusions  Mild hepatosplenomegaly.  The hyperechoic mass in the right lobe of the liver is not seen on CT and most likely represents hemangioma  No acute abnormality within the abdomen or pelvis    Cardiology:    Transthoracic echo 3/1  Left Ventricle Normal ejection fraction at 60%. Normal left ventricular cavity size. Normal wall thickness observed. Normal left ventricular diastolic function. Normal left atrial pressure. No wall motion abnormalities.   Right Ventricle Normal cavity size, wall thickness and systolic function. Wall motion normal.   Left Atrium The left atrium is normal.   Right Atrium There is normal right atrial size.   Aortic Valve The aortic valve appears structurally normal. There is no stenosis. No regurgitation. There is normal leaflet mobility.The LVOT diameter is 2.02 cm.   Mitral Valve The mitral valve appears structurally normal. There is normal leaflet mobility. No regurgitation.   Tricuspid Valve The tricuspid valve appears structurally normal. No stenosis. Trace regurgitation. There is normal leaflet mobility. The estimated PA systolic pressure is 26 mmHg.   Pulmonic Valve The pulmonic valve was not well visualized. No stenosis. No regurgitation.   IVC/SVC Normal central venous pressure (3 mm Hg).    Ascending Aorta The aortic root and ascending aorta are normal in size.   Pericardium No pericardial effusion.     NAVA 3/6:  1. Aortic valve is structurally normal with good leaflet excursion. No significant regurgitation.   2. Mitral valve is structurally normal with good leaflet excursion. Trace regurgitation.   3. Tricuspid valve: There appears to be a small vegetation (0.9 X 0.4 cm) attached to the base of the septal leaflet. There also appears to be prolapse and flail of the leaflets causing severe eccentric tricuspid regurgitation.   4. Pulmonary valve is structurally normal with good leaflet excursion. No significant regurgitation.  5. Overall LVEF and RVEF appears normal.   6. No obvious shunt across the interatrial septum by color flow Doppler and agitated saline.     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 Septic pulmonary emboli  Flail valve leaflet with tricuspid regurg  Elevated markers of inflammation including procal, CRP, ESR. improving  Recent temp on 03/13/2020, 100.6.  Workup negative so far.     TELLY. resolved   Epigastric right upper quadrant discomfort and generalized abdominal discomfort.-resolved  Jaundice Transaminitis, heavy opiate use, H/o Hep C, untreated, ammonia normal:  Bilirubin improved  IVDU. heroin  Anemia;   Protein malnutrition.  Hypoalbuminemia =  2.6; anxiety  CRP trend 14.9--10.18--2.52--1.87  Procalcitonin 4.71--0.08.    Recommendations:  Patient has been treated with appropriate antibiotics since 02/29/2020.  Initially with vancomycin and Dapto;   then with daptomycin and Teflaro.  Converted  back to vancomycin  On 03/10   End date of IV antibiotics: 4/4, =/-followed by po doxy for lungs x 2 weeks?   LTAC placment in progress  Workup for the fever of 3/13 has not shown anything.  Continue to monitor.

## 2020-03-15 NOTE — PROGRESS NOTES
[facility] Medicine  Progress Note    Patient Name: Soraya Gee  MRN: [id number]  Patient Class: IP- Inpatient   Admission Date: 2/29/2020  Length of Stay: 15 days  Attending Physician: Sumeet Rodriguez MD  Primary Care Provider: Jus Griggs Iii, MD        Subjective:     Principal Problem:Acute bacterial endocarditis        HPI:  34-year-old patient getting admitted with suspected sepsis from acute infective endocarditis  Patient is a IV drug abuse and uses heroin on a daily basis  For the past 2 days she has been having fever/myalgia/malaise and generalized weakness all over the body  Later she started having shortness of breath and patient did notice that her legs are getting more swollen  Because of the persistence of symptoms she came to the emergency room and got admitted  Patient initially thought she might have got cotton fever  Per patient if she will not take heroin every day she will develop severe withdrawal symptoms  No other issues.  She lives alone but has got a boyfriend who lives very nearby       Interval History:  No overnight events reported.  Denies overnight fever or chills.  Had low-grade fever of 100.6° F on 03/13; left arm midline since removed.  No erythema, swelling or pain reported.  Denies chest pain, shortness of breath or diarrhea.      Objective:     Vital Signs (Most Recent):  Temp: 98.5 °F (36.9 °C) (03/15/20 1705)  Pulse: 93 (03/15/20 1705)  Resp: 18 (03/15/20 1705)  BP: 112/66 (03/15/20 1705)  SpO2: 99 % (03/15/20 1705) Vital Signs (24h Range):  Temp:  [98 °F (36.7 °C)-99.2 °F (37.3 °C)] 98.5 °F (36.9 °C)  Pulse:  [81-97] 93  Resp:  [16-20] 18  SpO2:  [91 %-100 %] 99 %  BP: ()/(61-75) 112/66     Weight: 80.7 kg (177 lb 14.6 oz)  Body mass index is 27.05 kg/m².    Intake/Output Summary (Last 24 hours) at 3/15/2020 2917  Last data filed at 3/14/2020 1800  Gross per 24 hour   Intake 510 ml   Output --   Net 510 ml      Physical Exam    Constitutional: She is oriented to person, place, and time. She appears well-developed and well-nourished. No distress.   HENT:   Head: Normocephalic and atraumatic.   Eyes: Conjunctivae are normal. No scleral icterus.   Neck: Neck supple. No thyromegaly present.   Cardiovascular: Normal rate and regular rhythm.   Murmur heard.  Pulmonary/Chest: Effort normal and breath sounds normal. No respiratory distress.   Abdominal: Soft. Bowel sounds are normal. She exhibits no distension. There is no tenderness.   Musculoskeletal: She exhibits no edema or deformity.   Neurological: She is alert and oriented to person, place, and time. She has normal strength. No sensory deficit.   Skin: Skin is warm and dry. Capillary refill takes less than 2 seconds. No erythema.   Right arm PICC line noted    Psychiatric: She has a normal mood and affect. Her behavior is normal.   Nursing note and vitals reviewed.      Significant Labs:   CBC:   Recent Labs   Lab 03/14/20  0500   WBC 8.37   HGB 8.0*   HCT 24.7*   *     CMP:   Recent Labs   Lab 03/14/20  0500      K 4.2      CO2 27   GLU 92   BUN 23*   CREATININE 0.6   CALCIUM 8.9   PROT 8.0   ALBUMIN 2.7*   BILITOT 1.5*   ALKPHOS 97   AST 36   ALT 50*   ANIONGAP 7*   EGFRNONAA >60.0       Significant Imaging: I have reviewed all pertinent imaging results/findings within the past 24 hours.   Procedure Component Value Units Date/Time   X-Ray Chest PA And Lateral [[id number]] Resulted: 03/14/20 1732   Order Status: Completed Updated: 03/14/20 1735   Narrative:     EXAMINATION:  XR CHEST PA AND LATERAL    CLINICAL HISTORY:  fever;    COMPARISON:  03/06/2020 chest radiograph and 03/03/2020 chest CT    FINDINGS:  Right PICC is in stable position.  Several ill-defined pulmonary nodules are present within both lungs, correlating to recent chest CT findings.  Small linear focus within left mid lung likely reflects atelectasis or scarring.  No pleural fluid is evident.  No  pneumothorax.   Impression:       Nodular opacities within both lungs correlate with recent chest CT findings, at which time septic emboli were suspected.      Electronically signed by: Valeriano Whtitaker MD  Date: 03/14/2020  Time: 17:32            Microbiology Results (last 7 days)     Procedure Component Value Units Date/Time    Blood culture [[id number]] Collected:  03/10/20 1155    Order Status:  Completed Specimen:  Blood from Line, PICC Right Basilic Updated:  03/15/20 1432     Blood Culture, Routine No growth after 5 days.    Narrative:       Draw from picc    Blood culture [[id number]] Collected:  03/15/20 0505    Order Status:  Completed Specimen:  Blood Updated:  03/15/20 1158     Blood Culture, Routine No Growth to date    Narrative:       Collection has been rescheduled by KS3 at 03/15/2020 04:18 Reason: pt   wants to see if RN will draw from PICC  Collection has been rescheduled by KS3 at 03/15/2020 04:18 Reason: pt   wants to see if RN will draw from PICC    Blood culture [[id number]] Collected:  03/10/20 0420    Order Status:  Completed Specimen:  Blood from Line, PICC Right Brachial Updated:  03/15/20 0632     Blood Culture, Routine No growth after 5 days.    Narrative:       Unit draw per picc line    Blood culture [[id number]] Collected:  03/09/20 1622    Order Status:  Completed Specimen:  Blood from Line, PICC Right Basilic Updated:  03/14/20 1832     Blood Culture, Routine No growth after 5 days.    Narrative:       Draw from picc    MRSA Screen by PCR [[id number]] Collected:  03/12/20 0330    Order Status:  Completed Specimen:  Nasopharyngeal Swab from Nasal Updated:  03/13/20 0148     MRSA SCREEN BY PCR Negative    Blood culture [[id number]] Collected:  03/07/20 0656    Order Status:  Completed Specimen:  Blood Updated:  03/12/20 1032     Blood Culture, Routine No growth after 5 days.    Blood culture [[id number]] Collected:  03/05/20 0317    Order Status:  Completed Specimen:  Blood Updated:  03/10/20  0632     Blood Culture, Routine No growth after 5 days.    Blood culture [[id number]]  (Abnormal)  (Susceptibility) Collected:  03/06/20 0350    Order Status:  Completed Specimen:  Blood Updated:  03/09/20 0644     Blood Culture, Routine Gram stain aer bottle: Gram positive cocci      Results called to and read back by:Darius Olvera [id number];        03/07/2020  05:25 CJD      METHICILLIN RESISTANT STAPHYLOCOCCUS AUREUS  Known MRSA patient      Blood culture [[id number]] Collected:  03/04/20 0358    Order Status:  Completed Specimen:  Blood Updated:  03/09/20 0632     Blood Culture, Routine No growth after 5 days.          Assessment/Plan:     Active Hospital Problems    Diagnosis  POA    *Acute bacterial endocarditis [I33.0]  Yes    Endocarditis of tricuspid valve [I36.8]  Yes    MRSA bacteremia [R78.81]  Yes    Anemia of chronic disease [D63.8]  Yes    IVDU (intravenous drug user) [F19.90]  Yes    Hepatitis C [B19.20]  Yes      Resolved Hospital Problems    Diagnosis Date Resolved POA    TELLY (acute kidney injury) [N17.9] 03/15/2020 Yes       Plan:  Continue IV vancomycin with dosing and monitoring as per pharmacy (stop date is 04/04/2020)  Monitor for fevers.  Appreciate ID input  Given history of IV drug abuse and need for long-term antibiotics she will need placement; case management working on the same  Continue methadone for opiate use disorder  Outpatient follow-up for hepatitis C      VTE Risk Mitigation (From admission, onward)         Ordered     enoxaparin injection 40 mg  Daily      02/29/20 1725     IP VTE HIGH RISK PATIENT  Once      02/29/20 1725                      Sumeet Rodriguez MD  Department of Hospital Medicine   [facility]

## 2020-03-15 NOTE — PROGRESS NOTES
"VANCOMYCIN PHARMACOKINETIC NOTE:  Vancomycin Day #9    Objective:    34 y.o., female, Actual Body Weight = 80.7 kg (177 lb 14.6 oz)        Diagnosis/Indication for Vancomycin:  Bacteremia   Desired Vancomycin Peak:  35-40 mcg/ml; Desired Trough: 15-20 mcg/ml    Current Vancomycin Regimen:  1500 IV every 10 hours        Receiving other antibiotics:   Antibiotics (From admission, onward)      Start     Stop Route Frequency Ordered    03/15/20 0800  vancomycin (VANCOCIN) 1,500 mg in dextrose 5 % 500 mL IVPB     Note to Pharmacy:  Ht: 5' 8" (1.727 m)  Wt: 80.7 kg (177 lb 14.6 oz)  Estimated Creatinine Clearance: 148.6 mL/min    -- IV Every 16 hours 03/15/20 0434    03/10/20 1202  vancomycin - pharmacy to dose  (vancomycin IVPB)      -- IV pharmacy to manage frequency 03/10/20 1102             The patient has the following labs:     3/15/2020 Estimated Creatinine Clearance: 148.6 mL/min (based on SCr of 0.6 mg/dL). Lab Results   Component Value Date    BUN 23 (H) 03/14/2020       Lab Results   Component Value Date    WBC 8.37 03/14/2020                  Microbiology Results (last 7 days)       Procedure Component Value Units Date/Time    Blood culture [[id number]] Collected:  03/09/20 1622    Order Status:  Completed Specimen:  Blood from Line, PICC Right Basilic Updated:  03/14/20 1832     Blood Culture, Routine No growth after 5 days.    Narrative:       Draw from picc    Blood culture [[id number]]     Order Status:  Sent Specimen:  Blood     Blood culture [[id number]] Collected:  03/10/20 1155    Order Status:  Completed Specimen:  Blood from Line, PICC Right Basilic Updated:  03/14/20 1432     Blood Culture, Routine No Growth to date      No Growth to date      No Growth to date      No Growth to date      No Growth to date    Narrative:       Draw from picc    Blood culture [[id number]] Collected:  03/10/20 0420    Order Status:  Completed Specimen:  Blood from Line, PICC Right Brachial Updated:  03/14/20 0632     Blood " Culture, Routine No Growth to date      No Growth to date      No Growth to date      No Growth to date      No Growth to date    Narrative:       Unit draw per picc line    MRSA Screen by PCR [[id number]] Collected:  03/12/20 0330    Order Status:  Completed Specimen:  Nasopharyngeal Swab from Nasal Updated:  03/13/20 0148     MRSA SCREEN BY PCR Negative    Blood culture [[id number]] Collected:  03/07/20 0656    Order Status:  Completed Specimen:  Blood Updated:  03/12/20 1032     Blood Culture, Routine No growth after 5 days.    Blood culture [[id number]] Collected:  03/05/20 0317    Order Status:  Completed Specimen:  Blood Updated:  03/10/20 0632     Blood Culture, Routine No growth after 5 days.    Blood culture [[id number]]  (Abnormal)  (Susceptibility) Collected:  03/06/20 0350    Order Status:  Completed Specimen:  Blood Updated:  03/09/20 0644     Blood Culture, Routine Gram stain aer bottle: Gram positive cocci      Results called to and read back by:Darius Olvera RN-[address] [place] [address];        03/07/2020  05:25 CJD      METHICILLIN RESISTANT STAPHYLOCOCCUS AUREUS  Known MRSA patient      Blood culture [[id number]] Collected:  03/04/20 0358    Order Status:  Completed Specimen:  Blood Updated:  03/09/20 0632     Blood Culture, Routine No growth after 5 days.           Vancomycin Trough:  23.6 mcg/mL collected 10.5 hours after Dose #12     Assessment:    Renal function is: stable    Vancomycin trough is above goal range.    Plan:    Will change vancomycin to 1500 mg IV every 16 hours.      Will obtain vancomycin trough after 3 more dose(s), prior to dose due 3/17 at 08:00      Pharmacy will continue to manage vancomycin therapy and adjust regimen as necessary.    Thank you for allowing us to participate in this patient's care.     Pipo Earl 3/15/2020 4:38 AM  Department of Pharmacy  Ext [phone]

## 2020-03-15 NOTE — PLAN OF CARE
Problem: Adult Inpatient Plan of Care  Goal: Optimal Comfort and Wellbeing  Outcome: Ongoing, Progressing     Problem: Fall Injury Risk  Goal: Absence of Fall and Fall-Related Injury  Outcome: Ongoing, Progressing     Problem: Wound  Goal: Optimal Wound Healing  Outcome: Ongoing, Progressing

## 2020-03-15 NOTE — SUBJECTIVE & OBJECTIVE
Interval History:  No overnight events reported.  Denies overnight fever or chills.  Had low-grade fever of 100.6° F on 03/13; left arm midline since removed.  No erythema, swelling or pain reported.  Denies chest pain, shortness of breath or diarrhea.      Objective:     Vital Signs (Most Recent):  Temp: 98.5 °F (36.9 °C) (03/15/20 1705)  Pulse: 93 (03/15/20 1705)  Resp: 18 (03/15/20 1705)  BP: 112/66 (03/15/20 1705)  SpO2: 99 % (03/15/20 1705) Vital Signs (24h Range):  Temp:  [98 °F (36.7 °C)-99.2 °F (37.3 °C)] 98.5 °F (36.9 °C)  Pulse:  [81-97] 93  Resp:  [16-20] 18  SpO2:  [91 %-100 %] 99 %  BP: ()/(61-75) 112/66     Weight: 80.7 kg (177 lb 14.6 oz)  Body mass index is 27.05 kg/m².    Intake/Output Summary (Last 24 hours) at 3/15/2020 1713  Last data filed at 3/14/2020 1800  Gross per 24 hour   Intake 510 ml   Output --   Net 510 ml      Physical Exam   Constitutional: She is oriented to person, place, and time. She appears well-developed and well-nourished. No distress.   HENT:   Head: Normocephalic and atraumatic.   Eyes: Conjunctivae are normal. No scleral icterus.   Neck: Neck supple. No thyromegaly present.   Cardiovascular: Normal rate and regular rhythm.   Murmur heard.  Pulmonary/Chest: Effort normal and breath sounds normal. No respiratory distress.   Abdominal: Soft. Bowel sounds are normal. She exhibits no distension. There is no tenderness.   Musculoskeletal: She exhibits no edema or deformity.   Neurological: She is alert and oriented to person, place, and time. She has normal strength. No sensory deficit.   Skin: Skin is warm and dry. Capillary refill takes less than 2 seconds. No erythema.   Right arm PICC line noted    Psychiatric: She has a normal mood and affect. Her behavior is normal.   Nursing note and vitals reviewed.      Significant Labs:   CBC:   Recent Labs   Lab 03/14/20  0500   WBC 8.37   HGB 8.0*   HCT 24.7*   *     CMP:   Recent Labs   Lab 03/14/20  0500      K  4.2      CO2 27   GLU 92   BUN 23*   CREATININE 0.6   CALCIUM 8.9   PROT 8.0   ALBUMIN 2.7*   BILITOT 1.5*   ALKPHOS 97   AST 36   ALT 50*   ANIONGAP 7*   EGFRNONAA >60.0       Significant Imaging: I have reviewed all pertinent imaging results/findings within the past 24 hours.   Procedure Component Value Units Date/Time   X-Ray Chest PA And Lateral [[id number]] Resulted: 03/14/20 1732   Order Status: Completed Updated: 03/14/20 1735   Narrative:     EXAMINATION:  XR CHEST PA AND LATERAL    CLINICAL HISTORY:  fever;    COMPARISON:  03/06/2020 chest radiograph and 03/03/2020 chest CT    FINDINGS:  Right PICC is in stable position.  Several ill-defined pulmonary nodules are present within both lungs, correlating to recent chest CT findings.  Small linear focus within left mid lung likely reflects atelectasis or scarring.  No pleural fluid is evident.  No pneumothorax.   Impression:       Nodular opacities within both lungs correlate with recent chest CT findings, at which time septic emboli were suspected.      Electronically signed by: Valeriano Whittaker MD  Date: 03/14/2020  Time: 17:32

## 2020-03-16 LAB
ALBUMIN SERPL BCP-MCNC: 2.8 G/DL (ref 3.5–5.2)
ALP SERPL-CCNC: 94 U/L (ref 55–135)
ALT SERPL W/O P-5'-P-CCNC: 63 U/L (ref 10–44)
ANION GAP SERPL CALC-SCNC: 7 MMOL/L (ref 8–16)
AST SERPL-CCNC: 44 U/L (ref 10–40)
BASOPHILS # BLD AUTO: 0.09 K/UL (ref 0–0.2)
BASOPHILS NFR BLD: 1.3 % (ref 0–1.9)
BILIRUB SERPL-MCNC: 1.3 MG/DL (ref 0.1–1)
BUN SERPL-MCNC: 18 MG/DL (ref 6–20)
CALCIUM SERPL-MCNC: 9 MG/DL (ref 8.7–10.5)
CHLORIDE SERPL-SCNC: 103 MMOL/L (ref 95–110)
CO2 SERPL-SCNC: 27 MMOL/L (ref 23–29)
CREAT SERPL-MCNC: 0.7 MG/DL (ref 0.5–1.4)
DIFFERENTIAL METHOD: ABNORMAL
EOSINOPHIL # BLD AUTO: 0.2 K/UL (ref 0–0.5)
EOSINOPHIL NFR BLD: 2.2 % (ref 0–8)
ERYTHROCYTE [DISTWIDTH] IN BLOOD BY AUTOMATED COUNT: 15.2 % (ref 11.5–14.5)
EST. GFR  (AFRICAN AMERICAN): >60 ML/MIN/1.73 M^2
EST. GFR  (NON AFRICAN AMERICAN): >60 ML/MIN/1.73 M^2
GLUCOSE SERPL-MCNC: 93 MG/DL (ref 70–110)
HCT VFR BLD AUTO: 23.9 % (ref 37–48.5)
HGB BLD-MCNC: 7.7 G/DL (ref 12–16)
IMM GRANULOCYTES # BLD AUTO: 0.02 K/UL (ref 0–0.04)
IMM GRANULOCYTES NFR BLD AUTO: 0.3 % (ref 0–0.5)
LYMPHOCYTES # BLD AUTO: 2.6 K/UL (ref 1–4.8)
LYMPHOCYTES NFR BLD: 35.6 % (ref 18–48)
MCH RBC QN AUTO: 31.7 PG (ref 27–31)
MCHC RBC AUTO-ENTMCNC: 32.2 G/DL (ref 32–36)
MCV RBC AUTO: 98 FL (ref 82–98)
MONOCYTES # BLD AUTO: 0.9 K/UL (ref 0.3–1)
MONOCYTES NFR BLD: 11.9 % (ref 4–15)
NEUTROPHILS # BLD AUTO: 3.5 K/UL (ref 1.8–7.7)
NEUTROPHILS NFR BLD: 48.7 % (ref 38–73)
NRBC BLD-RTO: 0 /100 WBC
PLATELET # BLD AUTO: 275 K/UL (ref 150–350)
PMV BLD AUTO: 9.2 FL (ref 9.2–12.9)
POTASSIUM SERPL-SCNC: 4.2 MMOL/L (ref 3.5–5.1)
PROT SERPL-MCNC: 7.7 G/DL (ref 6–8.4)
RBC # BLD AUTO: 2.43 M/UL (ref 4–5.4)
SODIUM SERPL-SCNC: 137 MMOL/L (ref 136–145)
WBC # BLD AUTO: 7.2 K/UL (ref 3.9–12.7)

## 2020-03-16 PROCEDURE — 25000003 PHARM REV CODE 250: Performed by: INTERNAL MEDICINE

## 2020-03-16 PROCEDURE — 80053 COMPREHEN METABOLIC PANEL: CPT

## 2020-03-16 PROCEDURE — 63600175 PHARM REV CODE 636 W HCPCS: Performed by: FAMILY MEDICINE

## 2020-03-16 PROCEDURE — 12000002 HC ACUTE/MED SURGE SEMI-PRIVATE ROOM

## 2020-03-16 PROCEDURE — 85025 COMPLETE CBC W/AUTO DIFF WBC: CPT

## 2020-03-16 RX ADMIN — ACETAMINOPHEN 650 MG: 325 TABLET ORAL at 08:03

## 2020-03-16 RX ADMIN — VANCOMYCIN HYDROCHLORIDE 1500 MG: 1 INJECTION, POWDER, LYOPHILIZED, FOR SOLUTION INTRAVENOUS at 04:03

## 2020-03-16 RX ADMIN — VANCOMYCIN HYDROCHLORIDE 1500 MG: 1 INJECTION, POWDER, LYOPHILIZED, FOR SOLUTION INTRAVENOUS at 12:03

## 2020-03-16 RX ADMIN — METHADONE HYDROCHLORIDE 30 MG: 10 TABLET ORAL at 08:03

## 2020-03-16 NOTE — SUBJECTIVE & OBJECTIVE
Interval History:  No acute overnight events reported.  No overnight fevers/chills.  Denies chest pain, shortness of breath, lightheadedness/dizziness or nausea/vomiting. Awaiting placement       Objective:     Vital Signs (Most Recent):  Temp: 98.6 °F (37 °C) (03/17/20 1100)  Pulse: 80 (03/17/20 1100)  Resp: 18 (03/17/20 1100)  BP: 106/70 (03/17/20 1100)  SpO2: 99 % (03/17/20 1100) Vital Signs (24h Range):  Temp:  [98.4 °F (36.9 °C)-99.7 °F (37.6 °C)] 98.6 °F (37 °C)  Pulse:  [80-93] 80  Resp:  [16-20] 18  SpO2:  [98 %-99 %] 99 %  BP: ()/(57-71) 106/70     Weight: 80.7 kg (177 lb 14.6 oz)  Body mass index is 27.05 kg/m².    Intake/Output Summary (Last 24 hours) at 3/17/2020 1358  Last data filed at 3/17/2020 1300  Gross per 24 hour   Intake 2145 ml   Output --   Net 2145 ml      Physical Exam   Constitutional: She is oriented to person, place, and time. She appears well-developed and well-nourished. No distress.   HENT:   Head: Normocephalic and atraumatic.   Eyes: Conjunctivae are normal. No scleral icterus.   Neck: Neck supple. No thyromegaly present.   Cardiovascular: Normal rate and regular rhythm.   Murmur heard.  Pulmonary/Chest: Effort normal and breath sounds normal. No respiratory distress.   Abdominal: Soft. Bowel sounds are normal. She exhibits no distension. There is no tenderness.   Musculoskeletal: She exhibits no edema or deformity.   Neurological: She is alert and oriented to person, place, and time. She has normal strength. No sensory deficit.   Skin: Skin is warm and dry. Capillary refill takes less than 2 seconds. No erythema.   Right arm PICC line noted. IV track marks noted    Psychiatric: She has a normal mood and affect. Her behavior is normal.   Nursing note and vitals reviewed.      Significant Labs:   CBC:   Recent Labs   Lab 03/16/20  0355   WBC 7.20   HGB 7.7*   HCT 23.9*        CMP:   Recent Labs   Lab 03/16/20  0355      K 4.2      CO2 27   GLU 93   BUN 18    CREATININE 0.7   CALCIUM 9.0   PROT 7.7   ALBUMIN 2.8*   BILITOT 1.3*   ALKPHOS 94   AST 44*   ALT 63*   ANIONGAP 7*   EGFRNONAA >60.0       Microbiology Results (last 7 days)     Procedure Component Value Units Date/Time    Blood culture [[id number]] Collected:  03/15/20 0505    Order Status:  Completed Specimen:  Blood Updated:  03/17/20 0632     Blood Culture, Routine No Growth to date      No Growth to date      No Growth to date    Narrative:       Collection has been rescheduled by KS3 at 03/15/2020 04:18 Reason: pt   wants to see if RN will draw from PICC  Collection has been rescheduled by KS3 at 03/15/2020 04:18 Reason: pt   wants to see if RN will draw from PICC    Blood culture [[id number]] Collected:  03/10/20 1155    Order Status:  Completed Specimen:  Blood from Line, PICC Right Basilic Updated:  03/15/20 1432     Blood Culture, Routine No growth after 5 days.    Narrative:       Draw from picc    Blood culture [[id number]] Collected:  03/10/20 0420    Order Status:  Completed Specimen:  Blood from Line, PICC Right Brachial Updated:  03/15/20 0632     Blood Culture, Routine No growth after 5 days.    Narrative:       Unit draw per picc line    Blood culture [[id number]] Collected:  03/09/20 1622    Order Status:  Completed Specimen:  Blood from Line, PICC Right Basilic Updated:  03/14/20 1832     Blood Culture, Routine No growth after 5 days.    Narrative:       Draw from picc    MRSA Screen by PCR [[id number]] Collected:  03/12/20 0330    Order Status:  Completed Specimen:  Nasopharyngeal Swab from Nasal Updated:  03/13/20 0148     MRSA SCREEN BY PCR Negative    Blood culture [[id number]] Collected:  03/07/20 0656    Order Status:  Completed Specimen:  Blood Updated:  03/12/20 1032     Blood Culture, Routine No growth after 5 days.

## 2020-03-16 NOTE — PROGRESS NOTES
"[facility]  Adult Nutrition   Progress Note (Follow-Up)    SUMMARY     Recommendations  Recommendation/Intervention: 1. Continue current diet and supplements as tolerated 2.  to assist with meal choices daily  Goals: 1. Pt to meet at least 75% estimated needs via PO diet/supplements   Nutrition Goal Status: progressing towards goal  Communication of RD Recs: discussed on rounds    Dietitian Rounds Brief    Pt seen for f/u. Tolerating diet; intake good. Consuming >75% meals per pt along with 100% Ensure supplement. No N/V noted. LBM 3/14. No needs voiced at this time.     Malnutrition Assessment  Malnutrition Type: social/environmental circumstances      Reason for Assessment  Reason For Assessment: RD follow-up    Nutrition Risk Screen  Nutrition Risk Screen: no indicators present  [REMOVED]      Wound 20 0400 medial Buttocks-Wound Image: Images linked  MST Score: 0  Have you recently lost weight without trying?: No  Weight loss score: 0  Have you been eating poorly because of a decreased appetite?: No  Appetite score: 0       Nutrition/Diet History  Patient Reported Diet/Restrictions/Preferences: general  Typical Food/Fluid Intake: fair   Spiritual, Cultural Beliefs, Protestant Practices, Values that Affect Care: no  Food Allergies: NKFA  Factors Affecting Nutritional Intake: None identified at this time    Anthropometrics  Temp: 98.3 °F (36.8 °C)  Height Method: Stated  Height: 5' 8" (172.7 cm)  Height (inches): 68 in  Weight Method: Bed Scale  Weight: 80.7 kg (177 lb 14.6 oz)  Weight (lb): 177.91 lb  Ideal Body Weight (IBW), Female: 140 lb  % Ideal Body Weight, Female (lb): 133.07 %  BMI (Calculated): 27.1  BMI Grade: 25 - 29.9 - overweight  Weight Loss: unintentional  Usual Body Weight (UBW), k.4 kg(UBW = about 190 lbs per patient )  % Usual Body Weight: 93.6  % Weight Change From Usual Weight: -6.6 %       Weight History:  Wt Readings from Last 10 Encounters:   20 80.7 kg " (177 lb 14.6 oz)   08/19/19 87.5 kg (193 lb)       Lab/Procedures/Meds: Pertinent Labs Reviewed  Clinical Chemistry:  Recent Labs   Lab 03/12/20  0349  03/16/20  0355   *   < > 137   K 4.2   < > 4.2      < > 103   CO2 26   < > 27   GLU 88   < > 93   BUN 19   < > 18   CREATININE 0.6   < > 0.7   CALCIUM 8.8   < > 9.0   PROT 8.4   < > 7.7   ALBUMIN 2.8*   < > 2.8*   BILITOT 1.8*   < > 1.3*   ALKPHOS 107   < > 94   AST 35   < > 44*   ALT 46*   < > 63*   ANIONGAP 6*   < > 7*   ESTGFRAFRICA >60.0   < > >60.0   EGFRNONAA >60.0   < > >60.0   MG 1.9  --   --     < > = values in this interval not displayed.     CBC:   Recent Labs   Lab 03/16/20  0355   WBC 7.20   RBC 2.43*   HGB 7.7*   HCT 23.9*      MCV 98   MCH 31.7*   MCHC 32.2     Inflammatory Labs:  Recent Labs   Lab 03/10/20  0436 03/15/20  0505   CRP 2.52*  2.52* 1.87*     Medications: Pertinent Medications reviewed  Scheduled Meds:   enoxaparin  40 mg Subcutaneous Daily    methadone  30 mg Oral Daily    vancomycin (VANCOCIN) IVPB  1,500 mg Intravenous Q16H     Continuous Infusions:  PRN Meds:.acetaminophen, calcium chloride IVPB, calcium chloride IVPB, calcium chloride IVPB, ibuprofen, lorazepam, magnesium oxide, magnesium sulfate IVPB, magnesium sulfate IVPB, magnesium sulfate IVPB, magnesium sulfate IVPB, ondansetron, oxyCODONE, potassium chloride in water, potassium chloride in water, potassium chloride in water, potassium chloride in water, potassium chloride, potassium chloride, potassium chloride, potassium chloride, DIPH,PERTUS (ADACEL),TETANUS PF VAC (ADULT), Pharmacy to dose Vancomycin consult **AND** vancomycin - pharmacy to dose    Estimated/Assessed Needs  Weight Used For Calorie Calculations: 80.7 kg (177 lb 14.6 oz)  Energy Calorie Requirements (kcal): 3684-1561 kcals/day (25-30 kcals/kg)  Energy Need Method: Kcal/kg  Protein Requirements:  g/day (1.2-1.5 g/kg)  Weight Used For Protein Calculations: 80.7 kg (177 lb 14.6 oz)      Estimated Fluid Requirement Method: RDA Method  RDA Method (mL): 2018       Nutrition Prescription Ordered  Current Diet Order: regular   Oral Nutrition Supplement: Ensure Enlive TID    Evaluation of Received Nutrient/Fluid Intake  Energy Calories Required: meeting needs  Protein Required: meeting needs  Fluid Required: meeting needs  Tolerance: tolerating     Intake/Output Summary (Last 24 hours) at 3/16/2020 0930  Last data filed at 3/16/2020 0400  Gross per 24 hour   Intake 2590 ml   Output --   Net 2590 ml        % Meal Intake: 75 - 100 %    Nutrition Risk  Level of Risk/Frequency of Follow-up: moderate     Monitor and Evaluation  Food and Nutrient Intake: food and beverage intake, energy intake  Food and Nutrient Adminstration: diet order  Physical Activity and Function: nutrition-related ADLs and IADLs  Anthropometric Measurements: weight change, weight  Biochemical Data, Medical Tests and Procedures: electrolyte and renal panel, gastrointestinal profile, glucose/endocrine profile  Nutrition-Focused Physical Findings: overall appearance     Nutrition Follow-Up  RD Follow-up?: Yes    Adri Wiley RD 03/16/2020 9:30 AM

## 2020-03-16 NOTE — PROGRESS NOTES
"Progress Note  Infectious Disease    Reason for Consult:  Probable endocarditis    02/29/2020.  Consult note. Soraya Gee is a  appearing 34 y.o. female with past medical history of hepatitis-C, IV drug use, in present for 3 years, released in March 2019, that is when she resumed IV drugs.  She comes in complaining of shaking chills, fever, myalgia, malaise, generalized weakness, 3 days duration after injecting some "bad drugs".  Initially she thought she had cotton fever but the symptoms persisted so she came to ER.  Patient also complains of shortness of breath, she was tachycardic and tachypneic on admission.  She has noticed swelling throughout.  Her albumin is low  Patient is a IV drug abuse and uses heroin twice a day, for a total daily dose of 1 g.  No sick contacts.  She is admitted in ICU for closer observation to make sure she does not withdrawal.  Patient is tachycardic, tachypneic, hurting all over, her history is limited by although this    03/01/2020.  Withdrawal symptoms are improved with Precedex drip; although she still feels like she is breathing fast and she is hurting all over.  She spiked fever of 101.3.  Blood cultures came positive for GPC.  No urinary complaints, no new skin or joint complaint, no cough or phlegm.  03/02/2020.  She spiked another fever of 102.3.  All cultures are turning up to be MRSA  CRP improved from 20/5 down to 13.  WBC is still elevated.  She gets confused on and off but then she interacts appropriately.    3/3:  Chart reviewed and discussed with Dr. Mccormack.  Persistently positive blood cultures with MRSA, vancomycin EDDIE 1.  Persistently hectic fever and septic appearance, improve hyperbilirubinemia, right upper quadrant tenderness, generalized aches and pains, sedated a bit on Precedex and methadone, neurologically intact.  3/4: still febrile through late last night. 3/2 cultures negative, but 3/3 cultures have GPC. CT chest/abd/pelvis reviewed and septic pulmonary " "emboli are present, but no spleen, muscle or paraspinous lesions noted.  She is currently up in the chair, off Precedex.  We discussed her diagnosis and she is willing to receive the treatment.  Does not have much appetite but she will drink the in [place].  She still hurts everywhere and does have some pleurisy.  3/5: temperature improving. Blood cultures from yesterday are negative so far. She looks much improved, clearer mentally, still very uncomfortable, somewhat anxious. Not remembering information given yesterday. Expresses desire to "get off " the drugs. Still has no appetite. Drinking a little ensure. No diarrhea. Still splinting from pleurisy. Discussed with her about the NAVA. Length of therapy.   3/6: afebrile. Blood cultures neg x 2 days. Very anxious/hyperventilating, about NAVA. D/w anesthesia. Reassured her. No nausea, vomiting. Ok for picc line.  3/7: afebrile. VSS. 1 blood culture from yesterday drawn peripherally "left wrist" has GPC. I cannot confirm that it wasn't drawn from midline but site is different. NAVA demonstrated TV vegetation with flail leaflet and TR. No change in vision, no new MSK pain, or skin lesion. Still has some pleural discomfort associated with septic emboli.   3/8: 3/6 blood culture identified as Staph aureus. She went outside but now knows she is restricted. She has showered and put on make up. She is eating well. No diarrhea. Mild LE edema which she states is not new. No antibiotic intolerance. No change in vision, new MSK pain, new skin lesion  3/9: blood cultures not drawn until this afternoon. She feels well. Eating well. Would like to walk more. LTAC eval in progress.   3/10:  In great spirits, recent blood cultures negative.  Was visited by representative from the LTAC of her choice.  Changed over to vancomycin again.  No pleuritic complaints, appetite is good, no diarrhea, no problems with PICC line today.  No change in vision, no new musculoskeletal pain or skin " "lesions.    03/13/2020 Tmax 99.4.  afebrile now.  No chills.  She has the same chest discomfort, 2/10, worse when breathing in deep, better with rest, no associated cough or phlegm.  She admits that she feels way better than a week ago.  Good appetite.  No nausea no vomiting no diarrhea.      03/14/2020.  Patient feels well.  She states she gets some temps on and off She had a temperature of 100.6°.  WBC is still low at 8.37.  03/15/2020.  Patient had no more fevers.  She continues to tolerate vancomycin well.  She has no new complaints.  She workup from yesterday looks negative so far.  Left arm midline has been discontinued.  Discussed with hospitalist.  Discussed with patient.   03/16/2020.  Standing up in the room.  She tries to keep busy her with reading make since etc.  T-max 98.7°.  No new complaints.      Antibiotics (From admission, onward)    Start     Stop Route Frequency Ordered    03/15/20 0800  vancomycin (VANCOCIN) 1,500 mg in dextrose 5 % 500 mL IVPB     Note to Pharmacy:  Ht: 5' 8" (1.727 m)  Wt: 80.7 kg (177 lb 14.6 oz)  Estimated Creatinine Clearance: 148.6 mL/min    -- IV Every 16 hours 03/15/20 0434    03/10/20 1202  vancomycin - pharmacy to dose  (vancomycin IVPB)      -- IV pharmacy to manage frequency 03/10/20 1102        Antifungals (From admission, onward)    None        Antivirals (From admission, onward)    None            EXAM & DIAGNOSTICS REVIEWED:   Vitals:     Temp:  [98 °F (36.7 °C)-98.7 °F (37.1 °C)]   Temp: 98.3 °F (36.8 °C) (03/16/20 0818)  Pulse: 80 (03/16/20 0818)  Resp: 18 (03/16/20 0818)  BP: 121/79 (03/16/20 0818)  SpO2: 96 % (03/16/20 0818)    Intake/Output Summary (Last 24 hours) at 3/16/2020 1206  Last data filed at 3/16/2020 1000  Gross per 24 hour   Intake 2590 ml   Output --   Net 2590 ml     Vitals:    03/16/20 0818   BP: 121/79   Pulse: 80   Resp: 18   Temp: 98.3 °F (36.8 °C)         General:  Alert cooperative , looks and feels great   Eyes:  anicteric, PERRL, EOMI, " no scleral conjunctival lesions  ENT:  No ulcers, exudates, thrush, nares patent, edentulous/dentures.     Neck:  Supple,   Lungs: clear  Heart:  Regular,  soft 1/6 systolic murmur  Abd:  Soft, ND, normal BS, nontender  :  Voiding without difficulty  Musc:  No focal joint swelling, synovitis, myositis, moves all extremities  Skin:  Marks from prior IV use.    without septic emboli, no subungual petechia   Wound:   Neuro:  speech fluent, face symmetric, moves all extremities, no focal weakness.   Psych:  Alert, pleasant, motivated  cooperative   Lymphatic:        Extrem:  No palpable edema  , phlebitis, cellulitis, warm and well perfused  VAD:   picc right  Isolation:  Contact    Lines/Tubes/Drains:  Peripheral IV    General Labs reviewed:  Recent Labs   Lab 03/12/20 0349 03/14/20  0500 03/16/20  0355   WBC 11.24 8.37 7.20   HGB 8.7* 8.0* 7.7*   HCT 26.6* 24.7* 23.9*   * 383* 275       Recent Labs   Lab 03/12/20 0349 03/14/20  0500 03/16/20  0355   * 136 137   K 4.2 4.2 4.2    102 103   CO2 26 27 27   BUN 19 23* 18   CREATININE 0.6 0.6 0.7   CALCIUM 8.8 8.9 9.0   PROT 8.4 8.0 7.7   BILITOT 1.8* 1.5* 1.3*   ALKPHOS 107 97 94   ALT 46* 50* 63*   AST 35 36 44*       Recent Labs   Lab 03/10/20  0436 03/15/20  0505   CRP 2.52*  2.52* 1.87*             Micro:  Microbiology Results (last 7 days)     Procedure Component Value Units Date/Time    Blood culture [[id number]] Collected:  03/15/20 0505    Order Status:  Completed Specimen:  Blood Updated:  03/16/20 0632     Blood Culture, Routine No Growth to date      No Growth to date    Narrative:       Collection has been rescheduled by KS3 at 03/15/2020 04:18 Reason: pt   wants to see if RN will draw from PICC  Collection has been rescheduled by KS3 at 03/15/2020 04:18 Reason: pt   wants to see if RN will draw from PICC    Blood culture [[id number]] Collected:  03/10/20 1155    Order Status:  Completed Specimen:  Blood from Line, PICC Right Basilic  Updated:  03/15/20 1432     Blood Culture, Routine No growth after 5 days.    Narrative:       Draw from picc    Blood culture [[id number]] Collected:  03/10/20 0420    Order Status:  Completed Specimen:  Blood from Line, PICC Right Brachial Updated:  03/15/20 0632     Blood Culture, Routine No growth after 5 days.    Narrative:       Unit draw per picc line    Blood culture [[id number]] Collected:  03/09/20 1622    Order Status:  Completed Specimen:  Blood from Line, PICC Right Basilic Updated:  03/14/20 1832     Blood Culture, Routine No growth after 5 days.    Narrative:       Draw from picc    MRSA Screen by PCR [[id number]] Collected:  03/12/20 0330    Order Status:  Completed Specimen:  Nasopharyngeal Swab from Nasal Updated:  03/13/20 0148     MRSA SCREEN BY PCR Negative    Blood culture [[id number]] Collected:  03/07/20 0656    Order Status:  Completed Specimen:  Blood Updated:  03/12/20 1032     Blood Culture, Routine No growth after 5 days.    Blood culture [[id number]] Collected:  03/05/20 0317    Order Status:  Completed Specimen:  Blood Updated:  03/10/20 0632     Blood Culture, Routine No growth after 5 days.        Imaging Reviewed:  Chest x-rays    Chest x-ray 0314 Nodular opacities within both lungs correlate with recent chest CT findings, at which time septic emboli were suspected.    CXR Mild central hilar bronchovascular crowding, possibly secondary to low lung volumes and atelectasis or very mild edema, or atypical infection/bronchitis as above    US1.  Sludge seen in the gallbladder, without findings to specifically suggest acute cholecystitis (noting borderline gallbladder wall thickening).  2.  Rounded echogenic focus in the right lobe of the liver, with imaging features of an hepatic hemangioma, consider ultrasound follow-up in 6 months to document stability.  3.  Numerous small periportal lymph nodes, likely reactive/inflammatory in nature.  Consider correlation with liver function tests and  hepatitis serologies.    CT chest/abd/pelvis 3/3  Bilateral diffuse nodular predominantly pleural based opacities without cavitation.  Findings are most consistent with septic emboli.  Multifocal pneumonia could not be excluded.  There is bibasilar airspace disease which may represent atelectasis or infiltrates with small bilateral pleural effusions  Mild hepatosplenomegaly.  The hyperechoic mass in the right lobe of the liver is not seen on CT and most likely represents hemangioma  No acute abnormality within the abdomen or pelvis    Cardiology:    Transthoracic echo 3/1  Left Ventricle Normal ejection fraction at 60%. Normal left ventricular cavity size. Normal wall thickness observed. Normal left ventricular diastolic function. Normal left atrial pressure. No wall motion abnormalities.   Right Ventricle Normal cavity size, wall thickness and systolic function. Wall motion normal.   Left Atrium The left atrium is normal.   Right Atrium There is normal right atrial size.   Aortic Valve The aortic valve appears structurally normal. There is no stenosis. No regurgitation. There is normal leaflet mobility.The LVOT diameter is 2.02 cm.   Mitral Valve The mitral valve appears structurally normal. There is normal leaflet mobility. No regurgitation.   Tricuspid Valve The tricuspid valve appears structurally normal. No stenosis. Trace regurgitation. There is normal leaflet mobility. The estimated PA systolic pressure is 26 mmHg.   Pulmonic Valve The pulmonic valve was not well visualized. No stenosis. No regurgitation.   IVC/SVC Normal central venous pressure (3 mm Hg).   Ascending Aorta The aortic root and ascending aorta are normal in size.   Pericardium No pericardial effusion.     NAVA 3/6:  1. Aortic valve is structurally normal with good leaflet excursion. No significant regurgitation.   2. Mitral valve is structurally normal with good leaflet excursion. Trace regurgitation.   3. Tricuspid valve: There appears to be a  small vegetation (0.9 X 0.4 cm) attached to the base of the septal leaflet. There also appears to be prolapse and flail of the leaflets causing severe eccentric tricuspid regurgitation.   4. Pulmonary valve is structurally normal with good leaflet excursion. No significant regurgitation.  5. Overall LVEF and RVEF appears normal.   6. No obvious shunt across the interatrial septum by color flow Doppler and agitated saline.     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 Septic pulmonary emboli  Flail valve leaflet with tricuspid regurg  Elevated markers of inflammation including procal, CRP, ESR. improving  Recent temp on 03/13/2020, 100.6.  Workup negative so far.     TELLY. resolved   Epigastric right upper quadrant discomfort and generalized abdominal discomfort.-resolved  Jaundice Transaminitis, heavy opiate use, H/o Hep C, untreated, ammonia normal:  Bilirubin improved  IVDU. heroin  Anemia;   Protein malnutrition.  Hypoalbuminemia =  2.6; anxiety  CRP trend 14.9--10.18--2.52--1.87  Procalcitonin 4.71--0.08.    Recommendations:  Patient has been treated with appropriate antibiotics since 02/29/2020.  Initially with vancomycin and Dapto;   then with daptomycin and Teflaro.  Converted  back to vancomycin  On 03/10   End date of IV antibiotics: 4/4, =/-followed by po doxy for lungs x 2 weeks?   LTAC placment in progress  Workup for the fever of 3/13 has not shown anything.  Continue to monitor.  Will follow from periphery

## 2020-03-16 NOTE — PROGRESS NOTES
[facility] Medicine  Progress Note    Patient Name: Soraya Gee  MRN: [id number]  Patient Class: IP- Inpatient   Admission Date: 2/29/2020  Length of Stay: 16 days  Attending Physician: Sumeet Rodriguez MD  Primary Care Provider: Jus Griggs Iii, MD        Subjective:     Principal Problem:Acute bacterial endocarditis    Interval History:  No acute overnight events reported.  No overnight fevers/chills.  Denies chest pain, shortness of breath, lightheadedness/dizziness or nausea/vomiting.      Objective:     Vital Signs (Most Recent):  Temp: 98.3 °F (36.8 °C) (03/16/20 0818)  Pulse: 80 (03/16/20 0818)  Resp: 18 (03/16/20 0818)  BP: 121/79 (03/16/20 0818)  SpO2: 96 % (03/16/20 0818) Vital Signs (24h Range):  Temp:  [98 °F (36.7 °C)-98.7 °F (37.1 °C)] 98.3 °F (36.8 °C)  Pulse:  [] 80  Resp:  [16-18] 18  SpO2:  [96 %-99 %] 96 %  BP: ()/(61-79) 121/79     Weight: 80.7 kg (177 lb 14.6 oz)  Body mass index is 27.05 kg/m².    Intake/Output Summary (Last 24 hours) at 3/16/2020 1107  Last data filed at 3/16/2020 1000  Gross per 24 hour   Intake 3070 ml   Output --   Net 3070 ml      Physical Exam   Constitutional: She is oriented to person, place, and time. She appears well-developed and well-nourished. No distress.   HENT:   Head: Normocephalic and atraumatic.   Eyes: Conjunctivae are normal. No scleral icterus.   Neck: Neck supple. No thyromegaly present.   Cardiovascular: Normal rate and regular rhythm.   Murmur heard.  Pulmonary/Chest: Effort normal and breath sounds normal. No respiratory distress.   Abdominal: Soft. Bowel sounds are normal. She exhibits no distension. There is no tenderness.   Musculoskeletal: She exhibits no edema or deformity.   Neurological: She is alert and oriented to person, place, and time. She has normal strength. No sensory deficit.   Skin: Skin is warm and dry. Capillary refill takes less than 2 seconds. No erythema.   Right arm PICC line noted. IV  track marks noted    Psychiatric: She has a normal mood and affect. Her behavior is normal.   Nursing note and vitals reviewed.      Significant Labs:   CBC:   Recent Labs   Lab 03/16/20 0355   WBC 7.20   HGB 7.7*   HCT 23.9*        CMP:   Recent Labs   Lab 03/16/20 0355      K 4.2      CO2 27   GLU 93   BUN 18   CREATININE 0.7   CALCIUM 9.0   PROT 7.7   ALBUMIN 2.8*   BILITOT 1.3*   ALKPHOS 94   AST 44*   ALT 63*   ANIONGAP 7*   EGFRNONAA >60.0       Microbiology Results (last 7 days)     Procedure Component Value Units Date/Time    Blood culture [[id number]] Collected:  03/15/20 0505    Order Status:  Completed Specimen:  Blood Updated:  03/16/20 0632     Blood Culture, Routine No Growth to date      No Growth to date    Narrative:       Collection has been rescheduled by KS3 at 03/15/2020 04:18 Reason: pt   wants to see if RN will draw from PICC  Collection has been rescheduled by KS3 at 03/15/2020 04:18 Reason: pt   wants to see if RN will draw from PICC    Blood culture [[id number]] Collected:  03/10/20 1155    Order Status:  Completed Specimen:  Blood from Line, PICC Right Basilic Updated:  03/15/20 1432     Blood Culture, Routine No growth after 5 days.    Narrative:       Draw from picc    Blood culture [[id number]] Collected:  03/10/20 0420    Order Status:  Completed Specimen:  Blood from Line, PICC Right Brachial Updated:  03/15/20 0632     Blood Culture, Routine No growth after 5 days.    Narrative:       Unit draw per picc line    Blood culture [[id number]] Collected:  03/09/20 1622    Order Status:  Completed Specimen:  Blood from Line, PICC Right Basilic Updated:  03/14/20 1832     Blood Culture, Routine No growth after 5 days.    Narrative:       Draw from picc    MRSA Screen by PCR [[id number]] Collected:  03/12/20 0330    Order Status:  Completed Specimen:  Nasopharyngeal Swab from Nasal Updated:  03/13/20 0148     MRSA SCREEN BY PCR Negative    Blood culture [[id number]] Collected:   03/07/20 0656    Order Status:  Completed Specimen:  Blood Updated:  03/12/20 1032     Blood Culture, Routine No growth after 5 days.    Blood culture [[id number]] Collected:  03/05/20 0317    Order Status:  Completed Specimen:  Blood Updated:  03/10/20 0632     Blood Culture, Routine No growth after 5 days.               Microbiology Results (last 7 days)     Procedure Component Value Units Date/Time    Blood culture [[id number]] Collected:  03/15/20 0505    Order Status:  Completed Specimen:  Blood Updated:  03/16/20 0632     Blood Culture, Routine No Growth to date      No Growth to date    Narrative:       Collection has been rescheduled by KS3 at 03/15/2020 04:18 Reason: pt   wants to see if RN will draw from PICC  Collection has been rescheduled by KS3 at 03/15/2020 04:18 Reason: pt   wants to see if RN will draw from PICC    Blood culture [[id number]] Collected:  03/10/20 1155    Order Status:  Completed Specimen:  Blood from Line, PICC Right Basilic Updated:  03/15/20 1432     Blood Culture, Routine No growth after 5 days.    Narrative:       Draw from picc    Blood culture [[id number]] Collected:  03/10/20 0420    Order Status:  Completed Specimen:  Blood from Line, PICC Right Brachial Updated:  03/15/20 0632     Blood Culture, Routine No growth after 5 days.    Narrative:       Unit draw per picc line    Blood culture [[id number]] Collected:  03/09/20 1622    Order Status:  Completed Specimen:  Blood from Line, PICC Right Basilic Updated:  03/14/20 1832     Blood Culture, Routine No growth after 5 days.    Narrative:       Draw from picc    MRSA Screen by PCR [[id number]] Collected:  03/12/20 0330    Order Status:  Completed Specimen:  Nasopharyngeal Swab from Nasal Updated:  03/13/20 0148     MRSA SCREEN BY PCR Negative    Blood culture [[id number]] Collected:  03/07/20 0656    Order Status:  Completed Specimen:  Blood Updated:  03/12/20 1032     Blood Culture, Routine No growth after 5 days.    Blood culture  [[id number]] Collected:  03/05/20 0317    Order Status:  Completed Specimen:  Blood Updated:  03/10/20 0632     Blood Culture, Routine No growth after 5 days.          Assessment/Plan:     Active Hospital Problems    Diagnosis  POA    *Acute bacterial endocarditis [I33.0]  Yes    Endocarditis of tricuspid valve [I36.8]  Yes    MRSA bacteremia [R78.81]  Yes    Anemia of chronic disease [D63.8]  Yes    IVDU (intravenous drug user) [F19.90]  Yes    Hepatitis C [B19.20]  Yes      Resolved Hospital Problems    Diagnosis Date Resolved POA    TELLY (acute kidney injury) [N17.9] 03/15/2020 Yes       Plan:  Continue IV vancomycin with dosing and monitoring as per pharmacy (stop date is 04/04/2020)  Monitor for fevers.  Appreciate ID input  Given history of IV drug abuse and need for long-term antibiotics she will need placement; case management working on the same  Continue methadone for opiate use disorder  Outpatient follow-up for hepatitis C      VTE Risk Mitigation (From admission, onward)         Ordered     enoxaparin injection 40 mg  Daily      02/29/20 1725     IP VTE HIGH RISK PATIENT  Once      02/29/20 1725                      Sumeet Rodriguez MD  Department of Hospital Medicine   [facility]

## 2020-03-17 LAB — VANCOMYCIN TROUGH SERPL-MCNC: 10.9 UG/ML (ref 10–22)

## 2020-03-17 PROCEDURE — 80202 ASSAY OF VANCOMYCIN: CPT

## 2020-03-17 PROCEDURE — 12000002 HC ACUTE/MED SURGE SEMI-PRIVATE ROOM

## 2020-03-17 PROCEDURE — 25000003 PHARM REV CODE 250: Performed by: INTERNAL MEDICINE

## 2020-03-17 PROCEDURE — 63600175 PHARM REV CODE 636 W HCPCS: Performed by: INTERNAL MEDICINE

## 2020-03-17 RX ADMIN — VANCOMYCIN HYDROCHLORIDE 1500 MG: 1 INJECTION, POWDER, LYOPHILIZED, FOR SOLUTION INTRAVENOUS at 08:03

## 2020-03-17 RX ADMIN — VANCOMYCIN HYDROCHLORIDE 1500 MG: 1 INJECTION, POWDER, LYOPHILIZED, FOR SOLUTION INTRAVENOUS at 07:03

## 2020-03-17 RX ADMIN — METHADONE HYDROCHLORIDE 30 MG: 10 TABLET ORAL at 08:03

## 2020-03-17 NOTE — PROGRESS NOTES
Chart reviewed  Patient has been doing great.  Her white count is markedly improved.  Procalcitonin was 0.08 on the 15th.  CRP is also improved.  1.8 on the 15th.  Platelets are improved.    She is by the end of treatment.  Will give  Dalvance 1.5g x 1 now and,    x 1.5 gram iv x1 in 7days.  As OP  By [employer]  I reyes Hwang

## 2020-03-17 NOTE — PROGRESS NOTES
[facility]  Progress Note    Patient Name: Soraya Gee  MRN: [id number]  Patient Class: IP- Inpatient   Admission Date: 2/29/2020  Length of Stay: 17 days  Attending Physician: Sumeet Rodriguez MD  Primary Care Provider: Jus Griggs Iii, MD        Subjective:     Principal Problem:Acute bacterial endocarditis    Interval History:  No acute overnight events reported.  No overnight fevers/chills.  Denies chest pain, shortness of breath, lightheadedness/dizziness or nausea/vomiting.      Objective:     Vital Signs (Most Recent):  Temp: 98.6 °F (37 °C) (03/17/20 1100)  Pulse: 80 (03/17/20 1100)  Resp: 18 (03/17/20 1100)  BP: 106/70 (03/17/20 1100)  SpO2: 99 % (03/17/20 1100) Vital Signs (24h Range):  Temp:  [98.4 °F (36.9 °C)-99.7 °F (37.6 °C)] 98.6 °F (37 °C)  Pulse:  [80-93] 80  Resp:  [16-20] 18  SpO2:  [98 %-99 %] 99 %  BP: ()/(57-71) 106/70     Weight: 80.7 kg (177 lb 14.6 oz)  Body mass index is 27.05 kg/m².    Intake/Output Summary (Last 24 hours) at 3/17/2020 1358  Last data filed at 3/17/2020 1300  Gross per 24 hour   Intake 2145 ml   Output --   Net 2145 ml      Physical Exam   Constitutional: She is oriented to person, place, and time. She appears well-developed and well-nourished. No distress.   HENT:   Head: Normocephalic and atraumatic.   Eyes: Conjunctivae are normal. No scleral icterus.   Neck: Neck supple. No thyromegaly present.   Cardiovascular: Normal rate and regular rhythm.   Murmur heard.  Pulmonary/Chest: Effort normal and breath sounds normal. No respiratory distress.   Abdominal: Soft. Bowel sounds are normal. She exhibits no distension. There is no tenderness.   Musculoskeletal: She exhibits no edema or deformity.   Neurological: She is alert and oriented to person, place, and time. She has normal strength. No sensory deficit.   Skin: Skin is warm and dry. Capillary refill takes less than 2 seconds. No erythema.   Right arm PICC line noted. IV track  marks noted    Psychiatric: She has a normal mood and affect. Her behavior is normal.   Nursing note and vitals reviewed.      Significant Labs:   CBC:   Recent Labs   Lab 03/16/20  0355   WBC 7.20   HGB 7.7*   HCT 23.9*        CMP:   Recent Labs   Lab 03/16/20  0355      K 4.2      CO2 27   GLU 93   BUN 18   CREATININE 0.7   CALCIUM 9.0   PROT 7.7   ALBUMIN 2.8*   BILITOT 1.3*   ALKPHOS 94   AST 44*   ALT 63*   ANIONGAP 7*   EGFRNONAA >60.0       Microbiology Results (last 7 days)     Procedure Component Value Units Date/Time    Blood culture [[id number]] Collected:  03/15/20 0505    Order Status:  Completed Specimen:  Blood Updated:  03/17/20 0632     Blood Culture, Routine No Growth to date      No Growth to date      No Growth to date    Narrative:       Collection has been rescheduled by KS3 at 03/15/2020 04:18 Reason: pt   wants to see if RN will draw from PICC  Collection has been rescheduled by KS3 at 03/15/2020 04:18 Reason: pt   wants to see if RN will draw from PICC    Blood culture [[id number]] Collected:  03/10/20 1155    Order Status:  Completed Specimen:  Blood from Line, PICC Right Basilic Updated:  03/15/20 1432     Blood Culture, Routine No growth after 5 days.    Narrative:       Draw from picc    Blood culture [[id number]] Collected:  03/10/20 0420    Order Status:  Completed Specimen:  Blood from Line, PICC Right Brachial Updated:  03/15/20 0632     Blood Culture, Routine No growth after 5 days.    Narrative:       Unit draw per picc line    Blood culture [[id number]] Collected:  03/09/20 1622    Order Status:  Completed Specimen:  Blood from Line, PICC Right Basilic Updated:  03/14/20 1832     Blood Culture, Routine No growth after 5 days.    Narrative:       Draw from picc    MRSA Screen by PCR [[id number]] Collected:  03/12/20 0330    Order Status:  Completed Specimen:  Nasopharyngeal Swab from Nasal Updated:  03/13/20 0148     MRSA SCREEN BY PCR Negative    Blood culture  [[id number]] Collected:  03/07/20 0656    Order Status:  Completed Specimen:  Blood Updated:  03/12/20 1032     Blood Culture, Routine No growth after 5 days.               Microbiology Results (last 7 days)     Procedure Component Value Units Date/Time    Blood culture [[id number]] Collected:  03/15/20 0505    Order Status:  Completed Specimen:  Blood Updated:  03/17/20 0632     Blood Culture, Routine No Growth to date      No Growth to date      No Growth to date    Narrative:       Collection has been rescheduled by KS3 at 03/15/2020 04:18 Reason: pt   wants to see if RN will draw from PICC  Collection has been rescheduled by KS3 at 03/15/2020 04:18 Reason: pt   wants to see if RN will draw from PICC    Blood culture [[id number]] Collected:  03/10/20 1155    Order Status:  Completed Specimen:  Blood from Line, PICC Right Basilic Updated:  03/15/20 1432     Blood Culture, Routine No growth after 5 days.    Narrative:       Draw from picc    Blood culture [[id number]] Collected:  03/10/20 0420    Order Status:  Completed Specimen:  Blood from Line, PICC Right Brachial Updated:  03/15/20 0632     Blood Culture, Routine No growth after 5 days.    Narrative:       Unit draw per picc line    Blood culture [[id number]] Collected:  03/09/20 1622    Order Status:  Completed Specimen:  Blood from Line, PICC Right Basilic Updated:  03/14/20 1832     Blood Culture, Routine No growth after 5 days.    Narrative:       Draw from picc    MRSA Screen by PCR [[id number]] Collected:  03/12/20 0330    Order Status:  Completed Specimen:  Nasopharyngeal Swab from Nasal Updated:  03/13/20 0148     MRSA SCREEN BY PCR Negative    Blood culture [[id number]] Collected:  03/07/20 0656    Order Status:  Completed Specimen:  Blood Updated:  03/12/20 1032     Blood Culture, Routine No growth after 5 days.          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  Appreciate ID input  Given history of IV drug abuse and need for long-term antibiotics she will need placement; case management working on the same  As per case management placement is difficult secondary to history of IV drug abuse and requiring methadone  Will discuss with ID about alternative ways of administrating long-term antibiotics - possible daptomycin via home health   Continue methadone for opiate use disorder  Outpatient follow-up for hepatitis C      VTE Risk Mitigation (From admission, onward)         Ordered     enoxaparin injection 40 mg  Daily      02/29/20 1725     IP VTE HIGH RISK PATIENT  Once      02/29/20 1725                      Sumeet Rodriguez MD  Department of Hospital Medicine   [facility]

## 2020-03-17 NOTE — PROGRESS NOTES
Pharmacokinetic Assessment Follow Up: IV Vancomycin    Vancomycin serum concentration assessment(s):    The trough level was drawn correctly and can be used to guide therapy at this time. The measurement is below the desired definitive target range of 15 to 20 mcg/mL.    Vancomycin Regimen Plan:    Change regimen to Vancomycin 1500 mg IV every 12 hours with next serum trough concentration measured at 07:00 prior to 20th dose on 3/19     Drug levels (last 3 results):  Recent Labs   Lab Result Units 03/15/20  0342 03/17/20  0620   Vancomycin-Trough ug/mL 23.6* 10.9       Pharmacy will continue to follow and monitor vancomycin.    Please contact pharmacy at extension [phone] for questions regarding this assessment.    Thank you for the consult,   Cinthia Billingsley       Patient brief summary:  Soraya Gee is a 34 y.o. female initiated on antimicrobial therapy with IV Vancomycin for treatment of endocarditis    The patient's current regimen is 1500 mg IV every 16 hours    Drug Allergies:   Review of patient's allergies indicates:  No Known Allergies    Actual Body Weight:   80.7 kg    Renal Function:   Estimated Creatinine Clearance: 127.4 mL/min (based on SCr of 0.7 mg/dL).,     Dialysis Method (if applicable):  N/A    CBC (last 72 hours):  Recent Labs   Lab Result Units 03/16/20  0355   WBC K/uL 7.20   Hemoglobin g/dL 7.7*   Hematocrit % 23.9*   Platelets K/uL 275   Gran% % 48.7   Lymph% % 35.6   Mono% % 11.9   Eosinophil% % 2.2   Basophil% % 1.3   Differential Method  Automated       Metabolic Panel (last 72 hours):  Recent Labs   Lab Result Units 03/14/20  1647 03/16/20  0355   Sodium mmol/L  --  137   Potassium mmol/L  --  4.2   Chloride mmol/L  --  103   CO2 mmol/L  --  27   Glucose mg/dL  --  93   Glucose, UA  Negative  --    BUN, Bld mg/dL  --  18   Creatinine mg/dL  --  0.7   Albumin g/dL  --  2.8*   Total Bilirubin mg/dL  --  1.3*   Alkaline Phosphatase U/L  --  94   AST U/L  --  44*   ALT U/L  --  63*       Vancomycin  Administrations:  vancomycin given in the last 96 hours                     vancomycin (VANCOCIN) 1,500 mg in dextrose 5 % 500 mL IVPB (mg) 1,500 mg New Bag 03/16/20 1642     1,500 mg New Bag  0000     1,500 mg New Bag 03/15/20 0814                      Microbiologic Results:  Microbiology Results (last 7 days)       Procedure Component Value Units Date/Time    Blood culture [[id number]] Collected:  03/15/20 0505    Order Status:  Completed Specimen:  Blood Updated:  03/17/20 0632     Blood Culture, Routine No Growth to date      No Growth to date      No Growth to date    Narrative:       Collection has been rescheduled by KS3 at 03/15/2020 04:18 Reason: pt   wants to see if RN will draw from PICC  Collection has been rescheduled by KS3 at 03/15/2020 04:18 Reason: pt   wants to see if RN will draw from PICC    Blood culture [[id number]] Collected:  03/10/20 1155    Order Status:  Completed Specimen:  Blood from Line, PICC Right Basilic Updated:  03/15/20 1432     Blood Culture, Routine No growth after 5 days.    Narrative:       Draw from picc    Blood culture [[id number]] Collected:  03/10/20 0420    Order Status:  Completed Specimen:  Blood from Line, PICC Right Brachial Updated:  03/15/20 0632     Blood Culture, Routine No growth after 5 days.    Narrative:       Unit draw per picc line    Blood culture [[id number]] Collected:  03/09/20 1622    Order Status:  Completed Specimen:  Blood from Line, PICC Right Basilic Updated:  03/14/20 1832     Blood Culture, Routine No growth after 5 days.    Narrative:       Draw from picc    MRSA Screen by PCR [[id number]] Collected:  03/12/20 0330    Order Status:  Completed Specimen:  Nasopharyngeal Swab from Nasal Updated:  03/13/20 0148     MRSA SCREEN BY PCR Negative    Blood culture [[id number]] Collected:  03/07/20 0656    Order Status:  Completed Specimen:  Blood Updated:  03/12/20 1032     Blood Culture, Routine No growth after 5 days.

## 2020-03-18 VITALS
DIASTOLIC BLOOD PRESSURE: 90 MMHG | OXYGEN SATURATION: 97 % | HEIGHT: 68 IN | SYSTOLIC BLOOD PRESSURE: 147 MMHG | HEART RATE: 99 BPM | BODY MASS INDEX: 26.97 KG/M2 | TEMPERATURE: 98 F | RESPIRATION RATE: 18 BRPM | WEIGHT: 177.94 LBS

## 2020-03-18 LAB
ALBUMIN SERPL BCP-MCNC: 2.7 G/DL (ref 3.5–5.2)
ALP SERPL-CCNC: 84 U/L (ref 55–135)
ALT SERPL W/O P-5'-P-CCNC: 92 U/L (ref 10–44)
ANION GAP SERPL CALC-SCNC: 4 MMOL/L (ref 8–16)
AST SERPL-CCNC: 65 U/L (ref 10–40)
BASOPHILS # BLD AUTO: 0.08 K/UL (ref 0–0.2)
BASOPHILS NFR BLD: 1.2 % (ref 0–1.9)
BILIRUB SERPL-MCNC: 1.2 MG/DL (ref 0.1–1)
BUN SERPL-MCNC: 19 MG/DL (ref 6–20)
CALCIUM SERPL-MCNC: 9.1 MG/DL (ref 8.7–10.5)
CHLORIDE SERPL-SCNC: 105 MMOL/L (ref 95–110)
CO2 SERPL-SCNC: 30 MMOL/L (ref 23–29)
CREAT SERPL-MCNC: 0.6 MG/DL (ref 0.5–1.4)
DIFFERENTIAL METHOD: ABNORMAL
EOSINOPHIL # BLD AUTO: 0.1 K/UL (ref 0–0.5)
EOSINOPHIL NFR BLD: 1.7 % (ref 0–8)
ERYTHROCYTE [DISTWIDTH] IN BLOOD BY AUTOMATED COUNT: 15.1 % (ref 11.5–14.5)
EST. GFR  (AFRICAN AMERICAN): >60 ML/MIN/1.73 M^2
EST. GFR  (NON AFRICAN AMERICAN): >60 ML/MIN/1.73 M^2
GLUCOSE SERPL-MCNC: 84 MG/DL (ref 70–110)
HCT VFR BLD AUTO: 24.5 % (ref 37–48.5)
HGB BLD-MCNC: 7.9 G/DL (ref 12–16)
IMM GRANULOCYTES # BLD AUTO: 0.02 K/UL (ref 0–0.04)
IMM GRANULOCYTES NFR BLD AUTO: 0.3 % (ref 0–0.5)
LYMPHOCYTES # BLD AUTO: 2.7 K/UL (ref 1–4.8)
LYMPHOCYTES NFR BLD: 41.7 % (ref 18–48)
MCH RBC QN AUTO: 31.5 PG (ref 27–31)
MCHC RBC AUTO-ENTMCNC: 32.2 G/DL (ref 32–36)
MCV RBC AUTO: 98 FL (ref 82–98)
MONOCYTES # BLD AUTO: 0.7 K/UL (ref 0.3–1)
MONOCYTES NFR BLD: 10.2 % (ref 4–15)
NEUTROPHILS # BLD AUTO: 2.9 K/UL (ref 1.8–7.7)
NEUTROPHILS NFR BLD: 44.9 % (ref 38–73)
NRBC BLD-RTO: 0 /100 WBC
PLATELET # BLD AUTO: 245 K/UL (ref 150–350)
PMV BLD AUTO: 9.5 FL (ref 9.2–12.9)
POTASSIUM SERPL-SCNC: 4.1 MMOL/L (ref 3.5–5.1)
PROT SERPL-MCNC: 7.4 G/DL (ref 6–8.4)
RBC # BLD AUTO: 2.51 M/UL (ref 4–5.4)
SODIUM SERPL-SCNC: 139 MMOL/L (ref 136–145)
WBC # BLD AUTO: 6.54 K/UL (ref 3.9–12.7)

## 2020-03-18 PROCEDURE — 80053 COMPREHEN METABOLIC PANEL: CPT

## 2020-03-18 PROCEDURE — 25000003 PHARM REV CODE 250: Performed by: INTERNAL MEDICINE

## 2020-03-18 PROCEDURE — 85025 COMPLETE CBC W/AUTO DIFF WBC: CPT

## 2020-03-18 RX ORDER — ONDANSETRON 4 MG/1
4 TABLET, ORALLY DISINTEGRATING ORAL EVERY 8 HOURS PRN
Qty: 20 TABLET | Refills: 0 | Status: SHIPPED | OUTPATIENT
Start: 2020-03-18 | End: 2020-03-28

## 2020-03-18 RX ORDER — METHADONE HYDROCHLORIDE 10 MG/1
30 TABLET ORAL DAILY
Refills: 0
Start: 2020-03-19

## 2020-03-18 RX ADMIN — METHADONE HYDROCHLORIDE 30 MG: 10 TABLET ORAL at 08:03

## 2020-03-18 NOTE — DISCHARGE SUMMARY
[facility] Medicine  Discharge Summary      Patient Name: Soraya Gee  MRN: [id number]  Admission Date: 2/29/2020  Hospital Length of Stay: 18 days  Discharge Date and Time:  03/18/2020 11:20 AM  Attending Physician: Sumeet Rodriguez MD   Discharging Provider: Sumeet Rodriguez MD  Primary Care Provider: Jus Griggs Iii, MD      HPI:   34-year-old patient getting admitted with suspected sepsis from acute infective endocarditis  Patient is a IV drug abuse and uses heroin on a daily basis  For the past 2 days she has been having fever/myalgia/malaise and generalized weakness all over the body  Later she started having shortness of breath and patient did notice that her legs are getting more swollen  Because of the persistence of symptoms she came to the emergency room and got admitted  Patient initially thought she might have got cotton fever  Per patient if she will not take heroin every day she will develop severe withdrawal symptoms  No other issues.  She lives alone but has got a boyfriend who lives very nearby       Procedure(s) (LRB):  Transesophageal echo (NAVA) intra-procedure log documentation (N/A)      Hospital Course:   Patient was admitted with most likely bacterial endocarditis  She has daily urine IV drug user.  Methadone started yesterday and increased the dose further  Still having persistent fevers, blood culture with persistent  MRSA  Seen and examined the patient she is awake alert oriented and able to answer all the questions, off Precedex  Patient is going to need NAVA    3/3: Ms Gee is sedated on precedex and methadone. Pt arouses but has limited response to questions but she did move all extremities,    3/4: Pt is awake and alert with c/o 8/10 flank pain. Pt has 4/10 pain with oxycontin for break thru pain. She has no neurologic complaints. I have discussed the case with Dr Rodriguez and appreciate her imput. Pt nurse and I discussed the need for rehab Re opiate  dependency.    3/5: Pt states that the methadone really helps her pain and opiate cravings. She has taken only 1 oxycodone today. Pt with seek treatment at a methadone clinic post discharge. Her side pain has improved from an 8/10 to < 4/10. Pt has improved enough to be sent to telemetry    3/6: Ms Elizondo has no complaints. Case discussed with Dr Rodriguez may go to an LTiiAC for further therapy. I will consult  for the above    3/7: I am feeling better, my side pain is low( 4/10 at worst) . Pt agrees to LTAC placement    3/8: The patient is achy but comfortable with pain < 4/10    3/9 The patient is not satisfied with the nursing procedures with her IV. She cannot be specific. Pt denies fever, chills , sob, solomon, new sensory or motor deficit. No suicidal or violent ideation.    3/10:  No pain today.  No SOB.  R ankle edema noted.     3/11:  Pt resting comfortably.  No pain.  No SOB.  No dizziness.     3/12:  LTAC denied.  I will not send home an IVDU with a PICC.  SNF now in progress.  Pt denies pain or SOB.     3/13:  SNF in progress.  No pain, no SOB.  In good spirits.     3/14: no pain.  No SOB.    03/15-03/18 - Assumed care of patient on 03/15.  Being treated for MRSA bacteremia in the setting of IV drug abuse.  Also noted to have tricuspid valve endocarditis on NAVA.  Tolerating antibiotics without any issues.  Initial plan was to discharge her to skilled nursing facility for long-term antibiotics; however it was decided to change to dalbavancin in order to be able to send her home.  Home infusion center to administer antibiotics at home.  Case management has obtained information for methadone clinic outpatient.  Patient has been counseled on opiate use disorder.  She is keen to stay off illicit drugs.  Advised to follow up with consultants as mentioned below.  Medically stable to be discharged home.    Instructions provided to follow up with primary care physician as outpatient. Patient verbalized  understanding and is aware to contact primary care physician or return to ED if new or worsening symptoms.    Physical exam on the day of discharge:  General: Patient resting comfortably in no acute distress.  Lungs: CTA. Good air entry.  Cor: Regular rate and rhythm. No murmurs. No pedal edema.  Abd: Soft. Nontender. Non-distended.  Neuro: A&O x3. Moving all 4 extremities equally  Ext: No clubbing. No cyanosis.        Consults:   Consults (From admission, onward)        Status Ordering Provider     Inpatient consult to Cardiology  Once     Provider:  Nate Kaiser MD    Completed CHAPITO GEE     Inpatient consult to Cardiology  Once     Provider:  Nate Kaiser MD    Acknowledged LOBO VALDEZ     Inpatient consult to Hospitalist  Once     Provider:  Chapito Gee MD    Acknowledged LOBO VALDEZ     Inpatient consult to Infectious Diseases  Once     Provider:  Sharmila Rodriguez MD    Completed CHAPITO GEE     Inpatient consult to PICC Line Nurse  Once     Provider:  (Not yet assigned)    Acknowledged LOBO VALDEZ     Inpatient consult to PICC Line Nurse  Once     Provider:  (Not yet assigned)    Acknowledged SHARMILA RODRIGUEZ     Inpatient consult to   Once     Provider:  (Not yet assigned)    Completed AZIZA ROGER     Inpatient consult to Social Work/Case Management  Once     Provider:  (Not yet assigned)    Completed DINA COUGHLIN     Inpatient consult to Social Work/Case Management  Once     Provider:  (Not yet assigned)    Completed SHARMILA RODRIGUEZ     Pharmacy to dose Vancomycin consult  Once     Provider:  (Not yet assigned)    Acknowledged SHARMILA RODRIGUEZ          No new Assessment & Plan notes have been filed under this hospital service since the last note was generated.  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 Schedule an appointment as soon as possible for a visit in 1 week.    Specialty:  Family Medicine  Why:  Hospital discharge follow-up  Contact information:  [address] [address]  [phone]             Nate Kaiser MD. Schedule an appointment as soon as possible for a visit in 2 weeks.    Specialties:  Cardiovascular Disease, Cardiology  Why:  Tricuspid valve endocarditis follow-up  Contact information:  [address]  [address]  [place] [address]  [phone]             Paula Rodriguez MD. Schedule an appointment as soon as possible for a visit in 2 weeks.    Specialty:  Infectious Diseases  Why:  MRSA bacteremia follow-up  Contact information:  [facility]  [address]  [place] [address]  [phone]                 Patient Instructions:      Diet Cardiac     Notify your health care provider if you experience any of the following:  temperature >100.4     Notify your health care provider if you experience any of the following:  persistent nausea and vomiting or diarrhea     Notify your health care provider if you experience any of the following:  persistent dizziness, light-headedness, or visual disturbances     Activity as tolerated       Significant Diagnostic Studies: Labs:   CMP   Recent Labs   Lab 03/18/20  0500      K 4.1      CO2 30*   GLU 84   BUN 19   CREATININE 0.6   CALCIUM 9.1   PROT 7.4   ALBUMIN 2.7*   BILITOT 1.2*   ALKPHOS 84   AST 65*   ALT 92*    ANIONGAP 4*   ESTGFRAFRICA >60.0   EGFRNONAA >60.0    and CBC   Recent Labs   Lab 03/18/20  0500   WBC 6.54   HGB 7.9*   HCT 24.5*        Microbiology Results (last 7 days)     Procedure Component Value Units Date/Time    Blood culture [[id number]] Collected:  03/15/20 0505    Order Status:  Completed Specimen:  Blood Updated:  03/18/20 0632     Blood Culture, Routine No Growth to date      No Growth to date      No Growth to date      No Growth to date    Narrative:       Collection has been rescheduled by KS3 at 03/15/2020 04:18 Reason: pt   wants to see if RN will draw from PICC  Collection has been rescheduled by KS3 at 03/15/2020 04:18 Reason: pt   wants to see if RN will draw from PICC    Blood culture [[id number]] Collected:  03/10/20 1155    Order Status:  Completed Specimen:  Blood from Line, PICC Right Basilic Updated:  03/15/20 1432     Blood Culture, Routine No growth after 5 days.    Narrative:       Draw from picc    Blood culture [[id number]] Collected:  03/10/20 0420    Order Status:  Completed Specimen:  Blood from Line, PICC Right Brachial Updated:  03/15/20 0632     Blood Culture, Routine No growth after 5 days.    Narrative:       Unit draw per picc line    Blood culture [[id number]] Collected:  03/09/20 1622    Order Status:  Completed Specimen:  Blood from Line, PICC Right Basilic Updated:  03/14/20 1832     Blood Culture, Routine No growth after 5 days.    Narrative:       Draw from picc    MRSA Screen by PCR [[id number]] Collected:  03/12/20 0330    Order Status:  Completed Specimen:  Nasopharyngeal Swab from Nasal Updated:  03/13/20 0148     MRSA SCREEN BY PCR Negative    Blood culture [[id number]] Collected:  03/07/20 0656    Order Status:  Completed Specimen:  Blood Updated:  03/12/20 1032     Blood Culture, Routine No growth after 5 days.          Pending Diagnostic Studies:     Procedure Component Value Units Date/Time    Drug screen panel, emergency [[id number]] Collected:  03/14/20  1651    Order Status:  Sent Lab Status:  In process Updated:  03/14/20 1651    Specimen:  Urine, Clean Catch          Medications:  Reconciled Home Medications:      Medication List      START taking these medications    dalbavancin 500 mg Soln injection  Commonly known as:  DALVANCE  Inject 75 mLs (1,500 mg total) into the vein As instructed (Every 8 days (total of 2 doses)).     methadone 10 MG tablet  Commonly known as:  DOLOPHINE  Take 3 tablets (30 mg total) by mouth once daily.  Start taking on:  March 19, 2020     ondansetron 4 MG Tbdl  Commonly known as:  ZOFRAN-ODT  Take 1 tablet (4 mg total) by mouth every 8 (eight) hours as needed.            Indwelling Lines/Drains at time of discharge:   Lines/Drains/Airways     Peripherally Inserted Central Catheter Line            PICC Double Lumen 03/06/20 0920 right basilic 12 days                Time spent on the discharge of patient: 35 minutes  Patient was seen and examined on the date of discharge and determined to be suitable for discharge.         Sumeet Rodriguez MD  Department of Hospital Medicine  [facility]

## 2020-03-18 NOTE — PLAN OF CARE
03/18/20 1134   Final Note   Assessment Type Final Discharge Note   Anticipated Discharge Disposition IV Therapy   Patient to discharge home with Home IV services. Sol from [facility] [employer] already got authorization and will administer dalvance this evening once she gets home and second dose will be given 8 days later. Already sent referral to the [facility] clinic in [place] for patient and spoke to Shakira there. Gave patient all the information needed to go to [facility] for intake. Patient knows once discharged today, she will have to go to [facility] at [address] [place] [address] [place] [address] between 5:30am and 7:00am tomorrow Thursday March 19, 2020 in Morning to do intake to get started on her [facility]. Patient has the hours of operation and knows that she has to go 6 days a week. Gave her phone number which is [phone]. She said she will get transportation there.  She also knows that she will have to follow up with Dr. Rodriguez and PCP. The information for follow-up will be in discharge paperwork.

## 2020-03-18 NOTE — PROGRESS NOTES
"Progress Note  Infectious Disease    Reason for Consult:  Probable endocarditis    02/29/2020.  Consult note. Soraya Gee is a  appearing 34 y.o. female with past medical history of hepatitis-C, IV drug use, in present for 3 years, released in March 2019, that is when she resumed IV drugs.  She comes in complaining of shaking chills, fever, myalgia, malaise, generalized weakness, 3 days duration after injecting some "bad drugs".  Initially she thought she had cotton fever but the symptoms persisted so she came to ER.  Patient also complains of shortness of breath, she was tachycardic and tachypneic on admission.  She has noticed swelling throughout.  Her albumin is low  Patient is a IV drug abuse and uses heroin twice a day, for a total daily dose of 1 g.  No sick contacts.  She is admitted in ICU for closer observation to make sure she does not withdrawal.  Patient is tachycardic, tachypneic, hurting all over, her history is limited by although this    03/01/2020.  Withdrawal symptoms are improved with Precedex drip; although she still feels like she is breathing fast and she is hurting all over.  She spiked fever of 101.3.  Blood cultures came positive for GPC.  No urinary complaints, no new skin or joint complaint, no cough or phlegm.  03/02/2020.  She spiked another fever of 102.3.  All cultures are turning up to be MRSA  CRP improved from 20/5 down to 13.  WBC is still elevated.  She gets confused on and off but then she interacts appropriately.    3/3:  Chart reviewed and discussed with Dr. Mccormack.  Persistently positive blood cultures with MRSA, vancomycin EDDIE 1.  Persistently hectic fever and septic appearance, improve hyperbilirubinemia, right upper quadrant tenderness, generalized aches and pains, sedated a bit on Precedex and methadone, neurologically intact.  3/4: still febrile through late last night. 3/2 cultures negative, but 3/3 cultures have GPC. CT chest/abd/pelvis reviewed and septic pulmonary " "emboli are present, but no spleen, muscle or paraspinous lesions noted.  She is currently up in the chair, off Precedex.  We discussed her diagnosis and she is willing to receive the treatment.  Does not have much appetite but she will drink the in [place].  She still hurts everywhere and does have some pleurisy.  3/5: temperature improving. Blood cultures from yesterday are negative so far. She looks much improved, clearer mentally, still very uncomfortable, somewhat anxious. Not remembering information given yesterday. Expresses desire to "get off " the drugs. Still has no appetite. Drinking a little ensure. No diarrhea. Still splinting from pleurisy. Discussed with her about the NAVA. Length of therapy.   3/6: afebrile. Blood cultures neg x 2 days. Very anxious/hyperventilating, about NAVA. D/w anesthesia. Reassured her. No nausea, vomiting. Ok for picc line.  3/7: afebrile. VSS. 1 blood culture from yesterday drawn peripherally "left wrist" has GPC. I cannot confirm that it wasn't drawn from midline but site is different. NAVA demonstrated TV vegetation with flail leaflet and TR. No change in vision, no new MSK pain, or skin lesion. Still has some pleural discomfort associated with septic emboli.   3/8: 3/6 blood culture identified as Staph aureus. She went outside but now knows she is restricted. She has showered and put on make up. She is eating well. No diarrhea. Mild LE edema which she states is not new. No antibiotic intolerance. No change in vision, new MSK pain, new skin lesion  3/9: blood cultures not drawn until this afternoon. She feels well. Eating well. Would like to walk more. LTAC eval in progress.   3/10:  In great spirits, recent blood cultures negative.  Was visited by representative from the LTAC of her choice.  Changed over to vancomycin again.  No pleuritic complaints, appetite is good, no diarrhea, no problems with PICC line today.  No change in vision, no new musculoskeletal pain or skin " lesions.    03/13/2020 Tmax 99.4.  afebrile now.  No chills.  She has the same chest discomfort, 2/10, worse when breathing in deep, better with rest, no associated cough or phlegm.  She admits that she feels way better than a week ago.  Good appetite.  No nausea no vomiting no diarrhea.      03/14/2020.  Patient feels well.  She states she gets some temps on and off She had a temperature of 100.6°.  WBC is still low at 8.37.  03/15/2020.  Patient had no more fevers.  She continues to tolerate vancomycin well.  She has no new complaints.  She workup from yesterday looks negative so far.  Left arm midline has been discontinued.  Discussed with hospitalist.  Discussed with patient.   03/16/2020.  Standing up in the room.  She tries to keep busy her with reading make since etc.  T-max 98.7°.  No new complaints.    3/17: interval reviewed. D/w CM regarding discharge planning. She is doing very well.     Antibiotics (From admission, onward)    Start     Stop Route Frequency Ordered    03/17/20 0800  vancomycin (VANCOCIN) 1,500 mg in dextrose 5 % 500 mL IVPB      -- IV Every 12 hours (non-standard times) 03/17/20 0758    03/10/20 1202  vancomycin - pharmacy to dose  (vancomycin IVPB)      -- IV pharmacy to manage frequency 03/10/20 1102        Antifungals (From admission, onward)    None        Antivirals (From admission, onward)    None            EXAM & DIAGNOSTICS REVIEWED:   Vitals:     Temp:  [98 °F (36.7 °C)-98.6 °F (37 °C)]   Temp: 98.3 °F (36.8 °C) (03/17/20 2107)  Pulse: 85 (03/17/20 2107)  Resp: 16 (03/17/20 2107)  BP: 103/64 (03/17/20 2107)  SpO2: 100 % (03/17/20 2107)    Intake/Output Summary (Last 24 hours) at 3/17/2020 2120  Last data filed at 3/17/2020 1300  Gross per 24 hour   Intake 2145 ml   Output --   Net 2145 ml     Vitals:    03/17/20 2107   BP: 103/64   Pulse: 85   Resp: 16   Temp: 98.3 °F (36.8 °C)         General:  Alert cooperative , looks and feels great   Eyes:  anicteric, PERRL, EOMI, no  scleral conjunctival lesions  ENT:  No ulcers, exudates, thrush, nares patent, edentulous/dentures.     Neck:  Supple,   Lungs:    Heart:     Abd:     :  Voiding without difficulty  Musc:  No focal joint swelling, synovitis, myositis, moves all extremities  Skin:  Marks from prior IV use.    without septic emboli, no subungual petechia   Wound:   Neuro:  speech fluent, face symmetric, moves all extremities, no focal weakness.   Psych:  Alert, pleasant, motivated  cooperative   Lymphatic:        Extrem:  No palpable edema  , phlebitis, cellulitis, warm and well perfused  VAD:   picc right  Isolation:  none    Lines/Tubes/Drains:  Peripheral IV    General Labs reviewed:  Recent Labs   Lab 03/12/20  0349 03/14/20  0500 03/16/20  0355   WBC 11.24 8.37 7.20   HGB 8.7* 8.0* 7.7*   HCT 26.6* 24.7* 23.9*   * 383* 275       Recent Labs   Lab 03/12/20  0349 03/14/20  0500 03/16/20  0355   * 136 137   K 4.2 4.2 4.2    102 103   CO2 26 27 27   BUN 19 23* 18   CREATININE 0.6 0.6 0.7   CALCIUM 8.8 8.9 9.0   PROT 8.4 8.0 7.7   BILITOT 1.8* 1.5* 1.3*   ALKPHOS 107 97 94   ALT 46* 50* 63*   AST 35 36 44*       Recent Labs   Lab 03/15/20  0505   CRP 1.87*             Micro:  Microbiology Results (last 7 days)     Procedure Component Value Units Date/Time    Blood culture [[id number]] Collected:  03/15/20 0505    Order Status:  Completed Specimen:  Blood Updated:  03/17/20 0632     Blood Culture, Routine No Growth to date      No Growth to date      No Growth to date    Narrative:       Collection has been rescheduled by [email or web address] at 03/15/2020 04:18 Reason: pt   wants to see if RN will draw from PICC  Collection has been rescheduled by [facility]3 at 03/15/2020 04:18 Reason: pt   wants to see if RN will draw from PICC    Blood culture [[id number]] Collected:  03/10/20 1155    Order Status:  Completed Specimen:  Blood from Line, PICC Right Basilic Updated:  03/15/20 1432     Blood Culture, Routine No growth after 5 days.     Narrative:       Draw from picc    Blood culture [[id number]] Collected:  03/10/20 0420    Order Status:  Completed Specimen:  Blood from Line, PICC Right Brachial Updated:  03/15/20 0632     Blood Culture, Routine No growth after 5 days.    Narrative:       Unit draw per picc line    Blood culture [[id number]] Collected:  03/09/20 1622    Order Status:  Completed Specimen:  Blood from Line, PICC Right Basilic Updated:  03/14/20 1832     Blood Culture, Routine No growth after 5 days.    Narrative:       Draw from picc    MRSA Screen by PCR [[id number]] Collected:  03/12/20 0330    Order Status:  Completed Specimen:  Nasopharyngeal Swab from Nasal Updated:  03/13/20 0148     MRSA SCREEN BY PCR Negative    Blood culture [[id number]] Collected:  03/07/20 0656    Order Status:  Completed Specimen:  Blood Updated:  03/12/20 1032     Blood Culture, Routine No growth after 5 days.        Imaging Reviewed:  Chest x-rays    Chest x-ray 0314 Nodular opacities within both lungs correlate with recent chest CT findings, at which time septic emboli were suspected.    CXR Mild central hilar bronchovascular crowding, possibly secondary to low lung volumes and atelectasis or very mild edema, or atypical infection/bronchitis as above    US1.  Sludge seen in the gallbladder, without findings to specifically suggest acute cholecystitis (noting borderline gallbladder wall thickening).  2.  Rounded echogenic focus in the right lobe of the liver, with imaging features of an hepatic hemangioma, consider ultrasound follow-up in 6 months to document stability.  3.  Numerous small periportal lymph nodes, likely reactive/inflammatory in nature.  Consider correlation with liver function tests and hepatitis serologies.    CT chest/abd/pelvis 3/3  Bilateral diffuse nodular predominantly pleural based opacities without cavitation.  Findings are most consistent with septic emboli.  Multifocal pneumonia could not be excluded.  There is bibasilar  airspace disease which may represent atelectasis or infiltrates with small bilateral pleural effusions  Mild hepatosplenomegaly.  The hyperechoic mass in the right lobe of the liver is not seen on CT and most likely represents hemangioma  No acute abnormality within the abdomen or pelvis    Cardiology:    Transthoracic echo 3/1  Left Ventricle Normal ejection fraction at 60%. Normal left ventricular cavity size. Normal wall thickness observed. Normal left ventricular diastolic function. Normal left atrial pressure. No wall motion abnormalities.   Right Ventricle Normal cavity size, wall thickness and systolic function. Wall motion normal.   Left Atrium The left atrium is normal.   Right Atrium There is normal right atrial size.   Aortic Valve The aortic valve appears structurally normal. There is no stenosis. No regurgitation. There is normal leaflet mobility.The LVOT diameter is 2.02 cm.   Mitral Valve The mitral valve appears structurally normal. There is normal leaflet mobility. No regurgitation.   Tricuspid Valve The tricuspid valve appears structurally normal. No stenosis. Trace regurgitation. There is normal leaflet mobility. The estimated PA systolic pressure is 26 mmHg.   Pulmonic Valve The pulmonic valve was not well visualized. No stenosis. No regurgitation.   IVC/SVC Normal central venous pressure (3 mm Hg).   Ascending Aorta The aortic root and ascending aorta are normal in size.   Pericardium No pericardial effusion.     NAVA 3/6:  1. Aortic valve is structurally normal with good leaflet excursion. No significant regurgitation.   2. Mitral valve is structurally normal with good leaflet excursion. Trace regurgitation.   3. Tricuspid valve: There appears to be a small vegetation (0.9 X 0.4 cm) attached to the base of the septal leaflet. There also appears to be prolapse and flail of the leaflets causing severe eccentric tricuspid regurgitation.   4. Pulmonary valve is structurally normal with good leaflet  excursion. No significant regurgitation.  5. Overall LVEF and RVEF appears normal.   6. No obvious shunt across the interatrial septum by color flow Doppler and agitated saline.     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 Septic pulmonary emboli  Flail valve leaflet with tricuspid regurg  Elevated markers of inflammation including procal, CRP, ESR. improving  Recent temp on 03/13/2020, 100.6.  Workup negative so far.     TELLY. resolved   Epigastric right upper quadrant discomfort and generalized abdominal discomfort.-resolved  Jaundice Transaminitis, heavy opiate use, H/o Hep C, untreated, ammonia normal:  Bilirubin improved  IVDU. heroin  Anemia;   Protein malnutrition.  Hypoalbuminemia =  2.6; anxiety  CRP trend 14.9--10.18--2.52--1.87  Procalcitonin 4.71--0.08.    Recommendations:  Patient has been treated with appropriate antibiotics since 02/29/2020.  Initially with vancomycin and Dapto;   then with daptomycin and Teflaro.  Converted  back to vancomycin  On 03/10   Because we have been unable to place her and she is doing very well clinically, will plan for home IV dalvance 1500 mg on the day of discharge or after and 8 days later  D/w Dr. whittaker and CM  Will need to follow up in our clinic as well as her primary and with cardiology  She will find transport to methadone clinic

## 2020-03-20 LAB — BACTERIA BLD CULT: NORMAL

## 2020-04-23 ENCOUNTER — LAB VISIT (OUTPATIENT)
Dept: LAB | Facility: HOSPITAL | Age: 35
End: 2020-04-23
Attending: INTERNAL MEDICINE
Payer: MEDICAID

## 2020-04-23 ENCOUNTER — OFFICE VISIT (OUTPATIENT)
Dept: INFECTIOUS DISEASES | Facility: CLINIC | Age: 35
End: 2020-04-23
Payer: MEDICAID

## 2020-04-23 VITALS
HEIGHT: 68 IN | SYSTOLIC BLOOD PRESSURE: 111 MMHG | TEMPERATURE: 98 F | WEIGHT: 175 LBS | BODY MASS INDEX: 26.52 KG/M2 | OXYGEN SATURATION: 98 % | DIASTOLIC BLOOD PRESSURE: 59 MMHG | HEART RATE: 62 BPM

## 2020-04-23 DIAGNOSIS — B18.2 CHRONIC HEPATITIS C WITHOUT HEPATIC COMA: ICD-10-CM

## 2020-04-23 DIAGNOSIS — A41.02: ICD-10-CM

## 2020-04-23 DIAGNOSIS — F11.21 OPIOID DEPENDENCE IN REMISSION: ICD-10-CM

## 2020-04-23 DIAGNOSIS — I26.90 SEPTIC PULMONARY EMBOLISM WITHOUT ACUTE COR PULMONALE, UNSPECIFIED CHRONICITY: ICD-10-CM

## 2020-04-23 DIAGNOSIS — D63.8 ANEMIA OF CHRONIC DISEASE: ICD-10-CM

## 2020-04-23 DIAGNOSIS — R65.20: ICD-10-CM

## 2020-04-23 DIAGNOSIS — I07.9 ENDOCARDITIS OF TRICUSPID VALVE: Primary | ICD-10-CM

## 2020-04-23 DIAGNOSIS — F17.200 SMOKER: ICD-10-CM

## 2020-04-23 DIAGNOSIS — I36.1 NONRHEUMATIC TRICUSPID VALVE REGURGITATION: ICD-10-CM

## 2020-04-23 DIAGNOSIS — G93.41: ICD-10-CM

## 2020-04-23 LAB
ALBUMIN SERPL BCP-MCNC: 3.8 G/DL (ref 3.5–5.2)
ALP SERPL-CCNC: 100 U/L (ref 55–135)
ALT SERPL W/O P-5'-P-CCNC: 150 U/L (ref 10–44)
ANION GAP SERPL CALC-SCNC: 6 MMOL/L (ref 8–16)
AST SERPL-CCNC: 83 U/L (ref 10–40)
BASOPHILS # BLD AUTO: 0.04 K/UL (ref 0–0.2)
BASOPHILS NFR BLD: 0.6 % (ref 0–1.9)
BILIRUB SERPL-MCNC: 0.8 MG/DL (ref 0.1–1)
BUN SERPL-MCNC: 13 MG/DL (ref 6–20)
CALCIUM SERPL-MCNC: 9.3 MG/DL (ref 8.7–10.5)
CHLORIDE SERPL-SCNC: 105 MMOL/L (ref 95–110)
CO2 SERPL-SCNC: 25 MMOL/L (ref 23–29)
CREAT SERPL-MCNC: 0.5 MG/DL (ref 0.5–1.4)
DIFFERENTIAL METHOD: ABNORMAL
EOSINOPHIL # BLD AUTO: 0.2 K/UL (ref 0–0.5)
EOSINOPHIL NFR BLD: 3.7 % (ref 0–8)
ERYTHROCYTE [DISTWIDTH] IN BLOOD BY AUTOMATED COUNT: 13.4 % (ref 11.5–14.5)
EST. GFR  (AFRICAN AMERICAN): >60 ML/MIN/1.73 M^2
EST. GFR  (NON AFRICAN AMERICAN): >60 ML/MIN/1.73 M^2
GLUCOSE SERPL-MCNC: 79 MG/DL (ref 70–110)
HCT VFR BLD AUTO: 38.9 % (ref 37–48.5)
HGB BLD-MCNC: 12.5 G/DL (ref 12–16)
IMM GRANULOCYTES # BLD AUTO: 0.01 K/UL (ref 0–0.04)
IMM GRANULOCYTES NFR BLD AUTO: 0.2 % (ref 0–0.5)
IRON SERPL-MCNC: 128 UG/DL (ref 30–160)
LYMPHOCYTES # BLD AUTO: 3.2 K/UL (ref 1–4.8)
LYMPHOCYTES NFR BLD: 48.5 % (ref 18–48)
MCH RBC QN AUTO: 31.5 PG (ref 27–31)
MCHC RBC AUTO-ENTMCNC: 32.1 G/DL (ref 32–36)
MCV RBC AUTO: 98 FL (ref 82–98)
MONOCYTES # BLD AUTO: 0.4 K/UL (ref 0.3–1)
MONOCYTES NFR BLD: 6.3 % (ref 4–15)
NEUTROPHILS # BLD AUTO: 2.7 K/UL (ref 1.8–7.7)
NEUTROPHILS NFR BLD: 40.7 % (ref 38–73)
NRBC BLD-RTO: 0 /100 WBC
PLATELET # BLD AUTO: 202 K/UL (ref 150–350)
PMV BLD AUTO: 10.9 FL (ref 9.2–12.9)
POTASSIUM SERPL-SCNC: 4.1 MMOL/L (ref 3.5–5.1)
PROT SERPL-MCNC: 8.2 G/DL (ref 6–8.4)
RBC # BLD AUTO: 3.97 M/UL (ref 4–5.4)
SATURATED IRON: 30 % (ref 20–50)
SODIUM SERPL-SCNC: 136 MMOL/L (ref 136–145)
TOTAL IRON BINDING CAPACITY: 428 UG/DL (ref 250–450)
TRANSFERRIN SERPL-MCNC: 306 MG/DL (ref 200–375)
WBC # BLD AUTO: 6.56 K/UL (ref 3.9–12.7)

## 2020-04-23 PROCEDURE — 99215 PR OFFICE/OUTPT VISIT, EST, LEVL V, 40-54 MIN: ICD-10-PCS | Mod: S$GLB,,, | Performed by: INTERNAL MEDICINE

## 2020-04-23 PROCEDURE — 36415 COLL VENOUS BLD VENIPUNCTURE: CPT

## 2020-04-23 PROCEDURE — 80053 COMPREHEN METABOLIC PANEL: CPT

## 2020-04-23 PROCEDURE — 85025 COMPLETE CBC W/AUTO DIFF WBC: CPT

## 2020-04-23 PROCEDURE — 99215 OFFICE O/P EST HI 40 MIN: CPT | Mod: S$GLB,,, | Performed by: INTERNAL MEDICINE

## 2020-04-23 PROCEDURE — 83540 ASSAY OF IRON: CPT

## 2020-04-23 RX ORDER — DEXTROSE MONOHYDRATE 50 MG/ML
INJECTION, SOLUTION INTRAVENOUS
COMMUNITY
Start: 2020-03-24

## 2020-04-23 RX ORDER — DIPHENHYDRAMINE HYDROCHLORIDE 50 MG/ML
INJECTION INTRAMUSCULAR; INTRAVENOUS
COMMUNITY
Start: 2020-03-18

## 2020-04-23 NOTE — PATIENT INSTRUCTIONS
Keep up the good work  CBC iron/TIBC CMP today  You should see cardiologist again (Dr. Nate Kaiser) and have an echocardiogram  Ideally, you should have a repeat CT scan of the chest in the next month or two.    Stop iron and gingko biloba    For primary care:   Will need repeat abdominal US in 4 months  Referral to GI for treatment of hepatitis C

## 2020-04-23 NOTE — PROGRESS NOTES
"Subjective:       Patient ID: Soraya Gee is a 34 y.o. female.    Chief Complaint:: Hospital Follow Up (endocarditis )    HPI  Seen at [facility] from 2/29-3/18 for tricuspid valve endocarditis  "34 y.o. female with past medical history of hepatitis-C, IV drug use, in present for 3 years, released in March 2019, that is when she resumed IV drugs.  She comes in complaining of shaking chills, fever, myalgia, malaise, generalized weakness, 3 days duration after injecting some "bad drugs".  Initially she thought she had cotton fever but the symptoms persisted so she came to ER.  Patient also complains of shortness of breath, she was tachycardic and tachypneic on admission.  She has noticed swelling throughout.  Her albumin is low.  Patient is a IV drug abuse and uses heroin twice a day, for a total daily dose of 1 g.  No sick contacts.   She is admitted in ICU for closer observation to make sure she does not withdrawal.  Patient is tachycardic, tachypneic, hurting all over, her history is limited by although this  03/01/2020.  Withdrawal symptoms are improved with Precedex drip; although she still feels like she is breathing fast and she is hurting all over.  She spiked fever of 101.3.  Blood cultures came positive for GPC.  No urinary complaints, no new skin or joint complaint, no cough or phlegm.  03/02/2020.  She spiked another fever of 102.3.  All cultures are turning up to be MRSA  CRP improved from 20/5 down to 13.  WBC is still elevated.  She gets confused on and off but then she interacts appropriately.  3/3:  Chart reviewed and discussed with Dr. Mccormack.  Persistently positive blood cultures with MRSA, vancomycin EDDIE 1.  Persistently hectic fever and septic appearance, improve hyperbilirubinemia, right upper quadrant tenderness, generalized aches and pains, sedated a bit on Precedex and methadone, neurologically intact.  3/4: still febrile through late last night. 3/2 cultures negative, but 3/3 cultures have GPC. " "CT chest/abd/pelvis reviewed and septic pulmonary emboli are present, but no spleen, muscle or paraspinous lesions noted.  She is currently up in the chair, off Precedex.  We discussed her diagnosis and she is willing to receive the treatment.  Does not have much appetite but she will drink the in [place].  She still hurts everywhere and does have some pleurisy.  3/5: temperature improving. Blood cultures from yesterday are negative so far. She looks much improved, clearer mentally, still very uncomfortable, somewhat anxious. Not remembering information given yesterday. Expresses desire to "get off " the drugs. Still has no appetite. Drinking a little ensure. No diarrhea. Still splinting from pleurisy. Discussed with her about the NAVA. Length of therapy.   3/6: afebrile. Blood cultures neg x 2 days. Very anxious/hyperventilating, about NAVA. D/w anesthesia. Reassured her. No nausea, vomiting. Ok for picc line.  3/7: afebrile. VSS. 1 blood culture from yesterday drawn peripherally "left wrist" has GPC. I cannot confirm that it wasn't drawn from midline but site is different. NAVA demonstrated TV vegetation with flail leaflet and TR. No change in vision, no new MSK pain, or skin lesion. Still has some pleural discomfort associated with septic emboli.   3/8: 3/6 blood culture identified as Staph aureus. She went outside but now knows she is restricted. She has showered and put on make up. She is eating well. No diarrhea. Mild LE edema which she states is not new. No antibiotic intolerance. No change in vision, new MSK pain, new skin lesion  3/9: blood cultures not drawn until this afternoon. She feels well. Eating well. Would like to walk more. LTAC eval in progress.   3/10:  In great spirits, recent blood cultures negative.  Was visited by representative from the LTAC of her choice.  Changed over to vancomycin again.  No pleuritic complaints, appetite is good, no diarrhea, no problems with PICC line today.  No change in " "vision, no new musculoskeletal pain or skin lesions.  2020 Tmax 99.4.  afebrile now.  No chills.  She has the same chest discomfort, 2/10, worse when breathing in deep, better with rest, no associated cough or phlegm.  She admits that she feels way better than a week ago.  Good appetite.  No nausea no vomiting no diarrhea.    2020.  Patient feels well.  She states she gets some temps on and off She had a temperature of 100.6°.  WBC is still low at 8.37.  03/15/2020.  Patient had no more fevers.  She continues to tolerate vancomycin well.  She has no new complaints.  She workup from yesterday looks negative so far.  Left arm midline has been discontinued.  Discussed with hospitalist.  Discussed with patient.   2020.  Standing up in the room.  She tries to keep busy her with reading make since etc.  T-max 98.7°.  No new complaints  3/17: interval reviewed. D/w CM regarding discharge planning. She is doing very well "      20: completed 2 doses of Dalvance after discharge which would have provided parenteral coverage though next week. She has been attending methadone clinic daily and doing very well. She has a makeshift apartment in an unoccupied commercial space and is content. She does exercise, eats well, but unfortunately is still smoking about a pack per week. She has had no deterioration or complications since the hospitalization.   Taking fish oil, iron, vitamin E, gingko biloba, [place]cca? For hormone regulation.   REviewed all of the diagnostic tests she had while in hospital, including images of her CT chest    Review of patient's allergies indicates:  No Known Allergies  Past Medical History:   Diagnosis Date    Endocarditis of tricuspid valve 2020    with septic pulmonary emboli    Hepatitis C     Intravenous drug abuse 2020     Past Surgical History:   Procedure Laterality Date     SECTION       Social History     Tobacco Use    Smoking status: Current Every Day " "Smoker     Packs/day: 1.00     Years: 8.00     Pack years: 8.00     Types: Cigarettes    Smokeless tobacco: Never Used   Substance Use Topics    Alcohol use: Not Currently     Social History     Occupational History    Not on file     Family History   Problem Relation Age of Onset    Hypertension Father          Review of Systems    Constitutional: No fever, chills, sweats, fatigue, weakness, weight loss    Eyes: No change in vision, loss of vision or diplopia, photophobia    ENT: No sore throat, mouth pains, or lesions    Cardiovascular: No chest pain, VALENTIN, or worsening of mild pedal edema    Respiratory: No shortness of breath, VALENTIN, cough, wheeze, sputum,     Gastrointestinal: No abdominal pain, nausea, vomiting, diarrhea,  r focal abdominal pain    Genitourinary: No dysuria, hematuria,     Musculoskeletal: No new pain, joint swelling, or injuries    Integumentary: No new rashes, skin lesions, or wounds    Neurological: No dizziness, vertigo, unusual headaches, neuropathy, loss of vision, falls    Psychiatric: No anxiety, depression. Counseling or group therapy has not yet been offered associated with methadone clinic due to COVID 19 pandemic restrictions    Endocrine: not diabetic and no recent gyn visit. She has had no menstrual periods     Lymphatic: No lymphadenopathy, blood loss, anemia, malignancy    VAD:     Objective:      Blood pressure (!) 111/59, pulse 62, temperature 98 °F (36.7 °C), height 5' 8" (1.727 m), weight 79.4 kg (175 lb), SpO2 98 %. Body mass index is 26.61 kg/m².  Physical Exam      General: Alert and attentive, cooperative and in no distress    Eyes: Pupils equal, round, reactive to light, anicteric, EOMI, no scleral or conjunctival petechiae    Neck: Supple, non-tender, no thyromegaly or masses    ENT: EAC patent, TM normal, nares patent, no oral lesions has dentures,  no thrush    Cardiovascular: Regular rate and rhythm, 1/6 systolic murmur    Respiratory: Lungs clear without wheezes, " rales, rubs or rhonci    Gastrointestinal:  Soft, bowel sounds normal,  no mass or organomegaly, no tenderness or distention    Genitourinary:   no flank tenderness    Integumentary: Skin without rashes, lesions, or wounds   Vascular: No palpable peripheral edema or phlebitis, warm and well perfused. Numerous scarred veins on forearms    Musculoskeletal: Ambulates without difficulty, no acute arthritis, synovitis or myositis. Normal muscle bulk and strength    Lymphatic: No cervical, axillary, or inguinal LAD    Neurological: Normal LOC, cranial nerves, speech, reflexes, gait    Psychiatric: Normal mood, speech, very pleasant demeanor, shows good insight     Wound:    VAD:         Recent Diagnostics:     abd US:    1.  Sludge seen in the gallbladder, without findings to specifically suggest acute cholecystitis (noting borderline gallbladder wall thickening).  2.  Rounded echogenic focus in the right lobe of the liver, with imaging features of an hepatic hemangioma, consider ultrasound follow-up in 6 months to document stability.  3.  Numerous small periportal lymph nodes, likely reactive/inflammatory in nature.  Consider correlation with liver function tests and hepatitis serologies.    CT chest/abd/pelvis 3/3  FINDINGS:  CT THORAX:  There are scattered nodular opacities throughout the lungs.  The largest in the left upper lobe  measuring 18 by 24 mm and in the right upper lobe measuring 18 x 17 mm.  There is no cavitation.  There are bibasilar alveolar opacities suggestive of atelectasis versus infiltrates.  There are tiny pleural effusions.  The trachea and bronchi are normal.  The heart is mildly enlarged.  There is no hilar, mediastinal or axillary adenopathy.    CT ABDOMEN:  The liver and spleen are mildly enlarged.  There is no hepatic or splenic mass.  The pancreas, gallbladder and adrenal glands are normal.  The kidneys enhance symmetrically without hydronephrosis or calculi.  There are no thick-walled or  dilated bowel loops.  There is no adenopathy.    CT PELVIS:  The uterus and ovaries are normal.  There is a Vasquez catheter within the bladder.  There is no pelvic adenopathy.  There are no lytic or blastic lesions.      Impression       Bilateral diffuse nodular predominantly pleural based opacities without cavitation.  Findings are most consistent with septic emboli.  Multifocal pneumonia could not be excluded.  There is bibasilar airspace disease which may represent atelectasis or infiltrates with small bilateral pleural effusions    Mild hepatosplenomegaly.  The hyperechoic mass in the right lobe of the liver is not seen on CT and most likely represents hemangioma  No acute abnormality within the abdomen or pelvis     TRANSESOPHAGEAL ECHO 3/6  Findings:  1. Aortic valve is structurally normal with good leaflet excursion. No significant regurgitation.   2. Mitral valve is structurally normal with good leaflet excursion. Trace regurgitation.   3. Tricuspid valve: There appears to be a small vegetation (0.9 X 0.4 cm) attached to the base of the septal leaflet. There also appears to be prolapse and flail of the leaflets causing severe eccentric tricuspid regurgitation.   4. Pulmonary valve is structurally normal with good leaflet excursion. No significant regurgitation.  5. Overall LVEF and RVEF appears normal.   6. No obvious shunt across the interatrial septum by color flow Doppler and agitated saline    4/23/2020  Iron 30 - 160 ug/dL 128    Transferrin 200 - 375 mg/dL 306    TIBC 250 - 450 ug/dL 428    Saturated Iron 20 - 50 % 30       Ref Range & Units 10:58  (4/23/20) 1mo ago  (3/18/20) 1mo ago  (3/16/20)   Sodium 136 - 145 mmol/L 136  139  137    Potassium 3.5 - 5.1 mmol/L 4.1  4.1  4.2    Chloride 95 - 110 mmol/L 105  105  103    CO2 23 - 29 mmol/L 25  30High   27    Glucose 70 - 110 mg/dL 79  84  93    BUN, Bld 6 - 20 mg/dL 13  19  18    Creatinine 0.5 - 1.4 mg/dL 0.5  0.6  0.7    Calcium 8.7 - 10.5 mg/dL 9.3   9.1  9.0    Total Protein 6.0 - 8.4 g/dL 8.2  7.4  7.7    Albumin 3.5 - 5.2 g/dL 3.8  2.7Low   2.8Low     Total Bilirubin 0.1 - 1.0 mg/dL 0.8  1.2High  CM 1.3High  CM      Alkaline Phosphatase 55 - 135 U/L 100  84  94    AST 10 - 40 U/L 83High   65High   44High     ALT 10 - 44 U/L 150High   92High   63High     Anion Gap 8 - 16 mmol/L 6Low   4Low   7Low        Ref Range & Units 10:58  (4/23/20) 1mo ago  (3/18/20) 1mo ago  (3/16/20)   WBC 3.90 - 12.70 K/uL 6.56  6.54  7.20    RBC 4.00 - 5.40 M/uL 3.97Low   2.51Low   2.43Low     Hemoglobin 12.0 - 16.0 g/dL 12.5  7.9Low   7.7Low     Hematocrit 37.0 - 48.5 % 38.9  24.5Low   23.9Low     Mean Corpuscular Volume 82 - 98 fL 98  98  98    Mean Corpuscular Hemoglobin 27.0 - 31.0 pg 31.5High   31.5High   31.7High     Mean Corpuscular Hemoglobin Conc 32.0 - 36.0 g/dL 32.1  32.2  32.2    RDW 11.5 - 14.5 % 13.4  15.1High   15.2High     Platelets 150 - 350 K/uL 202  245  275    MPV 9.2 - 12.9 fL 10.9  9.5  9.2    Immature Granulocytes 0.0 - 0.5 % 0.2  0.3  0.3    Gran # (ANC) 1.8 - 7.7 K/uL 2.7  2.9  3.5    Immature Grans (Abs) 0.00 - 0.04 K/uL 0.01  0.02 CM 0.02 CM       Lymph # 1.0 - 4.8 K/uL 3.2  2.7  2.6    Mono # 0.3 - 1.0 K/uL 0.4  0.7  0.9    Eos # 0.0 - 0.5 K/uL 0.2  0.1  0.2    Baso # 0.00 - 0.20 K/uL 0.04  0.08  0.09    nRBC 0 /100 WBC 0  0  0    Gran% 38.0 - 73.0 % 40.7  44.9  48.7    Lymph% 18.0 - 48.0 % 48.5High   41.7  35.6    Mono% 4.0 - 15.0 % 6.3  10.2  11.9    Eosinophil% 0.0 - 8.0 % 3.7  1.7  2.2    Basophil% 0.0 - 1.9 % 0.6  1.2  1.3             Assessment and Plan:           Endocarditis of tricuspid valve  -     Echo Color Flow Doppler? Yes; Future; Expected date: 05/23/2020  -     Ambulatory referral/consult to Cardiology; Future; Expected date: 05/23/2020    Sepsis due to methicillin resistant Staphylococcus aureus (MRSA) with encephalopathy, unspecified whether septic shock present    Nonrheumatic tricuspid valve regurgitation    Septic pulmonary embolism  without acute cor pulmonale, unspecified chronicity  -     CT Chest Without Contrast; Future; Expected date: 05/23/2020    Anemia of chronic disease  -     CBC auto differential; Future; Expected date: 04/23/2020  -     Iron and TIBC; Future; Expected date: 04/23/2020    Chronic hepatitis C without hepatic coma  -     Comprehensive metabolic panel; Future; Expected date: 04/23/2020  -     Iron and TIBC; Future; Expected date: 04/23/2020    Opioid dependence in remission    Smoker      Keep up the good work  CBC iron/TIBC CMP today  You should see cardiologist again (Dr. Nate Kaiser) and have an echocardiogram  Ideally, you should have a repeat CT scan of the chest in the next month or two.    Stop iron and gingko biloba    For primary care:   Will need repeat abdominal US in 4 months  Referral to GI for treatment of hepatitis C      This note was created using Dragon voice recognition software that occasionally misinterpreted phrases or words.

## 2020-06-04 ENCOUNTER — CLINICAL SUPPORT (OUTPATIENT)
Dept: CARDIOLOGY | Facility: HOSPITAL | Age: 35
End: 2020-06-04
Attending: INTERNAL MEDICINE
Payer: MEDICAID

## 2020-06-04 ENCOUNTER — HOSPITAL ENCOUNTER (OUTPATIENT)
Dept: RADIOLOGY | Facility: HOSPITAL | Age: 35
Discharge: HOME | End: 2020-06-04
Attending: INTERNAL MEDICINE
Payer: MEDICAID

## 2020-06-04 DIAGNOSIS — I26.90 SEPTIC PULMONARY EMBOLISM WITHOUT ACUTE COR PULMONALE, UNSPECIFIED CHRONICITY: ICD-10-CM

## 2020-06-04 DIAGNOSIS — I07.9 ENDOCARDITIS OF TRICUSPID VALVE: ICD-10-CM

## 2020-06-04 PROCEDURE — 71250 CT THORAX DX C-: CPT | Mod: TC

## 2020-06-04 PROCEDURE — 93306 TTE W/DOPPLER COMPLETE: CPT

## 2020-06-05 LAB
AORTIC ROOT ANNULUS: 2.08 CM
AORTIC VALVE CUSP SEPERATION: 1.89 CM
AV INDEX (PROSTH): 0.72
AV MEAN GRADIENT: 4 MMHG
AV PEAK GRADIENT: 8 MMHG
AV VALVE AREA: 2.27 CM2
AV VELOCITY RATIO: 83.09
CV ECHO LV RWT: 0.3 CM
DOP CALC AO PEAK VEL: 1.4 M/S
DOP CALC AO VTI: 27.62 CM
DOP CALC LVOT AREA: 3.1 CM2
DOP CALC LVOT DIAMETER: 2 CM
DOP CALC LVOT PEAK VEL: 116.32 M/S
DOP CALC LVOT STROKE VOLUME: 62.58 CM3
DOP CALCLVOT PEAK VEL VTI: 19.93 CM
E WAVE DECELERATION TIME: 208.51 MSEC
E/A RATIO: 1.86
E/E' RATIO: 3.48 M/S
ECHO LV POSTERIOR WALL: 0.75 CM (ref 0.6–1.1)
FRACTIONAL SHORTENING: 35 % (ref 28–44)
INTERVENTRICULAR SEPTUM: 0.73 CM (ref 0.6–1.1)
LEFT ATRIUM SIZE: 2.99 CM
LEFT INTERNAL DIMENSION IN SYSTOLE: 3.25 CM (ref 2.1–4)
LEFT VENTRICULAR INTERNAL DIMENSION IN DIASTOLE: 5.02 CM (ref 3.5–6)
LEFT VENTRICULAR MASS: 123.84 G
LV LATERAL E/E' RATIO: 3.33 M/S
LV SEPTAL E/E' RATIO: 3.64 M/S
MV PEAK A VEL: 0.43 M/S
MV PEAK E VEL: 0.8 M/S
PISA TR MAX VEL: 2.73 M/S
RA PRESSURE: 8 MMHG
RIGHT VENTRICULAR END-DIASTOLIC DIMENSION: 277 CM
TDI LATERAL: 0.24 M/S
TDI SEPTAL: 0.22 M/S
TDI: 0.23 M/S
TR MAX PG: 30 MMHG
TV REST PULMONARY ARTERY PRESSURE: 38 MMHG

## 2020-08-18 ENCOUNTER — TELEPHONE (OUTPATIENT)
Dept: INFECTIOUS DISEASES | Facility: CLINIC | Age: 35
End: 2020-08-18

## 2020-08-18 NOTE — TELEPHONE ENCOUNTER
Wanted you to know that she was going to [facility] ED because her leg is swelling up and hurting her really bad. Also said that she is sorry she has not had an opportunity to follow up with you nor Dr. Guzman. Wanted you to know and wanted to speak to you.    VLAD Corea

## 2020-10-30 ENCOUNTER — TELEPHONE (OUTPATIENT)
Dept: CARDIOLOGY | Facility: CLINIC | Age: 35
End: 2020-10-30

## 2020-10-30 NOTE — TELEPHONE ENCOUNTER
Patient was told to try not to miss appt but pt had court. Patient was told we could put on cancel list if someone cancel they can call. Patient understood.

## 2020-10-30 NOTE — TELEPHONE ENCOUNTER
----- Message from Brandee Ortega MA sent at 10/30/2020  9:45 AM CDT -----  PT is requesting to schedule a appt or R/S  Call back # [phone]

## 2021-04-10 ENCOUNTER — OFFICE VISIT (OUTPATIENT)
Dept: URGENT CARE | Facility: CLINIC | Age: 36
End: 2021-04-10
Payer: MEDICAID

## 2021-04-10 VITALS
SYSTOLIC BLOOD PRESSURE: 135 MMHG | HEART RATE: 89 BPM | BODY MASS INDEX: 37.98 KG/M2 | RESPIRATION RATE: 16 BRPM | WEIGHT: 249.81 LBS | DIASTOLIC BLOOD PRESSURE: 90 MMHG | OXYGEN SATURATION: 97 % | TEMPERATURE: 98 F

## 2021-04-10 DIAGNOSIS — R30.0 DYSURIA: ICD-10-CM

## 2021-04-10 DIAGNOSIS — R10.2 PELVIC PAIN: ICD-10-CM

## 2021-04-10 DIAGNOSIS — N89.8 VAGINAL DISCHARGE: ICD-10-CM

## 2021-04-10 DIAGNOSIS — N30.01 ACUTE CYSTITIS WITH HEMATURIA: Primary | ICD-10-CM

## 2021-04-10 DIAGNOSIS — Z20.2 POSSIBLE EXPOSURE TO STD: ICD-10-CM

## 2021-04-10 LAB
B-HCG UR QL: NEGATIVE
BILIRUB UR QL STRIP: NEGATIVE
CTP QC/QA: YES
GLUCOSE UR QL STRIP: NEGATIVE
KETONES UR QL STRIP: NEGATIVE
LEUKOCYTE ESTERASE UR QL STRIP: POSITIVE
PH, POC UA: 5.5
POC BLOOD, URINE: POSITIVE
POC NITRATES, URINE: NEGATIVE
PROT UR QL STRIP: POSITIVE
SP GR UR STRIP: 1.02 (ref 1–1.03)
UROBILINOGEN UR STRIP-ACNC: ABNORMAL (ref 0.1–1.1)

## 2021-04-10 PROCEDURE — 81025 URINE PREGNANCY TEST: CPT | Mod: S$GLB,,, | Performed by: NURSE PRACTITIONER

## 2021-04-10 PROCEDURE — 99214 OFFICE O/P EST MOD 30 MIN: CPT | Mod: 25,S$GLB,, | Performed by: NURSE PRACTITIONER

## 2021-04-10 PROCEDURE — 81003 POCT URINALYSIS, DIPSTICK, AUTOMATED, W/O SCOPE: ICD-10-PCS | Mod: QW,S$GLB,, | Performed by: NURSE PRACTITIONER

## 2021-04-10 PROCEDURE — 81025 POCT URINE PREGNANCY: ICD-10-PCS | Mod: S$GLB,,, | Performed by: NURSE PRACTITIONER

## 2021-04-10 PROCEDURE — 81003 URINALYSIS AUTO W/O SCOPE: CPT | Mod: QW,S$GLB,, | Performed by: NURSE PRACTITIONER

## 2021-04-10 PROCEDURE — 99214 PR OFFICE/OUTPT VISIT, EST, LEVL IV, 30-39 MIN: ICD-10-PCS | Mod: 25,S$GLB,, | Performed by: NURSE PRACTITIONER

## 2021-04-10 RX ORDER — DOXYCYCLINE 100 MG/1
100 CAPSULE ORAL 2 TIMES DAILY
Qty: 14 CAPSULE | Refills: 0 | Status: SHIPPED | OUTPATIENT
Start: 2021-04-10 | End: 2021-04-17

## 2021-04-10 RX ORDER — CEFTRIAXONE 500 MG/1
500 INJECTION, POWDER, FOR SOLUTION INTRAMUSCULAR; INTRAVENOUS
Status: COMPLETED | OUTPATIENT
Start: 2021-04-10 | End: 2021-04-10

## 2021-04-10 RX ADMIN — CEFTRIAXONE 500 MG: 500 INJECTION, POWDER, FOR SOLUTION INTRAMUSCULAR; INTRAVENOUS at 02:04

## 2021-04-15 DIAGNOSIS — N76.0 BACTERIAL VAGINOSIS: Primary | ICD-10-CM

## 2021-04-15 DIAGNOSIS — B96.89 BACTERIAL VAGINOSIS: Primary | ICD-10-CM

## 2021-04-15 LAB
A VAGINAE DNA VAG QL NAA+PROBE: ABNORMAL SCORE
BVAB2 DNA VAG QL NAA+PROBE: ABNORMAL SCORE
C ALBICANS DNA VAG QL NAA+PROBE: NEGATIVE
C GLABRATA DNA VAG QL NAA+PROBE: NEGATIVE
C TRACH DNA VAG QL NAA+PROBE: NEGATIVE
MEGA1 DNA VAG QL NAA+PROBE: ABNORMAL SCORE
N GONORRHOEA DNA VAG QL NAA+PROBE: POSITIVE
T VAGINALIS DNA VAG QL NAA+PROBE: NEGATIVE

## 2021-04-15 RX ORDER — METRONIDAZOLE 500 MG/1
500 TABLET ORAL EVERY 12 HOURS
Qty: 14 TABLET | Refills: 0 | Status: SHIPPED | OUTPATIENT
Start: 2021-04-15 | End: 2021-04-22

## 2021-04-16 LAB
BACTERIA UR CULT: ABNORMAL
BACTERIA UR CULT: ABNORMAL
OTHER ANTIBIOTIC SUSC ISLT: ABNORMAL

## 2021-04-23 ENCOUNTER — TELEPHONE (OUTPATIENT)
Dept: URGENT CARE | Facility: CLINIC | Age: 36
End: 2021-04-23

## 2021-08-14 ENCOUNTER — HOSPITAL ENCOUNTER (EMERGENCY)
Facility: HOSPITAL | Age: 36
Discharge: HOME | End: 2021-08-14
Attending: EMERGENCY MEDICINE
Payer: MEDICAID

## 2021-08-14 VITALS
BODY MASS INDEX: 30.31 KG/M2 | TEMPERATURE: 98 F | WEIGHT: 200 LBS | RESPIRATION RATE: 18 BRPM | OXYGEN SATURATION: 98 % | SYSTOLIC BLOOD PRESSURE: 117 MMHG | DIASTOLIC BLOOD PRESSURE: 64 MMHG | HEIGHT: 68 IN | HEART RATE: 97 BPM

## 2021-08-14 DIAGNOSIS — U07.1 COVID-19: ICD-10-CM
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
## 2021-08-14 LAB — SARS-COV-2 RDRP RESP QL NAA+PROBE: POSITIVE

## 2021-08-14 PROCEDURE — U0002 COVID-19 LAB TEST NON-CDC: HCPCS | Performed by: PHYSICIAN ASSISTANT

## 2021-08-14 PROCEDURE — 99284 EMERGENCY DEPT VISIT MOD MDM: CPT

## 2021-08-14 RX ORDER — ALBUTEROL SULFATE 90 UG/1
1-2 AEROSOL, METERED RESPIRATORY (INHALATION) EVERY 6 HOURS PRN
Qty: 6.7 G | Refills: 0 | Status: SHIPPED | OUTPATIENT
Start: 2021-08-14 | End: 2022-08-14

## 2021-08-14 RX ORDER — BENZONATATE 100 MG/1
100 CAPSULE ORAL 3 TIMES DAILY PRN
Qty: 20 CAPSULE | Refills: 0 | Status: SHIPPED | OUTPATIENT
Start: 2021-08-14

## 2021-08-14 RX ORDER — FLUTICASONE PROPIONATE 50 MCG
1 SPRAY, SUSPENSION (ML) NASAL 2 TIMES DAILY PRN
Qty: 15 G | Refills: 0 | Status: SHIPPED | OUTPATIENT
Start: 2021-08-14

## 2021-08-16 ENCOUNTER — TELEPHONE (OUTPATIENT)
Dept: ADMINISTRATIVE | Facility: CLINIC | Age: 36
End: 2021-08-16

## 2021-08-17 ENCOUNTER — TELEPHONE (OUTPATIENT)
Dept: ADMINISTRATIVE | Facility: CLINIC | Age: 36
End: 2021-08-17

## 2021-09-09 ENCOUNTER — HOSPITAL ENCOUNTER (EMERGENCY)
Facility: HOSPITAL | Age: 36
Discharge: HOME | End: 2021-09-09
Attending: EMERGENCY MEDICINE
Payer: MEDICAID

## 2021-09-09 VITALS
RESPIRATION RATE: 14 BRPM | OXYGEN SATURATION: 98 % | WEIGHT: 200 LBS | BODY MASS INDEX: 30.31 KG/M2 | DIASTOLIC BLOOD PRESSURE: 61 MMHG | HEIGHT: 68 IN | SYSTOLIC BLOOD PRESSURE: 108 MMHG | HEART RATE: 87 BPM | TEMPERATURE: 99 F

## 2021-09-09 DIAGNOSIS — R68.83 CHILLS: ICD-10-CM

## 2021-09-09 LAB
ALBUMIN SERPL BCP-MCNC: 3.9 G/DL (ref 3.5–5.2)
ALP SERPL-CCNC: 74 U/L (ref 55–135)
ALT SERPL W/O P-5'-P-CCNC: 11 U/L (ref 10–44)
ANION GAP SERPL CALC-SCNC: 10 MMOL/L (ref 8–16)
AST SERPL-CCNC: 21 U/L (ref 10–40)
B-HCG UR QL: NEGATIVE
BACTERIA #/AREA URNS HPF: ABNORMAL /HPF
BASOPHILS # BLD AUTO: 0.04 K/UL (ref 0–0.2)
BASOPHILS NFR BLD: 0.6 % (ref 0–1.9)
BILIRUB SERPL-MCNC: 0.8 MG/DL (ref 0.1–1)
BILIRUB UR QL STRIP: NEGATIVE
BUN SERPL-MCNC: 8 MG/DL (ref 6–20)
CALCIUM SERPL-MCNC: 9.5 MG/DL (ref 8.7–10.5)
CHLORIDE SERPL-SCNC: 105 MMOL/L (ref 95–110)
CLARITY UR: CLEAR
CO2 SERPL-SCNC: 24 MMOL/L (ref 23–29)
COLOR UR: YELLOW
CREAT SERPL-MCNC: 0.7 MG/DL (ref 0.5–1.4)
CTP QC/QA: YES
DIFFERENTIAL METHOD: ABNORMAL
EOSINOPHIL # BLD AUTO: 0 K/UL (ref 0–0.5)
EOSINOPHIL NFR BLD: 0.6 % (ref 0–8)
ERYTHROCYTE [DISTWIDTH] IN BLOOD BY AUTOMATED COUNT: 12.7 % (ref 11.5–14.5)
EST. GFR  (AFRICAN AMERICAN): >60 ML/MIN/1.73 M^2
EST. GFR  (NON AFRICAN AMERICAN): >60 ML/MIN/1.73 M^2
GLUCOSE SERPL-MCNC: 85 MG/DL (ref 70–110)
GLUCOSE UR QL STRIP: NEGATIVE
HCT VFR BLD AUTO: 38.4 % (ref 37–48.5)
HGB BLD-MCNC: 12.5 G/DL (ref 12–16)
HGB UR QL STRIP: NEGATIVE
HYALINE CASTS #/AREA URNS LPF: 4 /LPF
IMM GRANULOCYTES # BLD AUTO: 0.03 K/UL (ref 0–0.04)
IMM GRANULOCYTES NFR BLD AUTO: 0.4 % (ref 0–0.5)
KETONES UR QL STRIP: NEGATIVE
LEUKOCYTE ESTERASE UR QL STRIP: ABNORMAL
LYMPHOCYTES # BLD AUTO: 1.2 K/UL (ref 1–4.8)
LYMPHOCYTES NFR BLD: 18 % (ref 18–48)
MCH RBC QN AUTO: 30.3 PG (ref 27–31)
MCHC RBC AUTO-ENTMCNC: 32.6 G/DL (ref 32–36)
MCV RBC AUTO: 93 FL (ref 82–98)
MICROSCOPIC COMMENT: ABNORMAL
MONOCYTES # BLD AUTO: 0.4 K/UL (ref 0.3–1)
MONOCYTES NFR BLD: 6 % (ref 4–15)
NEUTROPHILS # BLD AUTO: 5 K/UL (ref 1.8–7.7)
NEUTROPHILS NFR BLD: 74.4 % (ref 38–73)
NITRITE UR QL STRIP: NEGATIVE
NRBC BLD-RTO: 0 /100 WBC
PH UR STRIP: 8 [PH] (ref 5–8)
PLATELET # BLD AUTO: 261 K/UL (ref 150–450)
PMV BLD AUTO: 10.3 FL (ref 9.2–12.9)
POTASSIUM SERPL-SCNC: 4.3 MMOL/L (ref 3.5–5.1)
PROCALCITONIN SERPL IA-MCNC: <0.05 NG/ML (ref 0–0.5)
PROT SERPL-MCNC: 8 G/DL (ref 6–8.4)
PROT UR QL STRIP: NEGATIVE
RBC # BLD AUTO: 4.12 M/UL (ref 4–5.4)
RBC #/AREA URNS HPF: 5 /HPF (ref 0–4)
SODIUM SERPL-SCNC: 139 MMOL/L (ref 136–145)
SP GR UR STRIP: 1.01 (ref 1–1.03)
SQUAMOUS #/AREA URNS HPF: 4 /HPF
URN SPEC COLLECT METH UR: ABNORMAL
UROBILINOGEN UR STRIP-ACNC: NEGATIVE EU/DL
WBC # BLD AUTO: 6.67 K/UL (ref 3.9–12.7)
WBC #/AREA URNS HPF: 12 /HPF (ref 0–5)

## 2021-09-09 PROCEDURE — 99284 EMERGENCY DEPT VISIT MOD MDM: CPT | Mod: 25

## 2021-09-09 PROCEDURE — 87086 URINE CULTURE/COLONY COUNT: CPT | Performed by: EMERGENCY MEDICINE

## 2021-09-09 PROCEDURE — 87040 BLOOD CULTURE FOR BACTERIA: CPT | Performed by: EMERGENCY MEDICINE

## 2021-09-09 PROCEDURE — 87186 SC STD MICRODIL/AGAR DIL: CPT | Performed by: EMERGENCY MEDICINE

## 2021-09-09 PROCEDURE — 81001 URINALYSIS AUTO W/SCOPE: CPT | Performed by: EMERGENCY MEDICINE

## 2021-09-09 PROCEDURE — 81025 URINE PREGNANCY TEST: CPT | Performed by: EMERGENCY MEDICINE

## 2021-09-09 PROCEDURE — 87077 CULTURE AEROBIC IDENTIFY: CPT | Performed by: EMERGENCY MEDICINE

## 2021-09-09 PROCEDURE — 85025 COMPLETE CBC W/AUTO DIFF WBC: CPT | Performed by: EMERGENCY MEDICINE

## 2021-09-09 PROCEDURE — 80053 COMPREHEN METABOLIC PANEL: CPT | Performed by: EMERGENCY MEDICINE

## 2021-09-09 PROCEDURE — 84145 PROCALCITONIN (PCT): CPT | Performed by: EMERGENCY MEDICINE

## 2021-09-09 PROCEDURE — 36415 COLL VENOUS BLD VENIPUNCTURE: CPT | Performed by: EMERGENCY MEDICINE

## 2021-09-11 LAB — BACTERIA UR CULT: ABNORMAL

## 2021-09-12 LAB
BACTERIA BLD CULT: ABNORMAL

## 2021-09-14 LAB
BACTERIA BLD CULT: NORMAL
BACTERIA BLD CULT: NORMAL

## 2022-12-20 NOTE — PROGRESS NOTES
"Progress Note  Infectious Disease    Reason for Consult:  Probable endocarditis    02/29/2020.  Consult note. Soraya Gee is a  appearing 34 y.o. female with past medical history of hepatitis-C, IV drug use, in present for 3 years, released in March 2019, that is when she resumed IV drugs.  She comes in complaining of shaking chills, fever, myalgia, malaise, generalized weakness, 3 days duration after injecting some "bad drugs".  Initially she thought she had cotton fever but the symptoms persisted so she came to ER.  Patient also complains of shortness of breath, she was tachycardic and tachypneic on admission.  She has noticed swelling throughout.  Her albumin is low  Patient is a IV drug abuse and uses heroin twice a day, for a total daily dose of 1 g.  No sick contacts.  She is admitted in ICU for closer observation to make sure she does not withdrawal.  Patient is tachycardic, tachypneic, hurting all over, her history is limited by although this    03/01/2020.  Withdrawal symptoms are improved with Precedex drip; although she still feels like she is breathing fast and she is hurting all over.  She spiked fever of 101.3.  Blood cultures came positive for GPC.  No urinary complaints, no new skin or joint complaint, no cough or phlegm.  03/02/2020.  She spiked another fever of 102.3.  All cultures are turning up to be MRSA  CRP improved from 20/5 down to 13.  WBC is still elevated.  She gets confused on and off but then she interacts appropriately.    3/3:  Chart reviewed and discussed with Dr. Mccormack.  Persistently positive blood cultures with MRSA, vancomycin EDDIE 1.  Persistently hectic fever and septic appearance, improve hyperbilirubinemia, right upper quadrant tenderness, generalized aches and pains, sedated a bit on Precedex and methadone, neurologically intact.    Antibiotics (From admission, onward)    Start     Stop Route Frequency Ordered    03/02/20 1730  vancomycin (VANCOCIN) 1,500 mg in dextrose 5 " Procedure in Detail:  Informed consent was obtained for the procedure. The patient was placed in the left lateral decubitus position and sedation was induced by anesthesia. The [id number] was inserted into the rectum and advanced under direct vision to the cecum, which was identified by the appendiceal orifice. The quality of the colonic preparation was excellent. A careful inspection was made as the colonoscope was withdrawn, including a retroflexed view of the rectum; findings and interventions are described below. Appropriate photodocumentation was obtained. Findings:   ANUS: Anal exam reveals no masses or hemorrhoids, sphincter tone is normal.   RECTUM: Rectal exam reveals no masses or hemorrhoids. SIGMOID COLON: The mucosa is normal with good vascular pattern and without ulcers, diverticula, and polyps. DESCENDING COLON: The mucosa is normal with good vascular pattern and without ulcers, diverticula, and polyps. SPLENIC FLEXURE: The splenic flexure is normal.   TRANSVERSE COLON: The mucosa is normal with good vascular pattern and without ulcers, diverticula, and polyps. HEPATIC FLEXURE: The hepatic flexure is normal.   ASCENDING COLON: The mucosa is normal with good vascular pattern and without ulcers, diverticula, and polyps. CECUM: The appendiceal orifice appears normal. The ileocecal valve appears normal.   TERMINAL ILEUM: The terminal ileum was not entered. Specimens: No specimens were collected. Complications: None; patient tolerated the procedure well. \    EBL - none    Recommendations:   - For colon cancer screening in this average-risk patient, colonoscopy may be repeated in 10 years.      Signed By: Delena Alpers, MD                        January 16, 2019 % 500 mL IVPB  (vancomycin IVPB)      -- IV Every 24 hours (non-standard times) 03/02/20 0839    03/01/20 1230  mupirocin 2 % ointment  (MRSA Decolonization Orders [facility])      03/06 0859 Nasl 2 times daily 03/01/20 1126    02/29/20 1829  vancomycin - pharmacy to dose  (vancomycin IVPB)      -- IV pharmacy to manage frequency 02/29/20 1729        Antifungals (From admission, onward)    None        Antivirals (From admission, onward)    None            EXAM & DIAGNOSTICS REVIEWED:   Vitals:     Temp:  [99.5 °F (37.5 °C)-103 °F (39.4 °C)]   Temp: 100.1 °F (37.8 °C) (03/03/20 0800)  Pulse: 95 (03/03/20 0815)  Resp: (!) 40 (03/03/20 0815)  BP: (!) 110/55 (03/03/20 0800)  SpO2: (!) 94 % (03/03/20 0815)    Intake/Output Summary (Last 24 hours) at 3/3/2020 0835  Last data filed at 3/3/2020 0818  Gross per 24 hour   Intake 1676.83 ml   Output 3600 ml   Net -1923.17 ml     Vitals:    03/03/20 0815   BP:    Pulse: 95   Resp: (!) 40   Temp:          General:  Arousable, cooperative, tachypneic, uncomfortable, diaphoretic  Eyes:  icteric, PERRL, EOMI, no scleral conjunctival lesions  ENT:  No ulcers, exudates, thrush, nares patent, edentulous.     Neck:  supple, no masses or adenopathy appreciated  Lungs: Tachypneic,faint crackles, no foci of consolidation but her effort is poor   Heart:  Regular, tachycardic common soft 1/6 systolic murmur  Abd:  Soft, ND, normal BS, tenderness in the right upper quadrant, over the liver.  :  Vasquez catheter, with dark orange urine    Musc:  No focal joint swelling, synovitis, myositis, moves all extremities  Skin:    Marks from prior IV use.  Palms and soles are erythematous, without septic emboli, subungual petechia   Wound:   Neuro:  speech fluent, but slow due to sedation, face symmetric, moves all extremities, no focal weakness.   Psych:  Sedated but mildly anxious,   Lymphatic:     No cervical, supraclavicular, axillary, or inguinal nodes  Extrem:  No palpable edema but hands and feet  appear edematous  No  , phlebitis, cellulitis, warm and well perfused  VAD:   Peripherals   Isolation:  Contact    Lines/Tubes/Drains:  Peripheral IV    General Labs reviewed:  Recent Labs   Lab 03/01/20 0521 03/01/20 2014 03/02/20 0442 03/03/20 0452   WBC 15.64*  --  15.82* 16.22*   HGB 10.8*  --  10.3* 11.1*   HCT 31.2* 38 29.9* 32.6*   PLT 81*  --  88* 144*       Recent Labs   Lab 03/01/20 0521 03/01/20 2030 03/02/20 0442 03/03/20 0452      < > 137 138 137   K 2.7*   < > 3.8 3.2* 3.9   CL 95   < > 101 100 103   CO2 28   < > 22* 30* 29   BUN 40*   < > 26* 25* 16   CREATININE 1.0   < > 0.7 0.6 0.4*   CALCIUM 8.2*   < > 8.7 8.5* 8.1*   PROT 6.2  --   --  5.6* 6.3   BILITOT 6.9*  --   --  7.4* 6.2*   ALKPHOS 137*  --   --  129 152*   ALT 54*  --   --  43 41   AST 58*  --   --  46* 48*    < > = values in this interval not displayed.       Recent Labs   Lab 02/29/20  1515 03/02/20 0442   CRP 24.33* 14.94*             Micro:  Microbiology Results (last 7 days)     Procedure Component Value Units Date/Time    Blood culture [[id number]] Collected:  03/02/20 0441    Order Status:  Completed Specimen:  Blood from Antecubital, Right Updated:  03/03/20 0632     Blood Culture, Routine No Growth to date      No Growth to date    Blood culture [[id number]]  (Abnormal) Collected:  02/29/20 1605    Order Status:  Completed Specimen:  Blood from Peripheral, Upper Arm, Right Updated:  03/03/20 0617     Blood Culture, Routine Gram stain aer bottle: Gram positive cocci      Gram stain porfirio bottle: Gram positive cocci      Positive results previously called      METHICILLIN RESISTANT STAPHYLOCOCCUS AUREUS  For susceptibility see order #[id number]      Blood culture x two cultures. Draw prior to antibiotics. [[id number]]  (Abnormal) Collected:  02/29/20 1517    Order Status:  Completed Specimen:  Blood from Peripheral, Forearm, Right Updated:  03/03/20 0616     Blood Culture, Routine Gram stain aer bottle: Gram positive  cocci      Gram stain porfirio bottle: Gram positive cocci      Results called to and read back by:MARILYN [email or web address];  03/01/2020        02:44 CJD      METHICILLIN RESISTANT STAPHYLOCOCCUS AUREUS  For susceptibility see order #[id number]      Narrative:       Aerobic and anaerobic    Blood culture [[id number]]  (Abnormal) Collected:  03/01/20 0521    Order Status:  Completed Specimen:  Blood Updated:  03/03/20 0616     Blood Culture, Routine Gram stain aer bottle: Gram positive cocci      Results called to and read back by:MARILYN [email or web address];        03/02/2020  02:01 CJD      METHICILLIN RESISTANT STAPHYLOCOCCUS AUREUS  For susceptibility see order #[id number]      Blood culture x two cultures. Draw prior to antibiotics. [[id number]]  (Abnormal)  (Susceptibility) Collected:  02/29/20 1542    Order Status:  Completed Specimen:  Blood from Peripheral, Forearm, Right Updated:  03/03/20 0614     Blood Culture, Routine Gram stain aer bottle: Gram positive cocci      Gram stain porfirio bottle: Gram positive cocci      Results called to and read back by:MARILYN [email or web address] ROSA;  03/01/2020        02:45 CJD      METHICILLIN RESISTANT STAPHYLOCOCCUS AUREUS  Results called to and read back by:Javi Gordon RN  03/02/2020  11:05   JBM  JBM      Narrative:       Aerobic and anaerobic    Blood culture [[id number]] Collected:  03/03/20 0502    Order Status:  Sent Specimen:  Blood Updated:  03/03/20 0508    Urine culture [[id number]] Collected:  02/29/20 1522    Order Status:  Completed Specimen:  Urine Updated:  03/02/20 0652     Urine Culture, Routine No growth    Narrative:       Preferred Collection Type->Urine, Clean Catch  Specimen Source->Urine    Blood culture [[id number]]     Order Status:  Canceled Specimen:  Blood         Imaging Reviewed:  Chest x-rays    CXR Mild central hilar bronchovascular crowding, possibly secondary to low lung volumes and atelectasis or very mild edema, or atypical infection/bronchitis as  above    US1.  Sludge seen in the gallbladder, without findings to specifically suggest acute cholecystitis (noting borderline gallbladder wall thickening).  2.  Rounded echogenic focus in the right lobe of the liver, with imaging features of an hepatic hemangioma, consider ultrasound follow-up in 6 months to document stability.  3.  Numerous small periportal lymph nodes, likely reactive/inflammatory in nature.  Consider correlation with liver function tests and hepatitis serologies.    Cardiology:    Transthoracic echo 3/1  Left Ventricle Normal ejection fraction at 60%. Normal left ventricular cavity size. Normal wall thickness observed. Normal left ventricular diastolic function. Normal left atrial pressure. No wall motion abnormalities.   Right Ventricle Normal cavity size, wall thickness and systolic function. Wall motion normal.   Left Atrium The left atrium is normal.   Right Atrium There is normal right atrial size.   Aortic Valve The aortic valve appears structurally normal. There is no stenosis. No regurgitation. There is normal leaflet mobility.The LVOT diameter is 2.02 cm.   Mitral Valve The mitral valve appears structurally normal. There is normal leaflet mobility. No regurgitation.   Tricuspid Valve The tricuspid valve appears structurally normal. No stenosis. Trace regurgitation. There is normal leaflet mobility. The estimated PA systolic pressure is 26 mmHg.   Pulmonic Valve The pulmonic valve was not well visualized. No stenosis. No regurgitation.   IVC/SVC Normal central venous pressure (3 mm Hg).   Ascending Aorta The aortic root and ascending aorta are normal in size.   Pericardium No pericardial effusion.       IMPRESSION & PLAN     Persistent MRSA bacteremia and fever.  Presumptive endocarditis in a patient with history of IV drug use.    Elevated markers of inflammation including procal, CRP, ESR.  TELLY.  Improved.   Epigastric right upper quadrant discomfort and generalized abdominal  discomfort.  Jaundice Transaminitis, heavy opiate use, H/o Hep C, untreated, ammonia normal  IVDU. heroin  Anemia   Protein malnutrition.  Hypoalbuminemia =  2.6    This patient is high risk for life-threatening deterioration and death secondary to above comorbidities and need for IV treatment.      Recommendations:  Changed to daptomycin 8 mg per kg plus ceftaroline (shown to clear bacteremia more quickly)  Serial blood cultures until clear  CT chest, abdomen, pelvis to look for other metastatic foci of infection.  I suspect that her lungs at the very least will show multiple emboli.  This will also allow was to look at the right upper quadrant since she is tender and jaundiced  Check direct bilirubin  Will need NAVA    Prognosis is poor   [[email or web address]] : Review of system intake sheet reviewed, signed and scanned in the EMR

## 2024-08-02 ENCOUNTER — HOSPITAL ENCOUNTER (EMERGENCY)
Facility: HOSPITAL | Age: 39
Discharge: LEFT BEFORE DISCHARGE | End: 2024-08-02
Attending: STUDENT IN AN ORGANIZED HEALTH CARE EDUCATION/TRAINING PROGRAM
Payer: MEDICAID

## 2024-08-02 VITALS
HEART RATE: 94 BPM | SYSTOLIC BLOOD PRESSURE: 104 MMHG | DIASTOLIC BLOOD PRESSURE: 57 MMHG | HEIGHT: 68 IN | OXYGEN SATURATION: 96 % | WEIGHT: 200 LBS | BODY MASS INDEX: 30.31 KG/M2 | TEMPERATURE: 98 F | RESPIRATION RATE: 16 BRPM

## 2024-08-02 DIAGNOSIS — T40.601A OPIATE OVERDOSE, ACCIDENTAL OR UNINTENTIONAL, INITIAL ENCOUNTER: Primary | ICD-10-CM

## 2024-08-02 DIAGNOSIS — I95.9 HYPOTENSION, UNSPECIFIED HYPOTENSION TYPE: ICD-10-CM

## 2024-08-02 PROCEDURE — 99283 EMERGENCY DEPT VISIT LOW MDM: CPT

## 2024-08-02 RX ORDER — NALOXONE HYDROCHLORIDE 4 MG/.1ML
SPRAY NASAL
Qty: 1 EACH | Refills: 0 | Status: SHIPPED | OUTPATIENT
Start: 2024-08-02